# Patient Record
Sex: MALE | Race: WHITE | NOT HISPANIC OR LATINO | Employment: OTHER | URBAN - METROPOLITAN AREA
[De-identification: names, ages, dates, MRNs, and addresses within clinical notes are randomized per-mention and may not be internally consistent; named-entity substitution may affect disease eponyms.]

---

## 2017-05-26 DIAGNOSIS — Z00.00 ROUTINE GENERAL MEDICAL EXAMINATION AT A HEALTH CARE FACILITY: Primary | ICD-10-CM

## 2017-06-28 ENCOUNTER — CLINICAL SUPPORT (OUTPATIENT)
Dept: INFECTIOUS DISEASES | Facility: CLINIC | Age: 67
End: 2017-06-28
Payer: MEDICARE

## 2017-06-28 ENCOUNTER — HOSPITAL ENCOUNTER (OUTPATIENT)
Dept: CARDIOLOGY | Facility: CLINIC | Age: 67
Discharge: HOME | End: 2017-06-28
Payer: MEDICARE

## 2017-06-28 ENCOUNTER — OFFICE VISIT (OUTPATIENT)
Dept: PULMONOLOGY | Facility: CLINIC | Age: 67
End: 2017-06-28
Payer: MEDICARE

## 2017-06-28 ENCOUNTER — CLINICAL SUPPORT (OUTPATIENT)
Dept: INTERNAL MEDICINE | Facility: CLINIC | Age: 67
End: 2017-06-28
Payer: MEDICARE

## 2017-06-28 ENCOUNTER — HOSPITAL ENCOUNTER (OUTPATIENT)
Dept: RADIOLOGY | Facility: HOSPITAL | Age: 67
Discharge: HOME | End: 2017-06-28
Attending: INTERNAL MEDICINE
Payer: MEDICARE

## 2017-06-28 VITALS
WEIGHT: 198 LBS | SYSTOLIC BLOOD PRESSURE: 125 MMHG | BODY MASS INDEX: 26.82 KG/M2 | DIASTOLIC BLOOD PRESSURE: 71 MMHG | HEART RATE: 64 BPM | HEIGHT: 72 IN

## 2017-06-28 DIAGNOSIS — Z12.5 ENCOUNTER FOR SCREENING FOR MALIGNANT NEOPLASM OF PROSTATE: ICD-10-CM

## 2017-06-28 DIAGNOSIS — R73.01 IMPAIRED FASTING GLUCOSE: ICD-10-CM

## 2017-06-28 DIAGNOSIS — Z00.00 ROUTINE GENERAL MEDICAL EXAMINATION AT A HEALTH CARE FACILITY: ICD-10-CM

## 2017-06-28 DIAGNOSIS — R94.6 ABNORMAL RESULTS OF THYROID FUNCTION STUDIES: ICD-10-CM

## 2017-06-28 DIAGNOSIS — Z00.00 ROUTINE GENERAL MEDICAL EXAMINATION AT A HEALTH CARE FACILITY: Primary | ICD-10-CM

## 2017-06-28 DIAGNOSIS — Z00.00 ANNUAL PHYSICAL EXAM: Primary | ICD-10-CM

## 2017-06-28 DIAGNOSIS — R79.9 ABNORMAL FINDING OF BLOOD CHEMISTRY: ICD-10-CM

## 2017-06-28 LAB
ALBUMIN SERPL BCP-MCNC: 3.8 G/DL
ALP SERPL-CCNC: 59 U/L
ALT SERPL W/O P-5'-P-CCNC: 7 U/L
ANION GAP SERPL CALC-SCNC: 8 MMOL/L
AST SERPL-CCNC: 22 U/L
BILIRUB SERPL-MCNC: 1.2 MG/DL
BUN SERPL-MCNC: 13 MG/DL
CALCIUM SERPL-MCNC: 9.5 MG/DL
CHLORIDE SERPL-SCNC: 103 MMOL/L
CHOLEST/HDLC SERPL: 2.9 {RATIO}
CO2 SERPL-SCNC: 27 MMOL/L
COMPLEXED PSA SERPL-MCNC: 0.85 NG/ML
CREAT SERPL-MCNC: 0.9 MG/DL
ERYTHROCYTE [DISTWIDTH] IN BLOOD BY AUTOMATED COUNT: 13.5 %
EST. GFR  (AFRICAN AMERICAN): >60 ML/MIN/1.73 M^2
EST. GFR  (NON AFRICAN AMERICAN): >60 ML/MIN/1.73 M^2
ESTIMATED AVG GLUCOSE: 114 MG/DL
GLUCOSE SERPL-MCNC: 103 MG/DL
HBA1C MFR BLD HPLC: 5.6 %
HCT VFR BLD AUTO: 45.7 %
HDL/CHOLESTEROL RATIO: 35 %
HDLC SERPL-MCNC: 203 MG/DL
HDLC SERPL-MCNC: 71 MG/DL
HGB BLD-MCNC: 15.3 G/DL
LDLC SERPL CALC-MCNC: 118 MG/DL
MCH RBC QN AUTO: 30.4 PG
MCHC RBC AUTO-ENTMCNC: 33.5 %
MCV RBC AUTO: 91 FL
NONHDLC SERPL-MCNC: 132 MG/DL
PLATELET # BLD AUTO: 279 K/UL
PMV BLD AUTO: 10.2 FL
POTASSIUM SERPL-SCNC: 4.5 MMOL/L
PROT SERPL-MCNC: 7.2 G/DL
RBC # BLD AUTO: 5.04 M/UL
SODIUM SERPL-SCNC: 138 MMOL/L
T4 FREE SERPL-MCNC: 0.75 NG/DL
TRIGL SERPL-MCNC: 70 MG/DL
TSH SERPL DL<=0.005 MIU/L-ACNC: 8.47 UIU/ML
WBC # BLD AUTO: 6.77 K/UL

## 2017-06-28 PROCEDURE — 90736 HZV VACCINE LIVE SUBQ: CPT | Mod: S$GLB,,, | Performed by: INTERNAL MEDICINE

## 2017-06-28 PROCEDURE — 71020 XR CHEST PA AND LATERAL: CPT | Mod: 26,,, | Performed by: RADIOLOGY

## 2017-06-28 PROCEDURE — 99397 PER PM REEVAL EST PAT 65+ YR: CPT | Mod: S$GLB,,, | Performed by: INTERNAL MEDICINE

## 2017-06-28 PROCEDURE — 99999 PR PBB SHADOW E&M-EST. PATIENT-LVL III: CPT | Mod: PBBFAC,,, | Performed by: INTERNAL MEDICINE

## 2017-06-28 PROCEDURE — 93000 ELECTROCARDIOGRAM COMPLETE: CPT | Mod: S$GLB,,, | Performed by: INTERNAL MEDICINE

## 2017-06-28 PROCEDURE — 99999 PR PBB SHADOW E&M-EST. PATIENT-LVL I: CPT | Mod: PBBFAC,,,

## 2017-06-28 PROCEDURE — 90471 IMMUNIZATION ADMIN: CPT | Mod: S$GLB,,, | Performed by: INTERNAL MEDICINE

## 2017-06-28 PROCEDURE — 99499 UNLISTED E&M SERVICE: CPT | Mod: S$GLB,,, | Performed by: INTERNAL MEDICINE

## 2017-06-28 NOTE — LETTER
June 28, 2017    Octavio Constantino  [address] [place] [address]             [facility] - Pulmonary Services  [address]  [facility] [address]  Phone: [phone] Dear Mr. Constantino:    Thank you for allowing me to serve you and perform your Executive Health exam on 6/28/2017. This letter will serve as a brief summary of the physical findings and laboratory/studies performed and recommendations at this time. Today's assessment is essentially normal. Keep up the exercise and your weight under 200 and you are on sound ground.           If you have any questions or concerns, please don't hesitate to call.    Sincerely,        Raphael Carrington MD

## 2017-06-28 NOTE — PROGRESS NOTES
Subjective:       Patient ID: Octavio Constantino is a 67 y.o. male.    Chief Complaint: Annual Exam    HPI 66 yo semi retired  comes for his periodic health exam . He feels well, is exercising regularly and keeping his weight under 200 pounds. No bouts of asthma and uses his Advair discus once daily. No significant medical encounters since last visit in May, 2016.  Review of Systems   Constitutional: Negative.    HENT: Negative.    Eyes: Negative.    Respiratory: Negative.         Mild persistent asthma under good control with Advair.   Cardiovascular: Negative.    Gastrointestinal: Negative.    Genitourinary: Negative.    Musculoskeletal: Negative.    Skin: Negative.    Neurological: Negative.    Psychiatric/Behavioral: Negative.    All other systems reviewed and are negative.      Objective:      Physical Exam   Constitutional: He is oriented to person, place, and time. He appears well-developed and well-nourished.   HENT:   Head: Normocephalic and atraumatic.   Right Ear: External ear normal.   Left Ear: External ear normal.   Eyes: Conjunctivae and EOM are normal. Pupils are equal, round, and reactive to light.   Neck: Normal range of motion. Neck supple.   Cardiovascular: Normal rate, regular rhythm and normal heart sounds.    Pulmonary/Chest: Effort normal and breath sounds normal.   [place] flow 480 l/min   Abdominal: Soft. Bowel sounds are normal.   Musculoskeletal: Normal range of motion.   Neurological: He is alert and oriented to person, place, and time. He has normal reflexes.   Skin: Skin is warm and dry.   Psychiatric: He has a normal mood and affect. His behavior is normal. Judgment and thought content normal.       Assessment:       No diagnosis found.    Plan:         Labs; Essentially normal Cholesteril slightly elevated but offset by a high HDL, TSH slightly high but the thyroid hormone value is normal, no treatment needed, Chest -ray is clear and EKG is normal.  IMP: Healthy Male with  good health habits, asthma well controlled.  Will get shingles injection and arrange for screening colonoscope.

## 2017-12-26 RX ORDER — LISINOPRIL 10 MG/1
TABLET ORAL
Qty: 90 TABLET | Refills: 4 | Status: SHIPPED | OUTPATIENT
Start: 2017-12-26 | End: 2019-02-10 | Stop reason: SDUPTHER

## 2018-01-17 ENCOUNTER — PES CALL (OUTPATIENT)
Dept: ADMINISTRATIVE | Facility: CLINIC | Age: 68
End: 2018-01-17

## 2018-03-13 ENCOUNTER — PES CALL (OUTPATIENT)
Dept: ADMINISTRATIVE | Facility: CLINIC | Age: 68
End: 2018-03-13

## 2018-03-23 ENCOUNTER — OFFICE VISIT (OUTPATIENT)
Dept: FAMILY MEDICINE | Facility: CLINIC | Age: 68
End: 2018-03-23
Payer: MEDICARE

## 2018-03-23 VITALS
BODY MASS INDEX: 27.68 KG/M2 | WEIGHT: 204.38 LBS | DIASTOLIC BLOOD PRESSURE: 80 MMHG | OXYGEN SATURATION: 96 % | HEIGHT: 72 IN | HEART RATE: 96 BPM | SYSTOLIC BLOOD PRESSURE: 140 MMHG

## 2018-03-23 DIAGNOSIS — Z23 NEED FOR VACCINATION AGAINST STREPTOCOCCUS PNEUMONIAE: ICD-10-CM

## 2018-03-23 DIAGNOSIS — Z12.11 SPECIAL SCREENING FOR MALIGNANT NEOPLASMS, COLON: ICD-10-CM

## 2018-03-23 DIAGNOSIS — I10 ESSENTIAL HYPERTENSION: ICD-10-CM

## 2018-03-23 DIAGNOSIS — E78.00 PURE HYPERCHOLESTEROLEMIA: ICD-10-CM

## 2018-03-23 DIAGNOSIS — E66.3 OVERWEIGHT (BMI 25.0-29.9): ICD-10-CM

## 2018-03-23 DIAGNOSIS — Z00.00 ENCOUNTER FOR PREVENTIVE HEALTH EXAMINATION: Primary | ICD-10-CM

## 2018-03-23 DIAGNOSIS — M47.819 ARTHRITIS OF FACET JOINTS AT MULTIPLE VERTEBRAL LEVELS: ICD-10-CM

## 2018-03-23 DIAGNOSIS — Z12.5 ENCOUNTER FOR PROSTATE CANCER SCREENING: ICD-10-CM

## 2018-03-23 DIAGNOSIS — Z11.59 NEED FOR HEPATITIS C SCREENING TEST: ICD-10-CM

## 2018-03-23 PROBLEM — R79.89 ELEVATED TSH: Status: ACTIVE | Noted: 2018-03-23

## 2018-03-23 PROCEDURE — G0439 PPPS, SUBSEQ VISIT: HCPCS | Mod: S$GLB,,, | Performed by: NURSE PRACTITIONER

## 2018-03-23 PROCEDURE — 99499 UNLISTED E&M SERVICE: CPT | Mod: S$GLB,,, | Performed by: NURSE PRACTITIONER

## 2018-03-23 PROCEDURE — 99999 PR PBB SHADOW E&M-EST. PATIENT-LVL IV: CPT | Mod: PBBFAC,,, | Performed by: NURSE PRACTITIONER

## 2018-03-23 PROCEDURE — G0009 ADMIN PNEUMOCOCCAL VACCINE: HCPCS | Mod: S$GLB,,, | Performed by: FAMILY MEDICINE

## 2018-03-23 PROCEDURE — 90670 PCV13 VACCINE IM: CPT | Mod: S$GLB,,, | Performed by: FAMILY MEDICINE

## 2018-03-23 NOTE — Clinical Note
Primary Care Providers: Raphael Carrington MD, MD (General)  Your patient was seen today for a HRA visit. Gap(s) in care (HEDIS gaps) have been identified during this visit that require additional testing and possible follow up.  Orders Placed This Encounter     PNEUMOCOCCAL CONJUGATE VACCINE 13-VALENT LESS THAN 4YO & GREATER THAN 51YO IM     CBC auto differential         Standing Status: Future         Standing Expiration Date: 5/22/2019     Comprehensive metabolic panel         Standing Status: Future         Standing Expiration Date: 5/22/2019     Hepatitis C antibody         Standing Status: Future         Standing Expiration Date: 5/22/2019     Lipid panel         Standing Status: Future         Standing Expiration Date: 5/22/2019     TSH         Standing Status: Future         Standing Expiration Date: 5/22/2019     PSA, Screening         Standing Status: Future         Standing Expiration Date: 5/22/2019     Case request GI: Colon CA Screening         Order Specific Keke

## 2018-03-23 NOTE — PROGRESS NOTES
Octavio Constantino presented for a  Medicare AWV and comprehensive Health Risk Assessment today. The following components were reviewed and updated:    · Medical history  · Family History  · Social history  · Allergies and Current Medications  · Health Risk Assessment  · Health Maintenance  · Care Team     ** See Completed Assessments for Annual Wellness Visit within the encounter summary.**       The following assessments were completed:  · Living Situation  · CAGE  · Depression Screening  · Timed Get Up and Go  · Whisper Test  · Cognitive Function Screening  · Nutrition Screening  · ADL Screening  · PAQ Screening    Vitals:    03/23/18 1401   BP: (!) 140/80   BP Location: Right arm   Patient Position: Sitting   BP Method: Large (Manual)   Pulse: 96   SpO2: 96%   Weight: 92.7 kg (204 lb 5.9 oz)   Height: 6' (1.829 m)     Body mass index is 27.72 kg/m².     Physical Exam   Constitutional: He is oriented to person, place, and time. He appears well-developed and well-nourished. No distress.   HENT:   Head: Normocephalic and atraumatic.   Eyes: EOM are normal. Pupils are equal, round, and reactive to light.   Neck: Neck supple. No JVD present. No tracheal deviation present.   Cardiovascular: Normal rate, regular rhythm, normal heart sounds and intact distal pulses.    No murmur heard.  Pulmonary/Chest: Effort normal and breath sounds normal. No respiratory distress. He has no wheezes. He has no rales.   Abdominal: Soft. Bowel sounds are normal. He exhibits no distension and no mass. There is no tenderness.   Musculoskeletal: Normal range of motion. He exhibits no edema or tenderness.   Neurological: He is alert and oriented to person, place, and time. Coordination normal.   Skin: Skin is warm and dry. No erythema. No pallor.   Psychiatric: He has a normal mood and affect. His behavior is normal. Judgment and thought content normal. Cognition and memory are normal. He expresses no homicidal and no suicidal ideation.    Nursing note and vitals reviewed.        Diagnoses and health risks identified today and associated recommendations/orders:    1. Encounter for preventive health examination    2. Essential hypertension  Chronic; stable on medication.  Followed by PCP.  - CBC auto differential; Future  - Comprehensive metabolic panel; Future  - TSH; Future    3. Pure hypercholesterolemia  Chronic; stable with lifestyle modifications.  Followed by PCP.    4. 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  As seen on imaging dated 07/20/11.  Followed by PCP.    5. Overweight (BMI 25.0-29.9)  Chronic, stable. Therapeutic lifestyle changes discussed. Followed by PCP.  - Lipid panel; Future    6. Special screening for malignant neoplasms, colon  - Case request GI: Colon CA Screening    7. Encounter for prostate cancer screening  - PSA, Screening; Future    8. Need for hepatitis C screening test  - Hepatitis C antibody; Future    9. Need for vaccination against Streptococcus pneumoniae  Prevnar 13 vaccination administered today in clinic.  - PNEUMOCOCCAL CONJUGATE VACCINE 13-VALENT LESS THAN 4YO & GREATER THAN 49YO IM      Provided Octavio with a 5-10 year written screening schedule and personal prevention plan. Recommendations were developed using the USPSTF age appropriate recommendations. Education, counseling, and referrals were provided as needed. After Visit Summary printed and given to patient which includes a list of additional screenings\tests needed.    Follow-up in about 3 months (around 6/28/2018) for annual visit with PCP, HRA visit in 1 year.    Syeda Hodge NP

## 2018-03-23 NOTE — PATIENT INSTRUCTIONS
Counseling and Referral of Other Preventative  (Italic type indicates deductible and co-insurance are waived)    Patient Name: Octavio Constantino  Today's Date: 3/23/2018    Health Maintenance       Date Due Completion Date    Hepatitis C Screening 1950 ---    TETANUS VACCINE 04/25/1966 ---    Colonoscopy 04/25/1998 ---    Pneumococcal (65+) (1 of 2 - PCV13) 04/25/2013 ---    Abdominal Aortic Aneurysm Screening 04/25/2013 ---    Lipid Panel 06/28/2022 6/28/2017        Orders Placed This Encounter   Procedures    PNEUMOCOCCAL CONJUGATE VACCINE 13-VALENT LESS THAN 4YO & GREATER THAN 49YO IM    CBC auto differential    Comprehensive metabolic panel    Hepatitis C antibody    Lipid panel    TSH    PSA, Screening     The following information is provided to all patients.  This information is to help you find resources for any of the problems found today that may be affecting your health:                Living healthy guide: www.[facility].[place].gov      Understanding Diabetes: www.diabetes.org      Eating healthy: www.cdc.gov/healthyweight      CDC home safety checklist: www.cdc.gov/steadi/patient.html      Agency on Aging: www.goea.[place].[facility]      Alcoholics anonymous (AA): www.aa.org      Physical Activity: www.[place].nih.gov/yx7lmbv      Tobacco use: www.quitwithusla.org

## 2018-04-05 ENCOUNTER — TELEPHONE (OUTPATIENT)
Dept: GASTROENTEROLOGY | Facility: CLINIC | Age: 68
End: 2018-04-05

## 2018-04-05 ENCOUNTER — PATIENT MESSAGE (OUTPATIENT)
Dept: GASTROENTEROLOGY | Facility: CLINIC | Age: 68
End: 2018-04-05

## 2018-04-05 NOTE — TELEPHONE ENCOUNTER
Referral was sent from Dr. Constantino to schedule an Colonoscopy, left an voicemail for patient to call the office back in regards to scheduling procedure.

## 2018-04-24 ENCOUNTER — TELEPHONE (OUTPATIENT)
Dept: GASTROENTEROLOGY | Facility: CLINIC | Age: 68
End: 2018-04-24

## 2018-04-24 NOTE — TELEPHONE ENCOUNTER
----- Message from Ana Ziegler sent at 4/24/2018  4:03 PM CDT -----  Contact: Self [phone]  Patient Returning Your Phone Call

## 2018-04-25 ENCOUNTER — TELEPHONE (OUTPATIENT)
Dept: GASTROENTEROLOGY | Facility: CLINIC | Age: 68
End: 2018-04-25

## 2018-04-25 DIAGNOSIS — Z80.0 FAMILY HISTORY OF COLON CANCER: ICD-10-CM

## 2018-04-25 DIAGNOSIS — Z12.11 COLON CANCER SCREENING: Primary | ICD-10-CM

## 2018-04-25 RX ORDER — POLYETHYLENE GLYCOL 3350, SODIUM SULFATE ANHYDROUS, SODIUM BICARBONATE, SODIUM CHLORIDE, POTASSIUM CHLORIDE 236; 22.74; 6.74; 5.86; 2.97 G/4L; G/4L; G/4L; G/4L; G/4L
4 POWDER, FOR SOLUTION ORAL ONCE
Qty: 4000 ML | Refills: 0 | Status: SHIPPED | OUTPATIENT
Start: 2018-04-25 | End: 2018-04-25

## 2018-04-25 NOTE — TELEPHONE ENCOUNTER
Colonoscopy Referral    Referring Physician: Dr. Constantino       Date: 05/30/2018    Reason for Referral: Screening colon, Fm Hx of Colon Cancer     Family History of:   Colon polyp:  Relationship/Age of Onset:     Colon cancer: yes  Relationship/Age of Onset: mother     Patient with:   Hemoccults Done: no    Iron deficient: no    On Blood Thinner: no    Valvular heart disease/valve replacement: no    Anemia Present: no    On NSAID: no    Lung disease: no    Kidney disease: no    Hx of polyps: no     Hx of colon cancer: no     Previous colon evalations:  yes  When: 1998  Where: [facility] [place] [facility]   Pertinent symptoms:       Review of patient's allergies indicates:  No Known Allergies    Patient was scheduled for colonoscopy on 05/30/2018 with Dr. Carver at [facility]. Golytely  instructions were reviewed with patient.

## 2018-05-30 ENCOUNTER — ANESTHESIA (OUTPATIENT)
Dept: ENDOSCOPY | Facility: HOSPITAL | Age: 68
End: 2018-05-30
Payer: MEDICARE

## 2018-05-30 ENCOUNTER — HOSPITAL ENCOUNTER (OUTPATIENT)
Facility: HOSPITAL | Age: 68
Discharge: HOME | End: 2018-05-30
Attending: INTERNAL MEDICINE | Admitting: INTERNAL MEDICINE
Payer: MEDICARE

## 2018-05-30 ENCOUNTER — ANESTHESIA EVENT (OUTPATIENT)
Dept: ENDOSCOPY | Facility: HOSPITAL | Age: 68
End: 2018-05-30
Payer: MEDICARE

## 2018-05-30 DIAGNOSIS — Z12.12 SCREENING FOR COLORECTAL CANCER: ICD-10-CM

## 2018-05-30 DIAGNOSIS — Z80.0 FAMILY HISTORY OF COLON CANCER: Primary | ICD-10-CM

## 2018-05-30 DIAGNOSIS — Z12.11 SCREENING FOR COLORECTAL CANCER: ICD-10-CM

## 2018-05-30 PROCEDURE — 63600175 PHARM REV CODE 636 W HCPCS: Performed by: NURSE ANESTHETIST, CERTIFIED REGISTERED

## 2018-05-30 PROCEDURE — 25000003 PHARM REV CODE 250: Performed by: INTERNAL MEDICINE

## 2018-05-30 PROCEDURE — 88305 TISSUE EXAM BY PATHOLOGIST: CPT | Mod: 26,,, | Performed by: PATHOLOGY

## 2018-05-30 PROCEDURE — 27201089 HC SNARE, DISP (ANY): Performed by: INTERNAL MEDICINE

## 2018-05-30 PROCEDURE — 37000009 HC ANESTHESIA EA ADD 15 MINS: Performed by: INTERNAL MEDICINE

## 2018-05-30 PROCEDURE — 45385 COLONOSCOPY W/LESION REMOVAL: CPT | Mod: PT,,, | Performed by: INTERNAL MEDICINE

## 2018-05-30 PROCEDURE — 45385 COLONOSCOPY W/LESION REMOVAL: CPT | Performed by: INTERNAL MEDICINE

## 2018-05-30 PROCEDURE — 88305 TISSUE EXAM BY PATHOLOGIST: CPT | Performed by: PATHOLOGY

## 2018-05-30 PROCEDURE — 37000008 HC ANESTHESIA 1ST 15 MINUTES: Performed by: INTERNAL MEDICINE

## 2018-05-30 RX ORDER — PROPOFOL 10 MG/ML
VIAL (ML) INTRAVENOUS
Status: DISCONTINUED | OUTPATIENT
Start: 2018-05-30 | End: 2018-05-30

## 2018-05-30 RX ORDER — FLUTICASONE PROPIONATE AND SALMETEROL 100; 50 UG/1; UG/1
1 POWDER RESPIRATORY (INHALATION) 2 TIMES DAILY
COMMUNITY
End: 2019-03-26

## 2018-05-30 RX ORDER — PROPOFOL 10 MG/ML
VIAL (ML) INTRAVENOUS CONTINUOUS PRN
Status: DISCONTINUED | OUTPATIENT
Start: 2018-05-30 | End: 2018-05-30

## 2018-05-30 RX ORDER — LIDOCAINE HCL/PF 100 MG/5ML
SYRINGE (ML) INTRAVENOUS
Status: DISCONTINUED | OUTPATIENT
Start: 2018-05-30 | End: 2018-05-30

## 2018-05-30 RX ORDER — SODIUM CHLORIDE 9 MG/ML
INJECTION, SOLUTION INTRAVENOUS CONTINUOUS
Status: DISCONTINUED | OUTPATIENT
Start: 2018-05-31 | End: 2018-05-30 | Stop reason: HOSPADM

## 2018-05-30 RX ORDER — LIDOCAINE HYDROCHLORIDE 10 MG/ML
1 INJECTION, SOLUTION EPIDURAL; INFILTRATION; INTRACAUDAL; PERINEURAL ONCE
Status: DISCONTINUED | OUTPATIENT
Start: 2018-05-30 | End: 2018-05-30 | Stop reason: HOSPADM

## 2018-05-30 RX ADMIN — PROPOFOL 80 MG: 10 INJECTION, EMULSION INTRAVENOUS at 08:05

## 2018-05-30 RX ADMIN — PROPOFOL 150 MCG/KG/MIN: 10 INJECTION, EMULSION INTRAVENOUS at 08:05

## 2018-05-30 RX ADMIN — SODIUM CHLORIDE: 0.9 INJECTION, SOLUTION INTRAVENOUS at 07:05

## 2018-05-30 RX ADMIN — PROPOFOL 20 MG: 10 INJECTION, EMULSION INTRAVENOUS at 08:05

## 2018-05-30 RX ADMIN — LIDOCAINE HYDROCHLORIDE 100 MG: 20 INJECTION, SOLUTION INTRAVENOUS at 08:05

## 2018-05-30 NOTE — ANESTHESIA POSTPROCEDURE EVALUATION
Anesthesia Post Evaluation    Patient: Octavio Constantino    Procedure(s) Performed: Procedure(s) (LRB):  COLONOSCOPY (N/A)    Final Anesthesia Type: MAC  Patient location during evaluation: GI PACU  Patient participation: Yes- Able to Participate  Level of consciousness: awake and alert and oriented  Post-procedure vital signs: reviewed and stable  Pain management: adequate  Airway patency: patent  PONV status at discharge: No PONV  Anesthetic complications: no      Cardiovascular status: blood pressure returned to baseline, hemodynamically stable and stable  Respiratory status: unassisted, spontaneous ventilation and room air  Hydration status: euvolemic  Follow-up not needed.        Visit Vitals  BP (!) 148/74   Pulse 74   Temp 36.8 °C (98.2 °F)   Resp 20   Ht 6' (1.829 m)   Wt 88.5 kg (195 lb)   SpO2 96%   BMI 26.45 kg/m²       Pain/Blaire Score: Pain Assessment Performed: Yes (5/30/2018  7:39 AM)  Presence of Pain: denies (5/30/2018  7:39 AM)

## 2018-05-30 NOTE — ANESTHESIA PREPROCEDURE EVALUATION
05/30/2018  Octavio Constantino is a 68 y.o., male.    Anesthesia Evaluation         Review of Systems  Social:  Non-Smoker, Alcohol Use   Cardiovascular:   Hypertension    Pulmonary:   Asthma mild    Neurological:  Neurology Normal    Endocrine:  Endocrine Normal        Physical Exam  General:  Well nourished    Airway/Jaw/Neck:  Airway Findings: Mouth Opening: Normal Tongue: Normal  General Airway Assessment: Adult  Mallampati: II      Dental:  Dental Findings: In tact   Chest/Lungs:  Chest/Lungs Findings: Clear to auscultation, Normal Respiratory Rate     Heart/Vascular:  Heart Findings: Rate: Normal  Rhythm: Regular Rhythm        Mental Status:  Mental Status Findings:  Cooperative, Alert and Oriented         Anesthesia Plan  Type of Anesthesia, risks & benefits discussed:  Anesthesia Type:  MAC  Patient's Preference: MAC  Intra-op Monitoring Plan:   Intra-op Monitoring Plan Comments:   Post Op Pain Control Plan:   Post Op Pain Control Plan Comments:   Induction:   IV  Beta Blocker:         Informed Consent: Patient understands risks and agrees with Anesthesia plan.  Questions answered. Anesthesia consent signed with patient.  ASA Score: 2     Day of Surgery Review of History & Physical:            Ready For Surgery From Anesthesia Perspective.

## 2018-05-30 NOTE — TRANSFER OF CARE
Anesthesia Transfer of Care Note    Patient: Octavio Constantino    Procedure(s) Performed: Procedure(s) (LRB):  COLONOSCOPY (N/A)    Patient location: GI    Anesthesia Type: MAC    Transport from OR: Transported from OR on room air with adequate spontaneous ventilation    Post pain: adequate analgesia    Post assessment: no apparent anesthetic complications and tolerated procedure well    Post vital signs: stable    Level of consciousness: awake, alert and oriented    Nausea/Vomiting: no nausea/vomiting    Complications: none    Transfer of care protocol was followed      Last vitals:   Visit Vitals  BP (!) 148/74   Pulse 74   Temp 36.8 °C (98.2 °F)   Resp 20   Ht 6' (1.829 m)   Wt 88.5 kg (195 lb)   SpO2 96%   BMI 26.45 kg/m²

## 2018-05-30 NOTE — PROVATION PATIENT INSTRUCTIONS
Discharge Summary/Instructions after an Endoscopic Procedure  Patient Name: Octavio Constantino  Patient MRN: [id number]  Patient YOB: 1950  Wednesday, May 30, 2018  Srikanth Carver MD  RESTRICTIONS:  During your procedure today, you received medications for sedation.  These   medications may affect your judgment, balance and coordination.  Therefore,   for 24 hours, you have the following restrictions:   - DO NOT drive a car, operate machinery, make legal/financial decisions,   sign important papers or drink alcohol.    ACTIVITY:  The following day: return to full activity including work, except no heavy   lifting, straining or running for 3 days if polyps were removed.  DIET:  Eat and drink normally unless instructed otherwise.     TREATMENT FOR COMMON SIDE EFFECTS:  - Mild abdominal pain, nausea, belching, bloating or excessive gas:  rest,   eat lightly and use a heating pad.  - Sore Throat: treat with throat lozenges and/or gargle with warm salt   water.  - Because air was used during the procedure, expelling large amounts of air   from your rectum or belching is normal.  - If a bowel prep was taken, you may not have a bowel movement for 1-3 days.    This is normal.  SYMPTOMS TO WATCH FOR AND REPORT TO YOUR PHYSICIAN:  1. Abdominal pain or bloating, other than gas cramps.  2. Chest pain.  3. Back pain.  4. Signs of infection such as: chills or fever occurring within 24 hours   after the procedure.  5. Rectal bleeding, which would show as bright red, maroon, or black stools.   (A tablespoon of blood from the rectum is not serious, especially if   hemorrhoids are present.)  6. Vomiting.  7. Weakness or dizziness.  GO DIRECTLY TO THE NEAREST EMERGENCY ROOM IF YOU HAVE ANY OF THE FOLLOWING:      Difficulty breathing              Chills and/or fever over 101 F   Persistent vomiting and/or vomiting blood   Severe abdominal pain   Severe chest pain   Black, tarry stools   Bleeding- more than one  tablespoon   Any other symptom or condition that you feel may need urgent attention  Your doctor recommends these additional instructions:  If any biopsies were taken, your doctors clinic will contact you in 1 to 2   weeks with any results.  - Discharge patient to home.   - Patient has a contact number available for emergencies.  The signs and   symptoms of potential delayed complications were discussed with the   patient.  Return to normal activities tomorrow.  Written discharge   instructions were provided to the patient.   - Resume previous diet.   - Continue present medications.   - No aspirin, ibuprofen, naproxen, or other non-steroidal anti-inflammatory   drugs for 10 days after polyp removal.   - Await pathology results.   - Repeat colonoscopy in 3 years for surveillance.   - Return to primary care physician.  For questions, problems or results please call your physician - Srikanth Carver MD at Work:  [phone].  EMERGENCY PHONE NUMBER: [phone],  LAB RESULTS: [phone]  IF A COMPLICATION OR EMERGENCY SITUATION ARISES AND YOU ARE UNABLE TO REACH   YOUR PHYSICIAN - GO DIRECTLY TO THE EMERGENCY ROOM.  Srikanth Carver MD  5/30/2018 8:48:57 AM  This report has been verified and signed electronically.  PR[employer]ATION

## 2018-05-30 NOTE — PLAN OF CARE
Past Medical History:   Diagnosis Date    Asthma     Hypertension      Colon polyps found on exam today. Dr. Carver visited at bedside prior to discharge, discussed findings and recommendations with patient; all questions asked and answered. Verbalized understanding of information give. Handout provided at time of discharge.

## 2018-05-30 NOTE — DISCHARGE INSTRUCTIONS

## 2018-05-30 NOTE — H&P
History and Physical      Chief complaints: Requesting screening colonscopy    History of Presenting Illness    Patient requesting colonoscopy.  Patient denies any abdominal pain, weight loss or blood in the stool.  There is a family history of colon cancer.       Past Medical History:   Diagnosis Date    Asthma     Hypertension        Past Surgical History:   Procedure Laterality Date    ADENOIDECTOMY      EYE SURGERY      MOUTH SURGERY  02/2018    REFRACTIVE SURGERY Right     TONSILLECTOMY         Family History   Problem Relation Age of Onset    Alzheimer's disease Mother     Cancer Mother         Colon    Colon cancer Mother     Stroke Father     No Known Problems Son        Social History     Social History    Marital status:      Spouse name: N/A    Number of children: N/A    Years of education: N/A     Social History Main Topics    Smoking status: Former Smoker     Packs/day: 1.00     Years: 25.00     Types: Cigarettes     Quit date: 2/1/1990    Smokeless tobacco: Former User    Alcohol use 10.2 - 14.4 oz/week     3 Standard drinks or equivalent, 14 - 21 Cans of beer per week    Drug use: No    Sexual activity: Not Currently     Partners: Female     Other Topics Concern    Not on file     Social History Narrative    No narrative on file       No current facility-administered medications for this encounter.      Current Outpatient Prescriptions   Medication Sig Dispense Refill    indomethacin (INDOCIN) 50 MG capsule 1capsule with food tid prn for gout 60 capsule 1    lisinopril 10 MG tablet TAKE 1 TABLET EVERY MORNING 90 tablet 4    LUTEIN/ZEAXANTHIN (OCUVITE LUTEIN 25 ORAL) Take 1 tablet by mouth once daily.         Review of patient's allergies indicates:  No Known Allergies    Objective:    There were no vitals filed for this visit.  Physical Exam   Constitutional: Patient is oriented to person, place, and time. Appears well-nourished.   HENT: sclera non  icteric  Mouth/Throat: Oropharynx is clear and moist.   Eyes: Pupils are equal, round, and reactive to light.   Neck: Neck supple.   Cardiovascular: Normal heart sounds.   Pulmonary/Chest: Effort normal and breath sounds normal.   Abdominal: Soft. Exhibits no mass. There is no tenderness. There is no guarding.    Neurological:Alert and oriented to person, place, and time.   Skin: Skin is warm. No rash noted.   Psychiatric: Has a normal mood and affect.     Assessment:  Colon cancer screening  Pts mother had colon cancer    Plan:  Colonoscopy       I have reviewed the patient's medical history in detail and updated the computerized patient record

## 2018-05-31 VITALS
WEIGHT: 195 LBS | BODY MASS INDEX: 26.41 KG/M2 | HEART RATE: 60 BPM | RESPIRATION RATE: 20 BRPM | HEIGHT: 72 IN | SYSTOLIC BLOOD PRESSURE: 120 MMHG | TEMPERATURE: 98 F | DIASTOLIC BLOOD PRESSURE: 81 MMHG | OXYGEN SATURATION: 100 %

## 2018-05-31 DIAGNOSIS — Z00.00 ROUTINE GENERAL MEDICAL EXAMINATION AT A HEALTH CARE FACILITY: Primary | ICD-10-CM

## 2018-06-14 ENCOUNTER — TELEPHONE (OUTPATIENT)
Dept: GASTROENTEROLOGY | Facility: CLINIC | Age: 68
End: 2018-06-14

## 2018-06-14 NOTE — TELEPHONE ENCOUNTER
----- Message from Lauren Awan sent at 6/14/2018 12:52 PM CDT -----  Contact: self / [phone]  Patient is requesting a call back regarding, needs results of his colonoscopy. Please advise

## 2018-06-19 ENCOUNTER — PATIENT MESSAGE (OUTPATIENT)
Dept: CARDIOLOGY | Facility: CLINIC | Age: 68
End: 2018-06-19

## 2018-07-10 ENCOUNTER — HOSPITAL ENCOUNTER (OUTPATIENT)
Dept: CARDIOLOGY | Facility: CLINIC | Age: 68
Discharge: HOME | End: 2018-07-10
Payer: MEDICARE

## 2018-07-10 ENCOUNTER — OFFICE VISIT (OUTPATIENT)
Dept: PULMONOLOGY | Facility: CLINIC | Age: 68
End: 2018-07-10
Payer: MEDICARE

## 2018-07-10 ENCOUNTER — CLINICAL SUPPORT (OUTPATIENT)
Dept: INTERNAL MEDICINE | Facility: CLINIC | Age: 68
End: 2018-07-10
Payer: MEDICARE

## 2018-07-10 ENCOUNTER — HOSPITAL ENCOUNTER (OUTPATIENT)
Dept: RADIOLOGY | Facility: HOSPITAL | Age: 68
Discharge: HOME | End: 2018-07-10
Attending: INTERNAL MEDICINE
Payer: MEDICARE

## 2018-07-10 VITALS
HEART RATE: 64 BPM | BODY MASS INDEX: 27.5 KG/M2 | WEIGHT: 203 LBS | SYSTOLIC BLOOD PRESSURE: 135 MMHG | DIASTOLIC BLOOD PRESSURE: 82 MMHG | HEIGHT: 72 IN

## 2018-07-10 DIAGNOSIS — Z12.5 SPECIAL SCREENING FOR MALIGNANT NEOPLASM OF PROSTATE: ICD-10-CM

## 2018-07-10 DIAGNOSIS — E11.9 DIABETES MELLITUS WITHOUT COMPLICATION: ICD-10-CM

## 2018-07-10 DIAGNOSIS — Z00.00 ROUTINE GENERAL MEDICAL EXAMINATION AT A HEALTH CARE FACILITY: ICD-10-CM

## 2018-07-10 DIAGNOSIS — D64.9 ANEMIA, UNSPECIFIED TYPE: ICD-10-CM

## 2018-07-10 DIAGNOSIS — E03.9 PRIMARY HYPOTHYROIDISM: ICD-10-CM

## 2018-07-10 DIAGNOSIS — J45.20 ASTHMA, MILD INTERMITTENT, WELL-CONTROLLED: ICD-10-CM

## 2018-07-10 DIAGNOSIS — Z72.89 OTHER PROBLEMS RELATED TO LIFESTYLE: ICD-10-CM

## 2018-07-10 DIAGNOSIS — E78.5 HYPERLIPIDEMIA, UNSPECIFIED HYPERLIPIDEMIA TYPE: Primary | ICD-10-CM

## 2018-07-10 DIAGNOSIS — Z00.00 ANNUAL PHYSICAL EXAM: Primary | ICD-10-CM

## 2018-07-10 LAB
ALBUMIN SERPL BCP-MCNC: 3.8 G/DL
ALP SERPL-CCNC: 53 U/L
ALT SERPL W/O P-5'-P-CCNC: 6 U/L
ANION GAP SERPL CALC-SCNC: 7 MMOL/L
AST SERPL-CCNC: 22 U/L
BILIRUB SERPL-MCNC: 1 MG/DL
BUN SERPL-MCNC: 14 MG/DL
CALCIUM SERPL-MCNC: 9.1 MG/DL
CHLORIDE SERPL-SCNC: 104 MMOL/L
CHOLEST SERPL-MCNC: 202 MG/DL
CHOLEST/HDLC SERPL: 2.8 {RATIO}
CO2 SERPL-SCNC: 26 MMOL/L
COMPLEXED PSA SERPL-MCNC: 1.2 NG/ML
CREAT SERPL-MCNC: 0.8 MG/DL
ERYTHROCYTE [DISTWIDTH] IN BLOOD BY AUTOMATED COUNT: 13.2 %
EST. GFR  (AFRICAN AMERICAN): >60 ML/MIN/1.73 M^2
EST. GFR  (NON AFRICAN AMERICAN): >60 ML/MIN/1.73 M^2
ESTIMATED AVG GLUCOSE: 111 MG/DL
GLUCOSE SERPL-MCNC: 98 MG/DL
HBA1C MFR BLD HPLC: 5.5 %
HCT VFR BLD AUTO: 43.8 %
HCV AB SERPL QL IA: NEGATIVE
HDLC SERPL-MCNC: 71 MG/DL
HDLC SERPL: 35.1 %
HGB BLD-MCNC: 14.7 G/DL
LDLC SERPL CALC-MCNC: 113.4 MG/DL
MCH RBC QN AUTO: 30.7 PG
MCHC RBC AUTO-ENTMCNC: 33.6 G/DL
MCV RBC AUTO: 91 FL
NONHDLC SERPL-MCNC: 131 MG/DL
PLATELET # BLD AUTO: 259 K/UL
PMV BLD AUTO: 10.4 FL
POTASSIUM SERPL-SCNC: 4.1 MMOL/L
PROT SERPL-MCNC: 6.7 G/DL
RBC # BLD AUTO: 4.79 M/UL
SODIUM SERPL-SCNC: 137 MMOL/L
T4 FREE SERPL-MCNC: 0.7 NG/DL
TRIGL SERPL-MCNC: 88 MG/DL
TSH SERPL DL<=0.005 MIU/L-ACNC: 6.98 UIU/ML
WBC # BLD AUTO: 6.22 K/UL

## 2018-07-10 PROCEDURE — 99397 PER PM REEVAL EST PAT 65+ YR: CPT | Mod: S$GLB,,, | Performed by: INTERNAL MEDICINE

## 2018-07-10 PROCEDURE — 80053 COMPREHEN METABOLIC PANEL: CPT

## 2018-07-10 PROCEDURE — 93000 ELECTROCARDIOGRAM COMPLETE: CPT | Mod: S$GLB,,, | Performed by: INTERNAL MEDICINE

## 2018-07-10 PROCEDURE — 83036 HEMOGLOBIN GLYCOSYLATED A1C: CPT

## 2018-07-10 PROCEDURE — 84153 ASSAY OF PSA TOTAL: CPT

## 2018-07-10 PROCEDURE — 99999 PR PBB SHADOW E&M-EST. PATIENT-LVL III: CPT | Mod: PBBFAC,,, | Performed by: INTERNAL MEDICINE

## 2018-07-10 PROCEDURE — 71046 X-RAY EXAM CHEST 2 VIEWS: CPT | Mod: TC,FY

## 2018-07-10 PROCEDURE — 84443 ASSAY THYROID STIM HORMONE: CPT

## 2018-07-10 PROCEDURE — 3075F SYST BP GE 130 - 139MM HG: CPT | Mod: CPTII,S$GLB,, | Performed by: INTERNAL MEDICINE

## 2018-07-10 PROCEDURE — 85027 COMPLETE CBC AUTOMATED: CPT

## 2018-07-10 PROCEDURE — 71046 X-RAY EXAM CHEST 2 VIEWS: CPT | Mod: 26,,, | Performed by: RADIOLOGY

## 2018-07-10 PROCEDURE — 86803 HEPATITIS C AB TEST: CPT

## 2018-07-10 PROCEDURE — 3079F DIAST BP 80-89 MM HG: CPT | Mod: CPTII,S$GLB,, | Performed by: INTERNAL MEDICINE

## 2018-07-10 PROCEDURE — 84439 ASSAY OF FREE THYROXINE: CPT

## 2018-07-10 PROCEDURE — 80061 LIPID PANEL: CPT

## 2018-07-10 RX ORDER — FLUTICASONE PROPIONATE AND SALMETEROL 250; 50 UG/1; UG/1
1 POWDER RESPIRATORY (INHALATION) 2 TIMES DAILY
Qty: 1 EACH | Refills: 12 | Status: SHIPPED | OUTPATIENT
Start: 2018-07-10 | End: 2018-09-13 | Stop reason: SDUPTHER

## 2018-07-10 NOTE — PROGRESS NOTES
Subjective:       Patient ID: Octavio Constantino is a 68 y.o. male.    Chief Complaint: Annual Exam    HPI  67 yo semi retired  who is now working three days a week comes for his periodic health exam. He feels well, seldom takes his Advair and has had no asthma exacerbations.Exercises regularly but not strenuously. Had a colonoscope recently and had two small benign polyps. (May 30th)  Review of Systems   Constitutional: Negative.    HENT: Negative.    Eyes: Negative.    Respiratory: Negative.         Mild persistent asthma which has been quiet this past year.   Cardiovascular: Negative.    Gastrointestinal: Negative.         Recent screening colonoscope   Genitourinary: Negative.    Musculoskeletal: Negative.    Skin: Negative.    Neurological: Negative.    Psychiatric/Behavioral: Negative.    All other systems reviewed and are negative.      Objective:      Physical Exam   Constitutional: He is oriented to person, place, and time. He appears well-developed and well-nourished.   HENT:   Head: Normocephalic and atraumatic.   Right Ear: External ear normal.   Left Ear: External ear normal.   Eyes: Conjunctivae and EOM are normal. Pupils are equal, round, and reactive to light.   Neck: Normal range of motion. Neck supple.   Cardiovascular: Normal rate, regular rhythm and normal heart sounds.    Pulmonary/Chest: Effort normal and breath sounds normal.   Peak flow 400 l/min  Clear without wheezes or rales.   Abdominal: Soft. Bowel sounds are normal.   Musculoskeletal: Normal range of motion.   Neurological: He is alert and oriented to person, place, and time. He has normal reflexes.   Skin: Skin is warm and dry.   Psychiatric: He has a normal mood and affect. His behavior is normal. Judgment and thought content normal.       Assessment:       No diagnosis found.    Plan:           Labs: TSH slightly elevated but T-4 normal, all other parameters are normal  Chest x-ray is clear . Since the peak flow is below  normal have suggested using his Advair twice daily even though he is asymptomatic. IMP: Mild persistent asthma.

## 2018-07-10 NOTE — LETTER
July 10, 2018    Octavio Constantino  [address] [place] [address]             [facility] - Pulmonary Services  [address]  [facility] [address]  Phone: [phone] Dear Mr. Constantino:    Thank you for allowing me to serve you and perform your Executive Health exam on 7/10/2018. This letter will serve as a brief summary of the physical findings and laboratory/studies performed and recommendations at this time. Except for a slightly low peak flow today, this is a normal exam.  The thyroid test is slightly low but does not require medication.  I called in f a new Advair discus with a stronger dose.         If you have any questions or concerns, please don't hesitate to call.    Sincerely,        Raphael Carrington MD

## 2018-09-13 DIAGNOSIS — J45.20 ASTHMA, MILD INTERMITTENT, WELL-CONTROLLED: ICD-10-CM

## 2018-09-13 NOTE — TELEPHONE ENCOUNTER
----- Message from Jennifer Martinez sent at 9/13/2018  9:55 AM CDT -----  Contact: Self  .Rx Refill/Request     Is this a Refill or New Rx:  Refill-  Rx Name and Strength:   fluticasone-salmeterol 250-50 mcg/dose (ADVAIR DISKUS) 250-50 mcg/dose diskus inhaler  Preferred Pharmacy with phone number:  [facility], [phone] Fax: [phone]  Communication Preference: [phone]  Additional Information:  Pt is trying to transfer his refill to [facility]             - [place], [place] - [address]  [address] [address]  Phone:

## 2018-09-14 RX ORDER — FLUTICASONE PROPIONATE AND SALMETEROL 250; 50 UG/1; UG/1
1 POWDER RESPIRATORY (INHALATION) 2 TIMES DAILY
Qty: 3 EACH | Refills: 3 | Status: SHIPPED | OUTPATIENT
Start: 2018-09-14 | End: 2019-09-14

## 2019-01-28 ENCOUNTER — PES CALL (OUTPATIENT)
Dept: ADMINISTRATIVE | Facility: CLINIC | Age: 69
End: 2019-01-28

## 2019-02-10 RX ORDER — LISINOPRIL 10 MG/1
TABLET ORAL
Qty: 90 TABLET | Refills: 4 | Status: SHIPPED | OUTPATIENT
Start: 2019-02-10 | End: 2020-05-25

## 2019-03-26 ENCOUNTER — OFFICE VISIT (OUTPATIENT)
Dept: FAMILY MEDICINE | Facility: CLINIC | Age: 69
End: 2019-03-26
Payer: MEDICARE

## 2019-03-26 VITALS
OXYGEN SATURATION: 97 % | SYSTOLIC BLOOD PRESSURE: 124 MMHG | BODY MASS INDEX: 28.48 KG/M2 | WEIGHT: 210.31 LBS | DIASTOLIC BLOOD PRESSURE: 80 MMHG | HEART RATE: 70 BPM | HEIGHT: 72 IN

## 2019-03-26 DIAGNOSIS — E78.00 PURE HYPERCHOLESTEROLEMIA: ICD-10-CM

## 2019-03-26 DIAGNOSIS — Z13.6 SCREENING FOR AAA (ABDOMINAL AORTIC ANEURYSM): ICD-10-CM

## 2019-03-26 DIAGNOSIS — E66.3 OVERWEIGHT (BMI 25.0-29.9): ICD-10-CM

## 2019-03-26 DIAGNOSIS — Z00.00 ENCOUNTER FOR PREVENTIVE HEALTH EXAMINATION: Primary | ICD-10-CM

## 2019-03-26 DIAGNOSIS — Z23 IMMUNIZATION DUE: ICD-10-CM

## 2019-03-26 DIAGNOSIS — I10 ESSENTIAL HYPERTENSION: ICD-10-CM

## 2019-03-26 DIAGNOSIS — M47.819 ARTHRITIS OF FACET JOINTS AT MULTIPLE VERTEBRAL LEVELS: ICD-10-CM

## 2019-03-26 PROCEDURE — 3074F PR MOST RECENT SYSTOLIC BLOOD PRESSURE < 130 MM HG: ICD-10-PCS | Mod: HCNC,CPTII,S$GLB, | Performed by: NURSE PRACTITIONER

## 2019-03-26 PROCEDURE — G0439 PPPS, SUBSEQ VISIT: HCPCS | Mod: HCNC,S$GLB,, | Performed by: NURSE PRACTITIONER

## 2019-03-26 PROCEDURE — 99999 PR PBB SHADOW E&M-EST. PATIENT-LVL V: ICD-10-PCS | Mod: PBBFAC,HCNC,, | Performed by: NURSE PRACTITIONER

## 2019-03-26 PROCEDURE — 90732 PPSV23 VACC 2 YRS+ SUBQ/IM: CPT | Mod: HCNC,S$GLB,, | Performed by: NURSE PRACTITIONER

## 2019-03-26 PROCEDURE — 3079F DIAST BP 80-89 MM HG: CPT | Mod: HCNC,CPTII,S$GLB, | Performed by: NURSE PRACTITIONER

## 2019-03-26 PROCEDURE — G0009 PNEUMOCOCCAL POLYSACCHARIDE VACCINE 23-VALENT =>2YO SQ IM: ICD-10-PCS | Mod: HCNC,S$GLB,, | Performed by: NURSE PRACTITIONER

## 2019-03-26 PROCEDURE — 90732 PNEUMOCOCCAL POLYSACCHARIDE VACCINE 23-VALENT =>2YO SQ IM: ICD-10-PCS | Mod: HCNC,S$GLB,, | Performed by: NURSE PRACTITIONER

## 2019-03-26 PROCEDURE — 99999 PR PBB SHADOW E&M-EST. PATIENT-LVL V: CPT | Mod: PBBFAC,HCNC,, | Performed by: NURSE PRACTITIONER

## 2019-03-26 PROCEDURE — G0439 PR MEDICARE ANNUAL WELLNESS SUBSEQUENT VISIT: ICD-10-PCS | Mod: HCNC,S$GLB,, | Performed by: NURSE PRACTITIONER

## 2019-03-26 PROCEDURE — G0009 ADMIN PNEUMOCOCCAL VACCINE: HCPCS | Mod: HCNC,S$GLB,, | Performed by: NURSE PRACTITIONER

## 2019-03-26 PROCEDURE — 3074F SYST BP LT 130 MM HG: CPT | Mod: HCNC,CPTII,S$GLB, | Performed by: NURSE PRACTITIONER

## 2019-03-26 PROCEDURE — 3079F PR MOST RECENT DIASTOLIC BLOOD PRESSURE 80-89 MM HG: ICD-10-PCS | Mod: HCNC,CPTII,S$GLB, | Performed by: NURSE PRACTITIONER

## 2019-03-26 NOTE — PROGRESS NOTES
Octavio Constantino presented for a  Medicare AWV and comprehensive Health Risk Assessment today. The following components were reviewed and updated:    · Medical history  · Family History  · Social history  · Allergies and Current Medications  · Health Risk Assessment  · Health Maintenance  · Care Team     ** See Completed Assessments for Annual Wellness Visit within the encounter summary.**       The following assessments were completed:  · Living Situation  · CAGE  · Depression Screening  · Timed Get Up and Go  · Whisper Test  · Cognitive Function Screening  · Nutrition Screening  · ADL Screening  · PAQ Screening    Vitals:    03/26/19 0754   BP: 124/80   BP Location: Right arm   Patient Position: Sitting   BP Method: Medium (Manual)   Pulse: 70   SpO2: 97%   Weight: 95.4 kg (210 lb 5.1 oz)   Height: 6' (1.829 m)     Body mass index is 28.52 kg/m².     Physical Exam   Constitutional: He is oriented to person, place, and time. He appears well-developed and well-nourished. No distress.   HENT:   Head: Normocephalic and atraumatic.   Eyes: Pupils are equal, round, and reactive to light. EOM are normal.   Neck: Neck supple. No JVD present. No tracheal deviation present.   Cardiovascular: Normal rate, regular rhythm, normal heart sounds and intact distal pulses.   No murmur heard.  Pulmonary/Chest: Effort normal and breath sounds normal. No respiratory distress. He has no wheezes. He has no rales.   Abdominal: Soft. Bowel sounds are normal. He exhibits no distension and no mass. There is no tenderness.   Musculoskeletal: Normal range of motion. He exhibits no edema or tenderness.   Neurological: He is alert and oriented to person, place, and time. Coordination normal.   Skin: Skin is warm and dry. No erythema. No pallor.   Psychiatric: He has a normal mood and affect. His behavior is normal. Judgment and thought content normal. Cognition and memory are normal. He expresses no homicidal and no suicidal ideation.   Nursing  note and vitals reviewed.        Diagnoses and health risks identified today and associated recommendations/orders:    1. Encounter for preventive health examination    2. Essential hypertension  Chronic; stable on medication.  Followed by PCP.    3. Pure hypercholesterolemia  Chronic; stable.  Followed by PCP.    4. 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  Followed by PCP.    5. Overweight (BMI 25.0-29.9)  Chronic, stable. Therapeutic lifestyle changes discussed. Followed by PCP.    6. Immunization due  Pneumovax vaccination administered today in clinic.  - Pneumococcal Polysaccharide Vaccine (23 Valent) (SQ/IM)    7. Screening for AAA (abdominal aortic aneurysm)  - US Abdominal Aorta; Future      Provided Octavio with a 5-10 year written screening schedule and personal prevention plan. Recommendations were developed using the USPSTF age appropriate recommendations. Education, counseling, and referrals were provided as needed. After Visit Summary printed and given to patient which includes a list of additional screenings\tests needed.    Follow up in about 3 months (around 7/10/2019) for annual visit with PCP, Annual Wellness Visit in 1 year.    Syeda Hodge NP         I offered to discuss end of life issues, including information on how to make advance directives that the patient could use to name someone who would make medical decisions on their behalf if they became too ill to make themselves.    ___Patient declined  _X_Patient is interested, I provided paper work and offered to discuss.

## 2019-03-26 NOTE — PATIENT INSTRUCTIONS
Counseling and Referral of Other Preventative  (Italic type indicates deductible and co-insurance are waived)    Patient Name: Octavio Constantino  Today's Date: 3/26/2019    Health Maintenance       Date Due Completion Date    TETANUS VACCINE 04/25/1966 ---    Abdominal Aortic Aneurysm Screening 04/25/2013 ---    Pneumococcal Vaccine (65+ Low/Medium Risk) (2 of 2 - PPSV23) 03/23/2019 3/23/2018    Lipid Panel 07/10/2023 7/10/2018    Colonoscopy 05/30/2028 5/30/2018        Orders Placed This Encounter   Procedures    US Abdominal Aorta    Pneumococcal Polysaccharide Vaccine (23 Valent) (SQ/IM)     The following information is provided to all patients.  This information is to help you find resources for any of the problems found today that may be affecting your health:                Living healthy guide: www.[facility].[place].gov      Understanding Diabetes: www.diabetes.org      Eating healthy: www.cdc.gov/healthyweight      CDC home safety checklist: www.cdc.gov/steadi/patient.html      Agency on Aging: www.goea.[place].[facility]      Alcoholics anonymous (AA): www.aa.org      Physical Activity: www.[place].nih.gov/lt4isvu      Tobacco use: www.quitwithusla.org

## 2019-04-02 ENCOUNTER — HOSPITAL ENCOUNTER (OUTPATIENT)
Dept: RADIOLOGY | Facility: HOSPITAL | Age: 69
Discharge: HOME | End: 2019-04-02
Attending: NURSE PRACTITIONER
Payer: MEDICARE

## 2019-04-02 DIAGNOSIS — Z13.6 SCREENING FOR AAA (ABDOMINAL AORTIC ANEURYSM): ICD-10-CM

## 2019-04-02 PROCEDURE — 76775 US EXAM ABDO BACK WALL LIM: CPT | Mod: 26,HCNC,, | Performed by: RADIOLOGY

## 2019-04-02 PROCEDURE — 76775 US ABDOMINAL AORTA: ICD-10-PCS | Mod: 26,HCNC,, | Performed by: RADIOLOGY

## 2019-04-02 PROCEDURE — 76775 US EXAM ABDO BACK WALL LIM: CPT | Mod: TC,HCNC

## 2019-07-27 DIAGNOSIS — J45.20 ASTHMA, MILD INTERMITTENT, WELL-CONTROLLED: ICD-10-CM

## 2019-07-27 RX ORDER — FLUTICASONE PROPIONATE AND SALMETEROL 250; 50 UG/1; UG/1
POWDER RESPIRATORY (INHALATION)
Qty: 1 EACH | Refills: 12 | Status: SHIPPED | OUTPATIENT
Start: 2019-07-27 | End: 2020-11-10

## 2019-08-06 DIAGNOSIS — R06.00 DYSPNEA, UNSPECIFIED TYPE: Primary | ICD-10-CM

## 2019-08-12 ENCOUNTER — CLINICAL SUPPORT (OUTPATIENT)
Dept: INTERNAL MEDICINE | Facility: CLINIC | Age: 69
End: 2019-08-12
Payer: MEDICARE

## 2019-08-12 ENCOUNTER — OFFICE VISIT (OUTPATIENT)
Dept: PULMONOLOGY | Facility: CLINIC | Age: 69
End: 2019-08-12
Payer: MEDICARE

## 2019-08-12 ENCOUNTER — HOSPITAL ENCOUNTER (OUTPATIENT)
Dept: RADIOLOGY | Facility: HOSPITAL | Age: 69
Discharge: HOME | End: 2019-08-12
Attending: INTERNAL MEDICINE
Payer: MEDICARE

## 2019-08-12 ENCOUNTER — HOSPITAL ENCOUNTER (OUTPATIENT)
Dept: CARDIOLOGY | Facility: CLINIC | Age: 69
Discharge: HOME | End: 2019-08-12
Payer: MEDICARE

## 2019-08-12 VITALS
SYSTOLIC BLOOD PRESSURE: 132 MMHG | WEIGHT: 202 LBS | HEIGHT: 72 IN | HEART RATE: 65 BPM | DIASTOLIC BLOOD PRESSURE: 71 MMHG | BODY MASS INDEX: 27.36 KG/M2

## 2019-08-12 DIAGNOSIS — R79.9 ABNORMAL FINDING OF BLOOD CHEMISTRY: ICD-10-CM

## 2019-08-12 DIAGNOSIS — Z11.4 ENCOUNTER FOR SCREENING FOR HIV: ICD-10-CM

## 2019-08-12 DIAGNOSIS — Z00.00 ANNUAL PHYSICAL EXAM: Primary | ICD-10-CM

## 2019-08-12 DIAGNOSIS — Z12.5 ENCOUNTER FOR SCREENING FOR MALIGNANT NEOPLASM OF PROSTATE: ICD-10-CM

## 2019-08-12 DIAGNOSIS — R78.71 ABNORMAL LEAD LEVEL IN BLOOD: ICD-10-CM

## 2019-08-12 DIAGNOSIS — R06.00 DYSPNEA, UNSPECIFIED TYPE: ICD-10-CM

## 2019-08-12 DIAGNOSIS — R73.03 PREDIABETES: ICD-10-CM

## 2019-08-12 LAB
ALBUMIN SERPL BCP-MCNC: 3.9 G/DL (ref 3.5–5.2)
ALP SERPL-CCNC: 55 U/L (ref 55–135)
ALT SERPL W/O P-5'-P-CCNC: 6 U/L (ref 10–44)
ANION GAP SERPL CALC-SCNC: 8 MMOL/L (ref 8–16)
AST SERPL-CCNC: 22 U/L (ref 10–40)
BILIRUB SERPL-MCNC: 1.1 MG/DL (ref 0.1–1)
BUN SERPL-MCNC: 14 MG/DL (ref 8–23)
CALCIUM SERPL-MCNC: 10 MG/DL (ref 8.7–10.5)
CHLORIDE SERPL-SCNC: 101 MMOL/L (ref 95–110)
CHOLEST SERPL-MCNC: 200 MG/DL (ref 120–199)
CHOLEST/HDLC SERPL: 2.7 {RATIO} (ref 2–5)
CO2 SERPL-SCNC: 30 MMOL/L (ref 23–29)
COMPLEXED PSA SERPL-MCNC: 1.7 NG/ML (ref 0–4)
CREAT SERPL-MCNC: 0.9 MG/DL (ref 0.5–1.4)
ERYTHROCYTE [DISTWIDTH] IN BLOOD BY AUTOMATED COUNT: 13.7 % (ref 11.5–14.5)
EST. GFR  (AFRICAN AMERICAN): >60 ML/MIN/1.73 M^2
EST. GFR  (NON AFRICAN AMERICAN): >60 ML/MIN/1.73 M^2
ESTIMATED AVG GLUCOSE: 114 MG/DL (ref 68–131)
GLUCOSE SERPL-MCNC: 95 MG/DL (ref 70–110)
HBA1C MFR BLD HPLC: 5.6 % (ref 4–5.6)
HCT VFR BLD AUTO: 47.2 % (ref 40–54)
HDLC SERPL-MCNC: 75 MG/DL (ref 40–75)
HDLC SERPL: 37.5 % (ref 20–50)
HGB BLD-MCNC: 15.2 G/DL (ref 14–18)
HIV 1+2 AB+HIV1 P24 AG SERPL QL IA: NEGATIVE
LDLC SERPL CALC-MCNC: 105.2 MG/DL (ref 63–159)
MCH RBC QN AUTO: 29.8 PG (ref 27–31)
MCHC RBC AUTO-ENTMCNC: 32.2 G/DL (ref 32–36)
MCV RBC AUTO: 93 FL (ref 82–98)
NONHDLC SERPL-MCNC: 125 MG/DL
PLATELET # BLD AUTO: 283 K/UL (ref 150–350)
PMV BLD AUTO: 10 FL (ref 9.2–12.9)
POTASSIUM SERPL-SCNC: 5 MMOL/L (ref 3.5–5.1)
PROT SERPL-MCNC: 7 G/DL (ref 6–8.4)
RBC # BLD AUTO: 5.1 M/UL (ref 4.6–6.2)
SODIUM SERPL-SCNC: 139 MMOL/L (ref 136–145)
T4 FREE SERPL-MCNC: 0.76 NG/DL (ref 0.71–1.51)
TRIGL SERPL-MCNC: 99 MG/DL (ref 30–150)
TSH SERPL DL<=0.005 MIU/L-ACNC: 6.57 UIU/ML (ref 0.4–4)
WBC # BLD AUTO: 6.31 K/UL (ref 3.9–12.7)

## 2019-08-12 PROCEDURE — 86703 HIV-1/HIV-2 1 RESULT ANTBDY: CPT | Mod: HCNC

## 2019-08-12 PROCEDURE — 93005 EKG 12-LEAD: ICD-10-PCS | Mod: HCNC,S$GLB,, | Performed by: INTERNAL MEDICINE

## 2019-08-12 PROCEDURE — 71046 X-RAY EXAM CHEST 2 VIEWS: CPT | Mod: 26,HCNC,, | Performed by: RADIOLOGY

## 2019-08-12 PROCEDURE — 83036 HEMOGLOBIN GLYCOSYLATED A1C: CPT | Mod: HCNC

## 2019-08-12 PROCEDURE — 84443 ASSAY THYROID STIM HORMONE: CPT | Mod: HCNC

## 2019-08-12 PROCEDURE — 80053 COMPREHEN METABOLIC PANEL: CPT | Mod: HCNC

## 2019-08-12 PROCEDURE — 3078F PR MOST RECENT DIASTOLIC BLOOD PRESSURE < 80 MM HG: ICD-10-PCS | Mod: HCNC,CPTII,S$GLB, | Performed by: INTERNAL MEDICINE

## 2019-08-12 PROCEDURE — 3075F SYST BP GE 130 - 139MM HG: CPT | Mod: HCNC,CPTII,S$GLB, | Performed by: INTERNAL MEDICINE

## 2019-08-12 PROCEDURE — 71046 XR CHEST PA AND LATERAL: ICD-10-PCS | Mod: 26,HCNC,, | Performed by: RADIOLOGY

## 2019-08-12 PROCEDURE — 84153 ASSAY OF PSA TOTAL: CPT | Mod: HCNC

## 2019-08-12 PROCEDURE — 99999 PR PBB SHADOW E&M-EST. PATIENT-LVL III: ICD-10-PCS | Mod: PBBFAC,HCNC,, | Performed by: INTERNAL MEDICINE

## 2019-08-12 PROCEDURE — 80061 LIPID PANEL: CPT | Mod: HCNC

## 2019-08-12 PROCEDURE — 85027 COMPLETE CBC AUTOMATED: CPT | Mod: HCNC

## 2019-08-12 PROCEDURE — 93005 ELECTROCARDIOGRAM TRACING: CPT | Mod: HCNC,S$GLB,, | Performed by: INTERNAL MEDICINE

## 2019-08-12 PROCEDURE — 84439 ASSAY OF FREE THYROXINE: CPT | Mod: HCNC

## 2019-08-12 PROCEDURE — 99387 INIT PM E/M NEW PAT 65+ YRS: CPT | Mod: HCNC,S$GLB,, | Performed by: INTERNAL MEDICINE

## 2019-08-12 PROCEDURE — 71046 X-RAY EXAM CHEST 2 VIEWS: CPT | Mod: TC,HCNC,FY

## 2019-08-12 PROCEDURE — 93010 ELECTROCARDIOGRAM REPORT: CPT | Mod: HCNC,S$GLB,, | Performed by: INTERNAL MEDICINE

## 2019-08-12 PROCEDURE — 99387 PR PREVENTIVE VISIT,NEW,65 & OVER: ICD-10-PCS | Mod: HCNC,S$GLB,, | Performed by: INTERNAL MEDICINE

## 2019-08-12 PROCEDURE — 99999 PR PBB SHADOW E&M-EST. PATIENT-LVL III: CPT | Mod: PBBFAC,HCNC,, | Performed by: INTERNAL MEDICINE

## 2019-08-12 PROCEDURE — 3075F PR MOST RECENT SYSTOLIC BLOOD PRESS GE 130-139MM HG: ICD-10-PCS | Mod: HCNC,CPTII,S$GLB, | Performed by: INTERNAL MEDICINE

## 2019-08-12 PROCEDURE — 93010 EKG 12-LEAD: ICD-10-PCS | Mod: HCNC,S$GLB,, | Performed by: INTERNAL MEDICINE

## 2019-08-12 PROCEDURE — 3078F DIAST BP <80 MM HG: CPT | Mod: HCNC,CPTII,S$GLB, | Performed by: INTERNAL MEDICINE

## 2019-08-12 RX ORDER — INDOMETHACIN 50 MG/1
50 CAPSULE ORAL 2 TIMES DAILY WITH MEALS
Qty: 21 CAPSULE | Refills: 2 | Status: SHIPPED | OUTPATIENT
Start: 2019-08-12 | End: 2019-08-12 | Stop reason: SDUPTHER

## 2019-08-12 RX ORDER — INDOMETHACIN 50 MG/1
CAPSULE ORAL
Qty: 189 CAPSULE | Refills: 2 | Status: SHIPPED | OUTPATIENT
Start: 2019-08-12 | End: 2020-10-25 | Stop reason: SDUPTHER

## 2019-08-12 NOTE — LETTER
"August 12, 2019    Octavio Constantino  [address] [place] [address]             [facility]  [address]  [facility] [address]  Phone: [phone] Dear Mr. Constantino:    Thank you for allowing me to serve you and perform your Executive Health exam on 8/12/2019. This letter will serve as a brief summary of the physical findings and laboratory/studies performed and recommendations at this time. Today's assessment is  Essentially normal. Your asthma is being well controlled with the advair and everything else is good. Lose 10 pounds and you will get a "A" across the board.         If you have any questions or concerns, please don't hesitate to call.    Sincerely,        Raphael Carrington MD     "

## 2019-08-12 NOTE — PROGRESS NOTES
Subjective:       Patient ID: Octavio Constantino is a 69 y.o. male.    Chief Complaint: Annual Exam    HPI  68 yo  still working part time, [place] Working with [employer]. He has had no significant medical encounters, and feels well in general, no asthma since using the Advair regularly. He has some torticollis in his left neck with radiculopathy but that has resolved. He request a refill for indocin in case of a rare attack of gout.  Review of Systems   Constitutional: Negative.    HENT: Negative.    Eyes: Negative.    Respiratory: Negative.         Mild intermittent  Asthma. No problems while on daily Advair.   Cardiovascular: Negative.    Gastrointestinal: Negative.    Genitourinary: Negative.    Musculoskeletal: Positive for neck stiffness.        Hx of rare intermittent gout that responds to indocin.   Skin: Negative.    Neurological: Negative.    Psychiatric/Behavioral: Negative.    All other systems reviewed and are negative.      Objective:      Physical Exam   Constitutional: He is oriented to person, place, and time. He appears well-developed and well-nourished.   HENT:   Head: Normocephalic and atraumatic.   Right Ear: External ear normal.   Left Ear: External ear normal.   Eyes: Pupils are equal, round, and reactive to light. Conjunctivae and EOM are normal.   Neck: Normal range of motion. Neck supple.   Cardiovascular: Normal rate, regular rhythm and normal heart sounds.   Pulmonary/Chest: Effort normal and breath sounds normal.   Peak flow 480l/min   Abdominal: Soft. Bowel sounds are normal.   Musculoskeletal: Normal range of motion.   Neurological: He is alert and oriented to person, place, and time. He has normal reflexes.   Skin: Skin is warm and dry.   Psychiatric: He has a normal mood and affect. His behavior is normal. Judgment and thought content normal.       Assessment:       1. Annual physical exam        Plan:           Labs: Slightly  elevated TSH with a normal T_4, all other parameters are normal. Chest x-ray is clear and EKG is normal. With incomplete RBBB. IMp Healthy Male with good control of asthma.

## 2020-02-21 ENCOUNTER — PES CALL (OUTPATIENT)
Dept: ADMINISTRATIVE | Facility: CLINIC | Age: 70
End: 2020-02-21

## 2020-03-09 ENCOUNTER — OFFICE VISIT (OUTPATIENT)
Dept: FAMILY MEDICINE | Facility: CLINIC | Age: 70
End: 2020-03-09
Payer: MEDICARE

## 2020-03-09 VITALS
SYSTOLIC BLOOD PRESSURE: 122 MMHG | WEIGHT: 212.5 LBS | BODY MASS INDEX: 28.78 KG/M2 | HEART RATE: 71 BPM | TEMPERATURE: 98 F | DIASTOLIC BLOOD PRESSURE: 70 MMHG | HEIGHT: 72 IN | OXYGEN SATURATION: 97 %

## 2020-03-09 DIAGNOSIS — M46.99 UNSPECIFIED INFLAMMATORY SPONDYLOPATHY, MULTIPLE SITES IN SPINE: ICD-10-CM

## 2020-03-09 DIAGNOSIS — J45.909 ASTHMA, UNSPECIFIED ASTHMA SEVERITY, UNSPECIFIED WHETHER COMPLICATED, UNSPECIFIED WHETHER PERSISTENT: ICD-10-CM

## 2020-03-09 DIAGNOSIS — E66.3 OVERWEIGHT (BMI 25.0-29.9): ICD-10-CM

## 2020-03-09 DIAGNOSIS — I10 ESSENTIAL HYPERTENSION: ICD-10-CM

## 2020-03-09 DIAGNOSIS — Z00.00 ENCOUNTER FOR PREVENTIVE HEALTH EXAMINATION: Primary | ICD-10-CM

## 2020-03-09 DIAGNOSIS — M10.9 GOUT, UNSPECIFIED CAUSE, UNSPECIFIED CHRONICITY, UNSPECIFIED SITE: ICD-10-CM

## 2020-03-09 PROCEDURE — G0439 PR MEDICARE ANNUAL WELLNESS SUBSEQUENT VISIT: ICD-10-PCS | Mod: HCNC,S$GLB,, | Performed by: NURSE PRACTITIONER

## 2020-03-09 PROCEDURE — 3078F PR MOST RECENT DIASTOLIC BLOOD PRESSURE < 80 MM HG: ICD-10-PCS | Mod: HCNC,CPTII,S$GLB, | Performed by: NURSE PRACTITIONER

## 2020-03-09 PROCEDURE — 99499 UNLISTED E&M SERVICE: CPT | Mod: S$GLB,,, | Performed by: NURSE PRACTITIONER

## 2020-03-09 PROCEDURE — 3074F SYST BP LT 130 MM HG: CPT | Mod: HCNC,CPTII,S$GLB, | Performed by: NURSE PRACTITIONER

## 2020-03-09 PROCEDURE — 3074F PR MOST RECENT SYSTOLIC BLOOD PRESSURE < 130 MM HG: ICD-10-PCS | Mod: HCNC,CPTII,S$GLB, | Performed by: NURSE PRACTITIONER

## 2020-03-09 PROCEDURE — 3078F DIAST BP <80 MM HG: CPT | Mod: HCNC,CPTII,S$GLB, | Performed by: NURSE PRACTITIONER

## 2020-03-09 PROCEDURE — G0439 PPPS, SUBSEQ VISIT: HCPCS | Mod: HCNC,S$GLB,, | Performed by: NURSE PRACTITIONER

## 2020-03-09 PROCEDURE — 99999 PR PBB SHADOW E&M-EST. PATIENT-LVL V: CPT | Mod: PBBFAC,HCNC,, | Performed by: NURSE PRACTITIONER

## 2020-03-09 PROCEDURE — 99499 RISK ADDL DX/OHS AUDIT: ICD-10-PCS | Mod: S$GLB,,, | Performed by: NURSE PRACTITIONER

## 2020-03-09 PROCEDURE — 99999 PR PBB SHADOW E&M-EST. PATIENT-LVL V: ICD-10-PCS | Mod: PBBFAC,HCNC,, | Performed by: NURSE PRACTITIONER

## 2020-03-09 NOTE — PATIENT INSTRUCTIONS
Counseling and Referral of Other Preventative  (Italic type indicates deductible and co-insurance are waived)    Patient Name: Octavio Constantino  Today's Date: 3/12/2020    Health Maintenance       Date Due Completion Date    TETANUS VACCINE 04/25/1966 ---    Shingles Vaccine (2 of 3) 08/23/2017 6/28/2017    Influenza Vaccine (1) 09/01/2019 10/3/2018    Lipid Panel 08/12/2024 8/12/2019    Colonoscopy 05/30/2028 5/30/2018        No orders of the defined types were placed in this encounter.    The following information is provided to all patients.  This information is to help you find resources for any of the problems found today that may be affecting your health:                Living healthy guide: www.[facility].[place].gov      Understanding Diabetes: www.diabetes.org      Eating healthy: www.cdc.gov/healthyweight      [facility] home safety checklist: www.cdc.gov/steadi/patient.html      Agency on Aging: www.goea.[place].[facility]      Alcoholics anonymous (AA): www.aa.org      Physical Activity: www.[place].nih.gov/on7jmqv      Tobacco use: www.quitwithusla.org

## 2020-03-09 NOTE — PROGRESS NOTES
Octavio Constantino presented for a  Medicare AWV and comprehensive Health Risk Assessment today. The following components were reviewed and updated:    · Medical history  · Family History  · Social history  · Allergies and Current Medications  · Health Risk Assessment  · Health Maintenance  · Care Team     ** See Completed Assessments for Annual Wellness Visit within the encounter summary.**       The following assessments were completed:  · Living Situation  · CAGE  · Depression Screening  · Timed Get Up and Go  · Whisper Test  · Cognitive Function Screening  · Nutrition Screening  · ADL Screening  · PAQ Screening    Vitals:    03/09/20 1056   BP: 122/70   BP Location: Right arm   Patient Position: Sitting   BP Method: Large (Manual)   Pulse: 71   Temp: 98.4 °F (36.9 °C)   TempSrc: Oral   SpO2: 97%   Weight: 96.4 kg (212 lb 8.4 oz)   Height: 6' (1.829 m)     Body mass index is 28.82 kg/m².     Physical Exam   Constitutional: He is oriented to person, place, and time. He appears well-developed and well-nourished. No distress.   HENT:   Head: Normocephalic and atraumatic.   Eyes: Pupils are equal, round, and reactive to light. EOM are normal.   Neck: Neck supple. No JVD present. No tracheal deviation present.   Cardiovascular: Normal rate, regular rhythm, normal heart sounds and intact distal pulses.   No murmur heard.  Pulmonary/Chest: Effort normal and breath sounds normal. No respiratory distress. He has no wheezes. He has no rales.   Abdominal: Soft. Bowel sounds are normal. He exhibits no distension and no mass. There is no tenderness.   Musculoskeletal: Normal range of motion. He exhibits no edema or tenderness.   Neurological: He is alert and oriented to person, place, and time. Coordination normal.   Skin: Skin is warm and dry. No erythema. No pallor.   Psychiatric: He has a normal mood and affect. His behavior is normal. Judgment and thought content normal. Cognition and memory are normal. He expresses no  homicidal and no suicidal ideation.   Nursing note and vitals reviewed.        Diagnoses and health risks identified today and associated recommendations/orders:    1. Encounter for preventive health examination    2. Essential hypertension  Chronic; stable on medication.  Followed by PCP.    3. Asthma, unspecified asthma severity, unspecified whether complicated, unspecified whether persistent  Chronic; stable on medication.  Followed by PCP.    4. 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  Followed by PCP.    5. Gout, unspecified cause, unspecified chronicity, unspecified site  Chronic; stable on medication.  Followed by PCP.    6. Overweight (BMI 25.0-29.9)  Chronic, stable. Therapeutic lifestyle changes discussed. Followed by PCP.      Provided Octavio with a 5-10 year written screening schedule and personal prevention plan. Recommendations were developed using the USPSTF age appropriate recommendations. Education, counseling, and referrals were provided as needed. After Visit Summary printed and given to patient which includes a list of additional screenings\tests needed.    Follow up for Annual Wellness Visit in 1 year.    Syeda Hodge NP         I offered to discuss end of life issues, including information on how to make advance directives that the patient could use to name someone who would make medical decisions on their behalf if they became too ill to make themselves.    ___Patient declined  _X_Patient is interested, I provided paper work and offered to discuss.

## 2020-03-12 PROBLEM — M10.9 GOUT: Status: ACTIVE | Noted: 2020-03-12

## 2020-03-12 PROBLEM — M46.99 UNSPECIFIED INFLAMMATORY SPONDYLOPATHY, MULTIPLE SITES IN SPINE: Status: ACTIVE | Noted: 2020-03-12

## 2020-03-12 PROBLEM — J45.909 ASTHMA: Status: ACTIVE | Noted: 2020-03-12

## 2020-05-25 RX ORDER — LISINOPRIL 10 MG/1
TABLET ORAL
Qty: 90 TABLET | Refills: 4 | Status: SHIPPED | OUTPATIENT
Start: 2020-05-25 | End: 2020-11-09

## 2020-08-14 ENCOUNTER — PATIENT OUTREACH (OUTPATIENT)
Dept: OTHER | Facility: OTHER | Age: 70
End: 2020-08-14

## 2020-08-14 NOTE — PROGRESS NOTES
Digital Medicine: Health  Introduction    Introduced Octavio Constantino to Digital Medicine. Discussed health  role and recommended lifestyle modifications.    Called to introduce patient to Digital Med HTN program    The history is provided by the patient.           Additional Enrollment Details: Has not worked w/ a health  before - Appears interested but hesitant to work together. States it will be hard to break habits at his age. Agreed to continue these discussions    Discovered BP readings are not 1 hour after BP medication. Patient now confirms understanding of accurate technique/timing related to meds and plans to work on improving this.     HYPERTENSION  Explained hypertension digital medicine goals including BP goal less than or equal to 130/80mmHg, improved convenience of BP management and reduced risk of heart attack, kidney failure, stroke, eye disease, dementia, and death.      Explained non-pharmacologic therapies like low salt diet and physical activity can reduce blood pressure. .      Explained that we expect patient to submit several blood pressure readings per week at random times of the day, but at least 30 minutes after taking blood pressure medications. Instructed patient not to allow anyone else to use their blood pressure monitor and phone as data submitted is directly entered into medical record. Reviewed and confirmed appropriate blood pressure monitoring technique.         Explained to the patient that the Digital Medicine team is not available for emergencies. Advised patient call [facility] ([phone] or [phone]) or [phone] if needed.               Diet-Assessed  24 hour dietary recall  Patient reports eating or drinking the following: Big coffee drinker    Intervention(s): reducing caffeine and DASH diet  Additional diet details: Tries to stay away from added salts but admits he struggles w/ the sneaky sodium in things like seafood (crawfish especially). Agreed to  continue sodium discussions.     Physical Activity-Not assessed    Medication Adherence-Medication Adherence not addressed.      Substance, Sleep, Stress-Not assessed      Continue current diet/physical activity routine.  Instructed to charge device.  Provided patient education.  Reviewed Device Techniques.     Addressed any questions or concerns and patient has my contact information if needed prior to next outreach. Patient verbalizes understanding.      Explained the importance of self-monitoring and medication adherence. Encouraged the patient to communicate with their health  for lifestyle modifications to help improve or maintain a healthy lifestyle.        Sent link to [facility] [employer] [facility] webpages and my contact information via [employer] for future questions.        Explained to the patient that the Digital Medicine team is not available for emergencies. Advised patient call [facility] ([phone] or [phone]) or [phone] if needed.       There are no preventive care reminders to display for this patient.    Last 5 Patient Entered Readings                                      Current 30 Day Average: 134/78     Recent Readings 8/14/2020 8/14/2020 8/13/2020 8/13/2020 8/13/2020    SBP (mmHg) 140 147 123 117 127    DBP (mmHg) 83 81 66 70 67    Pulse 64 66 82 83 83

## 2020-08-17 ENCOUNTER — PATIENT OUTREACH (OUTPATIENT)
Dept: OTHER | Facility: OTHER | Age: 70
End: 2020-08-17

## 2020-08-17 DIAGNOSIS — I10 ESSENTIAL HYPERTENSION: Primary | ICD-10-CM

## 2020-08-17 NOTE — PROGRESS NOTES
Digital Medicine: Clinician Introduction    Octavio Constantino is a 70 y.o. male who is newly enrolled in the Digital Medicine Clinic.    Called patient regarding HDMP (Hypertension Digital Medicine Program)    HPI  Patients BP average is currently 130/77 mmHg.    Patient denies s/s of hypotension (lightheadedness, dizziness, nausea, fatigue) associated with low readings. Instructed patient to inform me if this occurs, patient confirms understanding.    Patient denies s/s of hypertension (SOB, CP, severe headaches, changes in vision) associated with high readings. Instructed patient to go to the ED if BP >180/110 and accompanied by hypertensive s/s, patient confirms understanding.    Patient takes his Lisinopril 10 mg in the morning. He was checking his BP first thing in the morning before his medication. He now knows to wait 1 hour after his medication to check his BP.     The history is provided by the patient.      Review of patient's allergies indicates:  No Known Allergies  Completed Medication Reconciliation  Verified pharmacy information.    HYPERTENSION  Explained hypertension digital medicine goals including BP goal less than or equal to 130/80mmHg, improved convenience of BP management and reduced risk of heart attack, kidney failure, stroke, eye disease, dementia, and death.     Explained non-pharmacologic therapies like low salt diet and physical activity can reduce blood pressure.       Explained that we expect patient to submit several blood pressure readings per week at random times of the day, but at least 30 minutes after taking blood pressure medications. Instructed patient not to allow anyone else to use their blood pressure monitor and phone as data submitted is directly entered into medical record. Reviewed and confirmed appropriate blood pressure monitoring technique.         Reviewed signs/symptoms of hypertension (headache, changes in vision, chest pain, shortness of breath)   Reviewed  signs/symptoms of hypotension (lightheaded, dizziness, weakness)     Patient's BP goal is less than or equal to 130/80. Patients BP average is 130/77 mmHg, which is at goal, per 2017 ACC/AHA Hypertension Guidelines. Patient attributes current blood pressure to: lisinopril doing its job        Last 5 Patient Entered Readings                                      Current 30 Day Average: 130/77     Recent Readings 8/15/2020 8/15/2020 8/14/2020 8/14/2020 8/14/2020    SBP (mmHg) 123 135 122 140 147    DBP (mmHg) 76 78 74 83 81    Pulse 63 65 98 64 66              Depression Screening  Octavio Constantino screened negative on the depression screening.     Sleep Apnea Screening  Patient not previously diagnosed with MAGDALENO       Medication Affordability Screening  Patient did not answer the medication affordability questionnaires. Patient is currently not having problems affording medications    Medication Adherence-Medication adherence was assessed.  Patient continue taking medication as prescribed.            ASSESSMENT(S)  Patients BP average is 130/77 mmHg, which is at goal. Patient's BP goal is less than or equal to 130/80 per 2017 ACC/AHA Hypertension Guidelines.      Hypertension Plan  Labs ordered. Scheduled BMP Expected Lab Date: 8/25/2020  Continue current therapy.  Continue current diet/physical activity routine.  Provided patient education. Counseled patient to wait an hour after his medications to check his BP and at least 30 mins after his coffee, exercise, or food  Will call patient in a few months, sooner if needed. Patient knows to reach out at any time for any questions or concerns.        Addressed any questions or concerns and patient has my contact information if needed prior to next outreach. Patient verbalizes understanding.      Explained the importance of self-monitoring and medication adherence. Encouraged the patient to communicate with their health  for lifestyle modifications to help  improve or maintain a healthy lifestyle.        Sent link to [facility] webpages and my contact information via "[employer]" for future questions.        Explained to the patient that the Digital Medicine team is not available for emergencies. Advised patient call [facility] ([phone] or [phone]) or [phone] if needed.         There are no preventive care reminders to display for this patient.    Current Medication Regimen:  Hypertension Medications             lisinopriL 10 MG tablet TAKE 1 TABLET EVERY MORNING        Ree Jay, PharmD  Digital Medicine Clinical Pharmacist  [phone]

## 2020-08-31 PROCEDURE — 99454 REM MNTR PHYSIOL PARAM 16-30: CPT | Mod: S$GLB,,, | Performed by: INTERNAL MEDICINE

## 2020-08-31 PROCEDURE — 99454 PR REMOTE MNTR, PHYS PARAM, INITIAL, EA 30 DAYS: ICD-10-PCS | Mod: S$GLB,,, | Performed by: INTERNAL MEDICINE

## 2020-09-01 ENCOUNTER — LAB VISIT (OUTPATIENT)
Dept: LAB | Facility: HOSPITAL | Age: 70
End: 2020-09-01
Attending: INTERNAL MEDICINE
Payer: MEDICARE

## 2020-09-01 DIAGNOSIS — I10 ESSENTIAL HYPERTENSION: ICD-10-CM

## 2020-09-01 PROCEDURE — 36415 COLL VENOUS BLD VENIPUNCTURE: CPT | Mod: HCNC,PO

## 2020-09-01 PROCEDURE — 80048 BASIC METABOLIC PNL TOTAL CA: CPT | Mod: HCNC

## 2020-09-02 LAB
ANION GAP SERPL CALC-SCNC: 9 MMOL/L (ref 8–16)
BUN SERPL-MCNC: 14 MG/DL (ref 8–23)
CALCIUM SERPL-MCNC: 9.1 MG/DL (ref 8.7–10.5)
CHLORIDE SERPL-SCNC: 104 MMOL/L (ref 95–110)
CO2 SERPL-SCNC: 25 MMOL/L (ref 23–29)
CREAT SERPL-MCNC: 0.9 MG/DL (ref 0.5–1.4)
EST. GFR  (AFRICAN AMERICAN): >60 ML/MIN/1.73 M^2
EST. GFR  (NON AFRICAN AMERICAN): >60 ML/MIN/1.73 M^2
GLUCOSE SERPL-MCNC: 118 MG/DL (ref 70–110)
POTASSIUM SERPL-SCNC: 4.4 MMOL/L (ref 3.5–5.1)
SODIUM SERPL-SCNC: 138 MMOL/L (ref 136–145)

## 2020-09-17 ENCOUNTER — PATIENT OUTREACH (OUTPATIENT)
Dept: OTHER | Facility: OTHER | Age: 70
End: 2020-09-17

## 2020-09-30 NOTE — PROGRESS NOTES
"Digital Medicine: Health  Follow-Up    The history is provided by the patient.             Reason for review: Blood pressure at goal        Topics Covered on Call: physical activity and Diet          Diet-Change        Intervention(s): portion control and DASH diet  Additional diet details: Tries to monitor sodium intake - Admits sometimes adding salt to his meals but nothing "heavy".     Physical Activity-Change      He added Body weight exercises at home , 30-40 minute walk or bikeride around the neighborhood. Admits he notices a difference on those days. to His physical activity routine.        He identified the following barriers to physical activity: Knee problems - Discussed physical therapy but patient does not feel this is needed at this time.        Additional physical activity details: Would like to exercise more regularly / daily. Admits he used to exercise daily but fell off track - Set a goal to get back into it / Increase activity routine , Especially now that the weather is improving.     Retiring soon - will have more time to spend outside walking.       Medication Adherence-Medication adherence was assessed.      Substance, Sleep, Stress-Not assessed      Continue current diet/physical activity routine. Goal: Increase activity routine (walking/biking). Continue monitoring sodium intake.   Provided patient education. Discussed dietary (sodium) impact on DBP and why it is important to monitor  Reviewed Device Techniques. Patient feels he can get the most accurate reading by testing later in the afternoon. Confirms readings are true resting readings.     Addressed patient questions and patient has my contact information if needed prior to next outreach. Patient verbalizes understanding.      Explained the importance of self-monitoring and medication adherence. Encouraged the patient to communicate with their health  for lifestyle modifications to help improve or maintain a healthy " lifestyle.            There are no preventive care reminders to display for this patient.    Last 5 Patient Entered Readings                                      Current 30 Day Average: 130/78     Recent Readings 9/25/2020 9/25/2020 9/23/2020 9/23/2020 9/18/2020    SBP (mmHg) 115 135 118 125 120    DBP (mmHg) 70 75 68 69 75    Pulse 94 94 92 93 97

## 2020-11-09 ENCOUNTER — PATIENT OUTREACH (OUTPATIENT)
Dept: OTHER | Facility: OTHER | Age: 70
End: 2020-11-09

## 2020-11-09 DIAGNOSIS — I10 ESSENTIAL HYPERTENSION: Primary | ICD-10-CM

## 2020-11-09 RX ORDER — LISINOPRIL 10 MG/1
20 TABLET ORAL EVERY MORNING
Qty: 90 TABLET | Refills: 4
Start: 2020-11-09 | End: 2020-11-23

## 2020-11-09 NOTE — PROGRESS NOTES
Digital Medicine: Clinician Follow-Up    Called patient for routine follow up regarding HDMP (Hypertension Digital Medicine Program)    HPI  Patients BP average is currently 132/79 mmHg.      The history is provided by the patient.      Review of patient's allergies indicates:  No Known Allergies  Follow-up reason(s): routine follow up.     Hypertension    Patient's blood pressure is stable.   Patient is not experiencing signs/symptoms of hypotension.  Patient is not experiencing signs/symptoms of hypertension.            Last 5 Patient Entered Readings                                      Current 30 Day Average: 132/79     Recent Readings 11/5/2020 11/5/2020 11/4/2020 11/3/2020 11/3/2020    SBP (mmHg) 129 131 122 123 129    DBP (mmHg) 77 78 75 71 68    Pulse 77 74 93 85 87                 Depression Screening  Did not address depression screening.    Sleep Apnea Screening    Did not address sleep apnea screening.     Medication Affordability Screening  Did not address medication affordability screening.     Medication Adherence-Medication adherence was assessed.          ASSESSMENT(S)  Patients BP average is 132/79 mmHg, which is above goal. Patient's BP goal is less than or equal to 130/80.     Hypertension Plan  Hypertension Medication Change. Increase Lisinopril to 20 mg daily.  Additional monitoring needed.  Continue current diet/physical activity routine.  Will call patient in a few weeks, sooner if needed. Patient knows to reach out at any time for any questions or concerns.        Addressed patient questions and patient has my contact information if needed prior to next outreach. Patient verbalizes understanding.             There are no preventive care reminders to display for this patient.  There are no preventive care reminders to display for this patient.      Hypertension Medications             lisinopriL 10 MG tablet Take 2 tablets (20 mg total) by mouth every morning.          Ree Jay,  PharmD  Digital Medicine Clinician  [phone]

## 2020-11-11 ENCOUNTER — PATIENT OUTREACH (OUTPATIENT)
Dept: OTHER | Facility: OTHER | Age: 70
End: 2020-11-11

## 2020-11-14 ENCOUNTER — IMMUNIZATION (OUTPATIENT)
Dept: INTERNAL MEDICINE | Facility: CLINIC | Age: 70
End: 2020-11-14
Payer: MEDICARE

## 2020-11-14 PROCEDURE — G0008 FLU VACCINE (QUAD) GREATER THAN OR EQUAL TO 3YO PRESERVATIVE FREE IM: ICD-10-PCS | Mod: HCNC,S$GLB,, | Performed by: FAMILY MEDICINE

## 2020-11-14 PROCEDURE — G0008 ADMIN INFLUENZA VIRUS VAC: HCPCS | Mod: HCNC,S$GLB,, | Performed by: FAMILY MEDICINE

## 2020-11-14 PROCEDURE — 90686 FLU VACCINE (QUAD) GREATER THAN OR EQUAL TO 3YO PRESERVATIVE FREE IM: ICD-10-PCS | Mod: HCNC,S$GLB,, | Performed by: FAMILY MEDICINE

## 2020-11-14 PROCEDURE — 90686 IIV4 VACC NO PRSV 0.5 ML IM: CPT | Mod: HCNC,S$GLB,, | Performed by: FAMILY MEDICINE

## 2020-11-23 ENCOUNTER — PATIENT OUTREACH (OUTPATIENT)
Dept: OTHER | Facility: OTHER | Age: 70
End: 2020-11-23

## 2020-11-23 DIAGNOSIS — I10 ESSENTIAL HYPERTENSION: ICD-10-CM

## 2020-11-23 RX ORDER — LISINOPRIL 20 MG/1
20 TABLET ORAL DAILY
Qty: 90 TABLET | Refills: 1 | Status: SHIPPED | OUTPATIENT
Start: 2020-11-23 | End: 2021-05-03 | Stop reason: SDUPTHER

## 2020-11-23 NOTE — PROGRESS NOTES
Attempted to reach patient for routine follow up and follow up on lisinopril dose increase. Reviewed available blood pressure readings and labs. Per 2017 ACC/AHA Hypertension Guidelines, current 30-day average is 131/78 and is at goal.     Last 5 Patient Entered Readings                                      Current 30 Day Average: 131/78     Recent Readings 11/18/2020 11/13/2020 11/13/2020 11/12/2020 11/12/2020    SBP (mmHg) 128 117 123 120 128    DBP (mmHg) - 72 73 72 67    Pulse - 72 74 84 85          BMP  Lab Results   Component Value Date     09/01/2020    K 4.4 09/01/2020     09/01/2020    CO2 25 09/01/2020    BUN 14 09/01/2020    CREATININE 0.9 09/01/2020    CALCIUM 9.1 09/01/2020    ANIONGAP 9 09/01/2020    ESTGFRAFRICA >60.0 09/01/2020    EGFRNONAA >60.0 09/01/2020       Left a voicemail and requested patient call back.    Will continue to monitor regularly. Will follow up in 1 week, sooner if blood pressure begins to trend up.    Ree Jay, PharmD  Digital Medicine Clinician  [phone]

## 2020-11-23 NOTE — PROGRESS NOTES
Digital Medicine: Clinician Follow-Up    Called patient for routine follow up regarding HDMP (Hypertension Digital Medicine Program) and follow up on lisinopril dose increase.     HPI  Patients BP average is currently 131/78 mmHg.      The history is provided by the patient.   Follow-up reason(s): medication change follow-up.     Hypertension    Readings are trending down due to medication adherence.       Patient is not experiencing signs/symptoms of hypotension.  Patient is not experiencing signs/symptoms of hypertension.      Additional Follow-up details: Patient increased lisinopril to 20 mg on 11/10. He takes it in the morning and knows to wait at least an hour after his medication to check his BP. He reports no side effects or concerns with the dose increase.     Patient did make medication change.    Is patient tolerating med change? yes            Last 5 Patient Entered Readings                                      Current 30 Day Average: 131/78     Recent Readings 11/18/2020 11/13/2020 11/13/2020 11/12/2020 11/12/2020    SBP (mmHg) 128 117 123 120 128    DBP (mmHg) - 72 73 72 67    Pulse - 72 74 84 85                 Depression Screening  Did not address depression screening.    Sleep Apnea Screening    Did not address sleep apnea screening.     Medication Affordability Screening  Did not address medication affordability screening.     Medication Adherence-Medication adherence was assessed.          ASSESSMENT(S)  Patients BP average is 131/78 mmHg, which is at goal. Patient's BP goal is less than or equal to 130/80.    Hypertension Plan  Continue current therapy.  Continue current diet/physical activity routine.  Will call patient in a few months, sooner if needed. Patient knows to reach out at any time for any questions or concerns.        Addressed patient questions and patient has my contact information if needed prior to next outreach. Patient verbalizes understanding.             There are no  preventive care reminders to display for this patient.  There are no preventive care reminders to display for this patient.      Hypertension Medications             lisinopriL 10 MG tablet Take 2 tablets (20 mg total) by mouth every morning.          Ree Jay, PharmD  Digital Medicine Clinician  [phone]

## 2020-12-17 ENCOUNTER — PATIENT OUTREACH (OUTPATIENT)
Dept: OTHER | Facility: OTHER | Age: 70
End: 2020-12-17

## 2020-12-17 NOTE — PROGRESS NOTES
Digital Medicine: Health  Follow-Up    Calling to f/u w/ patient - BP avg at goal: 126/77    Reports no questions or concerns regarding BP - Feeling good and still monitoring lifestyle habits for better BP management. Encouraged    Mr. Cunningham has my number and knows to call if needed.  Will continue to monitor and f/u as scheduled.    The history is provided by the patient.             Reason for review: Blood pressure at goal        Topics Covered on Call: physical activity and device use            Diet-no change to diet    No change to diet.  Patient reports eating or drinking the following: Mr. Cunningham reports no changes to his dietary habits - Still monitoring dietary choices/sodium intake and feels confident w/ current routine.    Reports smaller holiday gatherings this year - Not planning to overindulge      Physical Activity-Change      He added walking/biking to His physical activity routine.        Intervention(s): provided exercise ideas         Additional physical activity details: States he gets exercise every other day - Gets outside to walk or ride his bike. Would like to get in the habit of daily activity but states it's not always do-able. Encouraged activity routine along w/ spending time outdoors (safely)      Medication Adherence-Medication adherence was asssessed.  Patient continue taking medication as prescribed.          Recently increased BP meds to 20mg Lisinopril - Feels it's working well for him. No complaints about adjustment.    Substance, Sleep, Stress-change  stress-assessed  Details:still quarantining / social distancing due to COVID - Reports no major stress, States he's adjusted pretty well  Intervention(s):    Sleep-not assessed  Details:  Intervention(s):    Alcohol -not assessed  Details:  Intervention(s):    Tobacco-Not Assessed  Details:  Intervention(s):          Additional monitoring needed. Mr. Cunningham plans to continue submitting regular BP readings for us to  monitor  Continue current diet/physical activity routine. Patient plans to continue activity routine (walk/bike) along w/ monitoring dietary choices/sodium intake  Provided patient education.       Addressed patient questions and patient has my contact information if needed prior to next outreach. Patient verbalizes understanding.      Explained the importance of self-monitoring and medication adherence. Encouraged the patient to communicate with their health  for lifestyle modifications to help improve or maintain a healthy lifestyle.               There are no preventive care reminders to display for this patient.      Last 5 Patient Entered Readings                                      Current 30 Day Average: 126/77     Recent Readings 12/15/2020 12/15/2020 12/14/2020 12/14/2020 12/14/2020    SBP (mmHg) 127 139 120 116 132    DBP (mmHg) 79 79 71 72 79    Pulse 70 71 95 94 97

## 2021-01-27 ENCOUNTER — PATIENT MESSAGE (OUTPATIENT)
Dept: ADMINISTRATIVE | Facility: OTHER | Age: 71
End: 2021-01-27

## 2021-01-29 ENCOUNTER — IMMUNIZATION (OUTPATIENT)
Dept: PHARMACY | Facility: CLINIC | Age: 71
End: 2021-01-29
Payer: MEDICARE

## 2021-01-29 DIAGNOSIS — Z23 NEED FOR VACCINATION: Primary | ICD-10-CM

## 2021-02-26 ENCOUNTER — IMMUNIZATION (OUTPATIENT)
Dept: PHARMACY | Facility: CLINIC | Age: 71
End: 2021-02-26
Payer: MEDICARE

## 2021-02-26 DIAGNOSIS — Z23 NEED FOR VACCINATION: Primary | ICD-10-CM

## 2021-10-22 RX ORDER — INDOMETHACIN 50 MG/1
50 CAPSULE ORAL 2 TIMES DAILY WITH MEALS
Qty: 28 CAPSULE | Refills: 2 | Status: SHIPPED | OUTPATIENT
Start: 2021-10-22 | End: 2021-11-05

## 2021-11-15 ENCOUNTER — IMMUNIZATION (OUTPATIENT)
Dept: PHARMACY | Facility: CLINIC | Age: 71
End: 2021-11-15
Payer: MEDICARE

## 2021-11-15 DIAGNOSIS — Z23 NEED FOR VACCINATION: Primary | ICD-10-CM

## 2021-12-06 DIAGNOSIS — R06.00 DYSPNEA, UNSPECIFIED TYPE: Primary | ICD-10-CM

## 2021-12-08 ENCOUNTER — OFFICE VISIT (OUTPATIENT)
Dept: PULMONOLOGY | Facility: CLINIC | Age: 71
End: 2021-12-08
Payer: MEDICARE

## 2021-12-08 ENCOUNTER — HOSPITAL ENCOUNTER (OUTPATIENT)
Dept: CARDIOLOGY | Facility: CLINIC | Age: 71
Discharge: HOME | End: 2021-12-08
Payer: MEDICARE

## 2021-12-08 ENCOUNTER — HOSPITAL ENCOUNTER (OUTPATIENT)
Dept: RADIOLOGY | Facility: HOSPITAL | Age: 71
Discharge: HOME | End: 2021-12-08
Attending: INTERNAL MEDICINE
Payer: MEDICARE

## 2021-12-08 ENCOUNTER — CLINICAL SUPPORT (OUTPATIENT)
Dept: INTERNAL MEDICINE | Facility: CLINIC | Age: 71
End: 2021-12-08
Payer: MEDICARE

## 2021-12-08 VITALS
BODY MASS INDEX: 27.09 KG/M2 | SYSTOLIC BLOOD PRESSURE: 137 MMHG | HEART RATE: 76 BPM | DIASTOLIC BLOOD PRESSURE: 72 MMHG | HEIGHT: 72 IN | RESPIRATION RATE: 12 BRPM | WEIGHT: 200 LBS

## 2021-12-08 DIAGNOSIS — Z12.5 ENCOUNTER FOR SCREENING FOR MALIGNANT NEOPLASM OF PROSTATE: ICD-10-CM

## 2021-12-08 DIAGNOSIS — Z00.00 ANNUAL PHYSICAL EXAM: Primary | ICD-10-CM

## 2021-12-08 DIAGNOSIS — R06.00 DYSPNEA, UNSPECIFIED TYPE: ICD-10-CM

## 2021-12-08 DIAGNOSIS — R79.9 ABNORMAL FINDING OF BLOOD CHEMISTRY, UNSPECIFIED: ICD-10-CM

## 2021-12-08 DIAGNOSIS — R53.81 OTHER MALAISE: ICD-10-CM

## 2021-12-08 LAB
ALBUMIN SERPL BCP-MCNC: 3.7 G/DL (ref 3.5–5.2)
ALP SERPL-CCNC: 59 U/L (ref 55–135)
ALT SERPL W/O P-5'-P-CCNC: 6 U/L (ref 10–44)
ANION GAP SERPL CALC-SCNC: 10 MMOL/L (ref 8–16)
AST SERPL-CCNC: 20 U/L (ref 10–40)
BILIRUB SERPL-MCNC: 0.9 MG/DL (ref 0.1–1)
BUN SERPL-MCNC: 12 MG/DL (ref 8–23)
CALCIUM SERPL-MCNC: 9.9 MG/DL (ref 8.7–10.5)
CHLORIDE SERPL-SCNC: 99 MMOL/L (ref 95–110)
CHOLEST SERPL-MCNC: 182 MG/DL (ref 120–199)
CHOLEST/HDLC SERPL: 2.4 {RATIO} (ref 2–5)
CO2 SERPL-SCNC: 27 MMOL/L (ref 23–29)
COMPLEXED PSA SERPL-MCNC: 1.5 NG/ML (ref 0–4)
CREAT SERPL-MCNC: 0.8 MG/DL (ref 0.5–1.4)
ERYTHROCYTE [DISTWIDTH] IN BLOOD BY AUTOMATED COUNT: 13.1 % (ref 11.5–14.5)
EST. GFR  (AFRICAN AMERICAN): >60 ML/MIN/1.73 M^2
EST. GFR  (NON AFRICAN AMERICAN): >60 ML/MIN/1.73 M^2
ESTIMATED AVG GLUCOSE: 108 MG/DL (ref 68–131)
GLUCOSE SERPL-MCNC: 101 MG/DL (ref 70–110)
HBA1C MFR BLD: 5.4 % (ref 4–5.6)
HCT VFR BLD AUTO: 46.1 % (ref 40–54)
HDLC SERPL-MCNC: 75 MG/DL (ref 40–75)
HDLC SERPL: 41.2 % (ref 20–50)
HGB BLD-MCNC: 14.6 G/DL (ref 14–18)
LDLC SERPL CALC-MCNC: 91.6 MG/DL (ref 63–159)
MCH RBC QN AUTO: 29.9 PG (ref 27–31)
MCHC RBC AUTO-ENTMCNC: 31.7 G/DL (ref 32–36)
MCV RBC AUTO: 95 FL (ref 82–98)
NONHDLC SERPL-MCNC: 107 MG/DL
PLATELET # BLD AUTO: 298 K/UL (ref 150–450)
PMV BLD AUTO: 10.1 FL (ref 9.2–12.9)
POTASSIUM SERPL-SCNC: 4.8 MMOL/L (ref 3.5–5.1)
PROT SERPL-MCNC: 6.9 G/DL (ref 6–8.4)
RBC # BLD AUTO: 4.88 M/UL (ref 4.6–6.2)
SODIUM SERPL-SCNC: 136 MMOL/L (ref 136–145)
T4 FREE SERPL-MCNC: 0.79 NG/DL (ref 0.71–1.51)
TRIGL SERPL-MCNC: 77 MG/DL (ref 30–150)
TSH SERPL DL<=0.005 MIU/L-ACNC: 4.43 UIU/ML (ref 0.4–4)
WBC # BLD AUTO: 7.07 K/UL (ref 3.9–12.7)

## 2021-12-08 PROCEDURE — 84153 ASSAY OF PSA TOTAL: CPT | Mod: HCNC | Performed by: INTERNAL MEDICINE

## 2021-12-08 PROCEDURE — 84439 ASSAY OF FREE THYROXINE: CPT | Mod: HCNC | Performed by: INTERNAL MEDICINE

## 2021-12-08 PROCEDURE — 93010 EKG 12-LEAD: ICD-10-PCS | Mod: HCNC,S$GLB,, | Performed by: INTERNAL MEDICINE

## 2021-12-08 PROCEDURE — 99499 RISK ADDL DX/OHS AUDIT: ICD-10-PCS | Mod: S$GLB,,, | Performed by: INTERNAL MEDICINE

## 2021-12-08 PROCEDURE — 4010F PR ACE/ARB THEARPY RXD/TAKEN: ICD-10-PCS | Mod: HCNC,CPTII,S$GLB, | Performed by: INTERNAL MEDICINE

## 2021-12-08 PROCEDURE — 4010F ACE/ARB THERAPY RXD/TAKEN: CPT | Mod: HCNC,CPTII,S$GLB, | Performed by: INTERNAL MEDICINE

## 2021-12-08 PROCEDURE — 84443 ASSAY THYROID STIM HORMONE: CPT | Mod: HCNC | Performed by: INTERNAL MEDICINE

## 2021-12-08 PROCEDURE — 83036 HEMOGLOBIN GLYCOSYLATED A1C: CPT | Mod: HCNC | Performed by: INTERNAL MEDICINE

## 2021-12-08 PROCEDURE — 71046 X-RAY EXAM CHEST 2 VIEWS: CPT | Mod: 26,HCNC,, | Performed by: RADIOLOGY

## 2021-12-08 PROCEDURE — 80061 LIPID PANEL: CPT | Mod: HCNC | Performed by: INTERNAL MEDICINE

## 2021-12-08 PROCEDURE — 99387 INIT PM E/M NEW PAT 65+ YRS: CPT | Mod: HCNC,S$GLB,, | Performed by: INTERNAL MEDICINE

## 2021-12-08 PROCEDURE — 71046 XR CHEST PA AND LATERAL: ICD-10-PCS | Mod: 26,HCNC,, | Performed by: RADIOLOGY

## 2021-12-08 PROCEDURE — 93005 ELECTROCARDIOGRAM TRACING: CPT | Mod: HCNC,S$GLB,, | Performed by: INTERNAL MEDICINE

## 2021-12-08 PROCEDURE — 99999 PR PBB SHADOW E&M-EST. PATIENT-LVL III: ICD-10-PCS | Mod: PBBFAC,HCNC,, | Performed by: INTERNAL MEDICINE

## 2021-12-08 PROCEDURE — 99387 PR PREVENTIVE VISIT,NEW,65 & OVER: ICD-10-PCS | Mod: HCNC,S$GLB,, | Performed by: INTERNAL MEDICINE

## 2021-12-08 PROCEDURE — 93010 ELECTROCARDIOGRAM REPORT: CPT | Mod: HCNC,S$GLB,, | Performed by: INTERNAL MEDICINE

## 2021-12-08 PROCEDURE — 99499 UNLISTED E&M SERVICE: CPT | Mod: S$GLB,,, | Performed by: INTERNAL MEDICINE

## 2021-12-08 PROCEDURE — 93005 EKG 12-LEAD: ICD-10-PCS | Mod: HCNC,S$GLB,, | Performed by: INTERNAL MEDICINE

## 2021-12-08 PROCEDURE — 80053 COMPREHEN METABOLIC PANEL: CPT | Mod: HCNC | Performed by: INTERNAL MEDICINE

## 2021-12-08 PROCEDURE — 99999 PR PBB SHADOW E&M-EST. PATIENT-LVL III: CPT | Mod: PBBFAC,HCNC,, | Performed by: INTERNAL MEDICINE

## 2021-12-08 PROCEDURE — 85027 COMPLETE CBC AUTOMATED: CPT | Mod: HCNC | Performed by: INTERNAL MEDICINE

## 2021-12-08 PROCEDURE — 71046 X-RAY EXAM CHEST 2 VIEWS: CPT | Mod: TC,HCNC,FY

## 2021-12-08 RX ORDER — METHYLPREDNISOLONE 4 MG/1
TABLET ORAL
Qty: 21 EACH | Refills: 0 | Status: SHIPPED | OUTPATIENT
Start: 2021-12-08 | End: 2021-12-29

## 2022-01-17 ENCOUNTER — PATIENT MESSAGE (OUTPATIENT)
Dept: ADMINISTRATIVE | Facility: OTHER | Age: 72
End: 2022-01-17
Payer: MEDICARE

## 2022-02-01 ENCOUNTER — PATIENT MESSAGE (OUTPATIENT)
Dept: ADMINISTRATIVE | Facility: OTHER | Age: 72
End: 2022-02-01
Payer: MEDICARE

## 2022-05-16 ENCOUNTER — IMMUNIZATION (OUTPATIENT)
Dept: PHARMACY | Facility: CLINIC | Age: 72
End: 2022-05-16
Payer: MEDICARE

## 2022-05-16 DIAGNOSIS — Z23 NEED FOR VACCINATION: Primary | ICD-10-CM

## 2022-07-21 ENCOUNTER — PATIENT MESSAGE (OUTPATIENT)
Dept: ADMINISTRATIVE | Facility: OTHER | Age: 72
End: 2022-07-21
Payer: MEDICARE

## 2022-10-04 ENCOUNTER — IMMUNIZATION (OUTPATIENT)
Dept: PRIMARY CARE CLINIC | Facility: CLINIC | Age: 72
End: 2022-10-04
Payer: MEDICARE

## 2022-10-04 DIAGNOSIS — Z23 NEED FOR VACCINATION: Primary | ICD-10-CM

## 2022-10-04 PROCEDURE — 91313 COVID-19, MRNA, LNP-S, BIVALENT BOOSTER, PF, 50 MCG/0.5 ML: CPT | Mod: PBBFAC | Performed by: INTERNAL MEDICINE

## 2022-10-04 PROCEDURE — 0134A COVID-19, MRNA, LNP-S, BIVALENT BOOSTER, PF, 50 MCG/0.5 ML: CPT | Mod: CV19,PBBFAC | Performed by: INTERNAL MEDICINE

## 2022-11-02 ENCOUNTER — TELEPHONE (OUTPATIENT)
Dept: PULMONOLOGY | Facility: CLINIC | Age: 72
End: 2022-11-02
Payer: MEDICARE

## 2022-11-02 NOTE — TELEPHONE ENCOUNTER
I called Mr Constantino back about a visit with Dr Carrington. I let him know it can be in December but that schedule is not open yet. I told Mr Constantino I will call him when the schedule opens. Chhaya Raygoza LPN

## 2022-11-02 NOTE — TELEPHONE ENCOUNTER
----- Message from Ashlyn Johnson sent at 11/2/2022  1:56 PM CDT -----  Regarding: appt  Contact: pt [phone]  Pt is calling to schedule a follow up appt with Dr. Carrington. Please call

## 2022-12-14 DIAGNOSIS — R06.00 DYSPNEA, UNSPECIFIED TYPE: Primary | ICD-10-CM

## 2022-12-21 ENCOUNTER — OFFICE VISIT (OUTPATIENT)
Dept: PULMONOLOGY | Facility: CLINIC | Age: 72
End: 2022-12-21
Payer: MEDICARE

## 2022-12-21 ENCOUNTER — HOSPITAL ENCOUNTER (OUTPATIENT)
Dept: CARDIOLOGY | Facility: CLINIC | Age: 72
Discharge: HOME | End: 2022-12-21
Payer: MEDICARE

## 2022-12-21 ENCOUNTER — CLINICAL SUPPORT (OUTPATIENT)
Dept: INTERNAL MEDICINE | Facility: CLINIC | Age: 72
End: 2022-12-21
Payer: MEDICARE

## 2022-12-21 ENCOUNTER — HOSPITAL ENCOUNTER (OUTPATIENT)
Dept: RADIOLOGY | Facility: HOSPITAL | Age: 72
Discharge: HOME | End: 2022-12-21
Attending: INTERNAL MEDICINE
Payer: MEDICARE

## 2022-12-21 VITALS
RESPIRATION RATE: 12 BRPM | SYSTOLIC BLOOD PRESSURE: 128 MMHG | DIASTOLIC BLOOD PRESSURE: 75 MMHG | WEIGHT: 200 LBS | HEART RATE: 71 BPM | BODY MASS INDEX: 27.09 KG/M2 | HEIGHT: 72 IN

## 2022-12-21 DIAGNOSIS — R06.00 DYSPNEA, UNSPECIFIED TYPE: ICD-10-CM

## 2022-12-21 DIAGNOSIS — M48.07 SPINAL STENOSIS, LUMBOSACRAL REGION: ICD-10-CM

## 2022-12-21 DIAGNOSIS — Z12.5 ENCOUNTER FOR SCREENING FOR MALIGNANT NEOPLASM OF PROSTATE: ICD-10-CM

## 2022-12-21 DIAGNOSIS — M54.9 DORSALGIA, UNSPECIFIED: Primary | ICD-10-CM

## 2022-12-21 DIAGNOSIS — Z00.00 ANNUAL PHYSICAL EXAM: Primary | ICD-10-CM

## 2022-12-21 DIAGNOSIS — R79.9 ABNORMAL FINDING OF BLOOD CHEMISTRY, UNSPECIFIED: ICD-10-CM

## 2022-12-21 DIAGNOSIS — R53.83 OTHER FATIGUE: ICD-10-CM

## 2022-12-21 LAB
ALBUMIN SERPL BCP-MCNC: 3.9 G/DL (ref 3.5–5.2)
ALP SERPL-CCNC: 58 U/L (ref 55–135)
ALT SERPL W/O P-5'-P-CCNC: 6 U/L (ref 10–44)
ANION GAP SERPL CALC-SCNC: 6 MMOL/L (ref 8–16)
AST SERPL-CCNC: 23 U/L (ref 10–40)
BILIRUB SERPL-MCNC: 1.2 MG/DL (ref 0.1–1)
BUN SERPL-MCNC: 13 MG/DL (ref 8–23)
CALCIUM SERPL-MCNC: 9.3 MG/DL (ref 8.7–10.5)
CHLORIDE SERPL-SCNC: 103 MMOL/L (ref 95–110)
CHOLEST SERPL-MCNC: 199 MG/DL (ref 120–199)
CHOLEST/HDLC SERPL: 2.4 {RATIO} (ref 2–5)
CO2 SERPL-SCNC: 29 MMOL/L (ref 23–29)
COMPLEXED PSA SERPL-MCNC: 1.7 NG/ML (ref 0–4)
CREAT SERPL-MCNC: 0.8 MG/DL (ref 0.5–1.4)
ERYTHROCYTE [DISTWIDTH] IN BLOOD BY AUTOMATED COUNT: 13.2 % (ref 11.5–14.5)
EST. GFR  (NO RACE VARIABLE): >60 ML/MIN/1.73 M^2
ESTIMATED AVG GLUCOSE: 114 MG/DL (ref 68–131)
GLUCOSE SERPL-MCNC: 104 MG/DL (ref 70–110)
HBA1C MFR BLD: 5.6 % (ref 4–5.6)
HCT VFR BLD AUTO: 46.5 % (ref 40–54)
HDLC SERPL-MCNC: 83 MG/DL (ref 40–75)
HDLC SERPL: 41.7 % (ref 20–50)
HGB BLD-MCNC: 14.8 G/DL (ref 14–18)
LDLC SERPL CALC-MCNC: 100.8 MG/DL (ref 63–159)
MCH RBC QN AUTO: 30.1 PG (ref 27–31)
MCHC RBC AUTO-ENTMCNC: 31.8 G/DL (ref 32–36)
MCV RBC AUTO: 95 FL (ref 82–98)
NONHDLC SERPL-MCNC: 116 MG/DL
PLATELET # BLD AUTO: 302 K/UL (ref 150–450)
PMV BLD AUTO: 10.3 FL (ref 9.2–12.9)
POTASSIUM SERPL-SCNC: 4.7 MMOL/L (ref 3.5–5.1)
PROT SERPL-MCNC: 6.8 G/DL (ref 6–8.4)
RBC # BLD AUTO: 4.92 M/UL (ref 4.6–6.2)
SODIUM SERPL-SCNC: 138 MMOL/L (ref 136–145)
T4 FREE SERPL-MCNC: 0.76 NG/DL (ref 0.71–1.51)
TRIGL SERPL-MCNC: 76 MG/DL (ref 30–150)
TSH SERPL DL<=0.005 MIU/L-ACNC: 4.47 UIU/ML (ref 0.4–4)
WBC # BLD AUTO: 6.3 K/UL (ref 3.9–12.7)

## 2022-12-21 PROCEDURE — 93010 ELECTROCARDIOGRAM REPORT: CPT | Mod: HCNC,S$GLB,, | Performed by: INTERNAL MEDICINE

## 2022-12-21 PROCEDURE — 84443 ASSAY THYROID STIM HORMONE: CPT | Mod: HCNC | Performed by: INTERNAL MEDICINE

## 2022-12-21 PROCEDURE — 99999 PR PBB SHADOW E&M-EST. PATIENT-LVL III: ICD-10-PCS | Mod: PBBFAC,HCNC,, | Performed by: INTERNAL MEDICINE

## 2022-12-21 PROCEDURE — 71046 X-RAY EXAM CHEST 2 VIEWS: CPT | Mod: 26,HCNC,, | Performed by: RADIOLOGY

## 2022-12-21 PROCEDURE — 3078F DIAST BP <80 MM HG: CPT | Mod: HCNC,CPTII,S$GLB, | Performed by: INTERNAL MEDICINE

## 2022-12-21 PROCEDURE — 3074F SYST BP LT 130 MM HG: CPT | Mod: HCNC,CPTII,S$GLB, | Performed by: INTERNAL MEDICINE

## 2022-12-21 PROCEDURE — 1159F MED LIST DOCD IN RCRD: CPT | Mod: HCNC,CPTII,S$GLB, | Performed by: INTERNAL MEDICINE

## 2022-12-21 PROCEDURE — 4010F PR ACE/ARB THEARPY RXD/TAKEN: ICD-10-PCS | Mod: HCNC,CPTII,S$GLB, | Performed by: INTERNAL MEDICINE

## 2022-12-21 PROCEDURE — 1101F PT FALLS ASSESS-DOCD LE1/YR: CPT | Mod: HCNC,CPTII,S$GLB, | Performed by: INTERNAL MEDICINE

## 2022-12-21 PROCEDURE — 99387 INIT PM E/M NEW PAT 65+ YRS: CPT | Mod: HCNC,S$GLB,, | Performed by: INTERNAL MEDICINE

## 2022-12-21 PROCEDURE — 3008F PR BODY MASS INDEX (BMI) DOCUMENTED: ICD-10-PCS | Mod: HCNC,CPTII,S$GLB, | Performed by: INTERNAL MEDICINE

## 2022-12-21 PROCEDURE — 83036 HEMOGLOBIN GLYCOSYLATED A1C: CPT | Mod: HCNC | Performed by: INTERNAL MEDICINE

## 2022-12-21 PROCEDURE — 3008F BODY MASS INDEX DOCD: CPT | Mod: HCNC,CPTII,S$GLB, | Performed by: INTERNAL MEDICINE

## 2022-12-21 PROCEDURE — 99387 PR PREVENTIVE VISIT,NEW,65 & OVER: ICD-10-PCS | Mod: HCNC,S$GLB,, | Performed by: INTERNAL MEDICINE

## 2022-12-21 PROCEDURE — 93010 EKG 12-LEAD: ICD-10-PCS | Mod: HCNC,S$GLB,, | Performed by: INTERNAL MEDICINE

## 2022-12-21 PROCEDURE — 71046 XR CHEST PA AND LATERAL: ICD-10-PCS | Mod: 26,HCNC,, | Performed by: RADIOLOGY

## 2022-12-21 PROCEDURE — 85027 COMPLETE CBC AUTOMATED: CPT | Mod: HCNC | Performed by: INTERNAL MEDICINE

## 2022-12-21 PROCEDURE — 1101F PR PT FALLS ASSESS DOC 0-1 FALLS W/OUT INJ PAST YR: ICD-10-PCS | Mod: HCNC,CPTII,S$GLB, | Performed by: INTERNAL MEDICINE

## 2022-12-21 PROCEDURE — 93005 EKG 12-LEAD: ICD-10-PCS | Mod: HCNC,S$GLB,, | Performed by: INTERNAL MEDICINE

## 2022-12-21 PROCEDURE — 3078F PR MOST RECENT DIASTOLIC BLOOD PRESSURE < 80 MM HG: ICD-10-PCS | Mod: HCNC,CPTII,S$GLB, | Performed by: INTERNAL MEDICINE

## 2022-12-21 PROCEDURE — 84439 ASSAY OF FREE THYROXINE: CPT | Mod: HCNC | Performed by: INTERNAL MEDICINE

## 2022-12-21 PROCEDURE — 80053 COMPREHEN METABOLIC PANEL: CPT | Mod: HCNC | Performed by: INTERNAL MEDICINE

## 2022-12-21 PROCEDURE — 1159F PR MEDICATION LIST DOCUMENTED IN MEDICAL RECORD: ICD-10-PCS | Mod: HCNC,CPTII,S$GLB, | Performed by: INTERNAL MEDICINE

## 2022-12-21 PROCEDURE — 1125F AMNT PAIN NOTED PAIN PRSNT: CPT | Mod: HCNC,CPTII,S$GLB, | Performed by: INTERNAL MEDICINE

## 2022-12-21 PROCEDURE — 3044F HG A1C LEVEL LT 7.0%: CPT | Mod: HCNC,CPTII,S$GLB, | Performed by: INTERNAL MEDICINE

## 2022-12-21 PROCEDURE — 99999 PR PBB SHADOW E&M-EST. PATIENT-LVL III: CPT | Mod: PBBFAC,HCNC,, | Performed by: INTERNAL MEDICINE

## 2022-12-21 PROCEDURE — 80061 LIPID PANEL: CPT | Mod: HCNC | Performed by: INTERNAL MEDICINE

## 2022-12-21 PROCEDURE — 3288F PR FALLS RISK ASSESSMENT DOCUMENTED: ICD-10-PCS | Mod: HCNC,CPTII,S$GLB, | Performed by: INTERNAL MEDICINE

## 2022-12-21 PROCEDURE — 84153 ASSAY OF PSA TOTAL: CPT | Mod: HCNC | Performed by: INTERNAL MEDICINE

## 2022-12-21 PROCEDURE — 71046 X-RAY EXAM CHEST 2 VIEWS: CPT | Mod: TC,HCNC,FY

## 2022-12-21 PROCEDURE — 4010F ACE/ARB THERAPY RXD/TAKEN: CPT | Mod: HCNC,CPTII,S$GLB, | Performed by: INTERNAL MEDICINE

## 2022-12-21 PROCEDURE — 3288F FALL RISK ASSESSMENT DOCD: CPT | Mod: HCNC,CPTII,S$GLB, | Performed by: INTERNAL MEDICINE

## 2022-12-21 PROCEDURE — 3074F PR MOST RECENT SYSTOLIC BLOOD PRESSURE < 130 MM HG: ICD-10-PCS | Mod: HCNC,CPTII,S$GLB, | Performed by: INTERNAL MEDICINE

## 2022-12-21 PROCEDURE — 3044F PR MOST RECENT HEMOGLOBIN A1C LEVEL <7.0%: ICD-10-PCS | Mod: HCNC,CPTII,S$GLB, | Performed by: INTERNAL MEDICINE

## 2022-12-21 PROCEDURE — 93005 ELECTROCARDIOGRAM TRACING: CPT | Mod: HCNC,S$GLB,, | Performed by: INTERNAL MEDICINE

## 2022-12-21 PROCEDURE — 1125F PR PAIN SEVERITY QUANTIFIED, PAIN PRESENT: ICD-10-PCS | Mod: HCNC,CPTII,S$GLB, | Performed by: INTERNAL MEDICINE

## 2022-12-21 RX ORDER — MELOXICAM 15 MG/1
15 TABLET ORAL DAILY
Qty: 15 TABLET | Refills: 3 | Status: SHIPPED | OUTPATIENT
Start: 2022-12-21 | End: 2023-02-11

## 2022-12-21 NOTE — PROGRESS NOTES
Subjective:       Patient ID: Octavio Constantino is a 72 y.o. male.    Chief Complaint: Annual Exam    HPI  71 yo retired  comes for his periodic health exam. His only complaint is stiffness and pain in his lower back and that his legs fatigue easily.  Not true sciatic like pain. I reviewed the records  and I found a MRI report of a lumbar MRI commenting upon DJD and findings of spinal stenosis. He says the symptoms he had then gradually resolved. The current discomfort has been going on about a year.  No asthma events, he uses his Advair inhaler once a day.  Ex smoker quit in Feb. 1990  Review of Systems   Constitutional: Negative.    HENT: Negative.     Eyes: Negative.    Respiratory: Negative.          Mild intermittent asthma.   No exacerbations this past year. Peak flow:400 l/min   Cardiovascular: Negative.    Gastrointestinal: Negative.    Genitourinary: Negative.    Musculoskeletal: Negative.         Lumbar back pain with weak lower extremities    Will get a MRI of lumbar spine and compare with the study of 2011.   Integumentary:  Negative.   Neurological: Negative.    Psychiatric/Behavioral: Negative.     All other systems reviewed and are negative.      Objective:      Physical Exam  Constitutional:       Appearance: He is well-developed.   HENT:      Head: Normocephalic and atraumatic.      Right Ear: External ear normal.      Left Ear: External ear normal.   Eyes:      Conjunctiva/sclera: Conjunctivae normal.      Pupils: Pupils are equal, round, and reactive to light.   Cardiovascular:      Rate and Rhythm: Normal rate and regular rhythm.      Heart sounds: Normal heart sounds.   Pulmonary:      Effort: Pulmonary effort is normal.      Breath sounds: Normal breath sounds.   Abdominal:      General: Bowel sounds are normal.      Palpations: Abdomen is soft.   Musculoskeletal:         General: Normal range of motion.      Cervical back: Normal range of motion and neck supple.      Comments: Good  reflexes and muscle strength in the quadriceps      Walking gait in normal    Some loss of balance with heel to toe walking  Not bad     Skin:     General: Skin is warm and dry.   Neurological:      Mental Status: He is alert and oriented to person, place, and time.      Deep Tendon Reflexes: Reflexes are normal and symmetric.   Psychiatric:         Behavior: Behavior normal.         Thought Content: Thought content normal.         Judgment: Judgment normal.       Assessment:       Problem List Items Addressed This Visit    None  Visit Diagnoses       Annual physical exam    -  Primary              Plan:         Labs:  TSH is slightly elevated but T-4 is normal at 0.76  All other parameters are normal    Chest x-ray is clear.    EKG Normal with a Right Bundle Branch Block    Will arrange for a MRI of his Lumbar Spine, and appt with Dr. Fernandez. Trial of Mobic 15 mg while waiting for study

## 2022-12-21 NOTE — LETTER
December 21, 2022    Octavio Constantino  [address] [place] [address]             [facility] [address]  [address]  [facility] [address]  Phone: [phone] Dear  Octavio,     Thank you for allowing me to serve you and perform your [facility] exam on 12/21/2022. This letter will serve as a brief summary of the physical findings and laboratory/studies performed and recommendations at this time. Your assessment is essentially normal but your story of weakened lower extremities and low back pain is on concern. I will arrange for a MRI of the lumbar spine, you had one in 2011 and an appt. With Dr. Wan, a back specialist.    Nancy Abreu               If you have any questions or concerns, please don't hesitate to call.    Sincerely,        Raphael Carrington MD

## 2022-12-22 DIAGNOSIS — M54.9 DORSALGIA, UNSPECIFIED: ICD-10-CM

## 2022-12-22 DIAGNOSIS — M48.07 SPINAL STENOSIS, LUMBOSACRAL REGION: Primary | ICD-10-CM

## 2022-12-28 ENCOUNTER — PES CALL (OUTPATIENT)
Dept: ADMINISTRATIVE | Facility: CLINIC | Age: 72
End: 2022-12-28
Payer: MEDICARE

## 2023-01-04 ENCOUNTER — PATIENT MESSAGE (OUTPATIENT)
Dept: PULMONOLOGY | Facility: CLINIC | Age: 73
End: 2023-01-04
Payer: MEDICARE

## 2023-01-05 ENCOUNTER — PATIENT MESSAGE (OUTPATIENT)
Dept: PULMONOLOGY | Facility: CLINIC | Age: 73
End: 2023-01-05
Payer: MEDICARE

## 2023-01-07 ENCOUNTER — HOSPITAL ENCOUNTER (OUTPATIENT)
Dept: RADIOLOGY | Facility: HOSPITAL | Age: 73
Discharge: HOME | End: 2023-01-07
Attending: INTERNAL MEDICINE
Payer: MEDICARE

## 2023-01-07 DIAGNOSIS — M48.07 SPINAL STENOSIS, LUMBOSACRAL REGION: ICD-10-CM

## 2023-01-07 DIAGNOSIS — M54.9 DORSALGIA, UNSPECIFIED: ICD-10-CM

## 2023-01-07 PROCEDURE — 72148 MRI LUMBAR SPINE W/O DYE: CPT | Mod: 26,HCNC,, | Performed by: RADIOLOGY

## 2023-01-07 PROCEDURE — 72148 MRI LUMBAR SPINE W/O DYE: CPT | Mod: TC,HCNC

## 2023-01-07 PROCEDURE — 72148 MRI LUMBAR SPINE WITHOUT CONTRAST: ICD-10-PCS | Mod: 26,HCNC,, | Performed by: RADIOLOGY

## 2023-01-17 ENCOUNTER — PATIENT MESSAGE (OUTPATIENT)
Dept: ADMINISTRATIVE | Facility: OTHER | Age: 73
End: 2023-01-17
Payer: MEDICARE

## 2023-02-07 DIAGNOSIS — Z00.00 ENCOUNTER FOR MEDICARE ANNUAL WELLNESS EXAM: ICD-10-CM

## 2023-02-09 DIAGNOSIS — Z00.00 ENCOUNTER FOR MEDICARE ANNUAL WELLNESS EXAM: ICD-10-CM

## 2023-02-10 ENCOUNTER — OFFICE VISIT (OUTPATIENT)
Dept: ORTHOPEDICS | Facility: CLINIC | Age: 73
End: 2023-02-10
Payer: MEDICARE

## 2023-02-10 ENCOUNTER — HOSPITAL ENCOUNTER (OUTPATIENT)
Dept: RADIOLOGY | Facility: HOSPITAL | Age: 73
Discharge: HOME | End: 2023-02-10
Attending: ORTHOPAEDIC SURGERY
Payer: MEDICARE

## 2023-02-10 ENCOUNTER — PATIENT MESSAGE (OUTPATIENT)
Dept: PAIN MEDICINE | Facility: CLINIC | Age: 73
End: 2023-02-10
Payer: MEDICARE

## 2023-02-10 VITALS — HEIGHT: 72 IN | WEIGHT: 207.25 LBS | BODY MASS INDEX: 28.07 KG/M2

## 2023-02-10 DIAGNOSIS — M48.07 SPINAL STENOSIS, LUMBOSACRAL REGION: ICD-10-CM

## 2023-02-10 DIAGNOSIS — M51.36 DDD (DEGENERATIVE DISC DISEASE), LUMBAR: ICD-10-CM

## 2023-02-10 DIAGNOSIS — M54.9 DORSALGIA, UNSPECIFIED: ICD-10-CM

## 2023-02-10 PROCEDURE — 3008F PR BODY MASS INDEX (BMI) DOCUMENTED: ICD-10-PCS | Mod: HCNC,CPTII,S$GLB, | Performed by: ORTHOPAEDIC SURGERY

## 2023-02-10 PROCEDURE — 99204 PR OFFICE/OUTPT VISIT, NEW, LEVL IV, 45-59 MIN: ICD-10-PCS | Mod: HCNC,S$GLB,, | Performed by: ORTHOPAEDIC SURGERY

## 2023-02-10 PROCEDURE — 72110 XR LUMBAR SPINE AP AND LAT WITH FLEX/EXT: ICD-10-PCS | Mod: 26,HCNC,, | Performed by: RADIOLOGY

## 2023-02-10 PROCEDURE — 99204 OFFICE O/P NEW MOD 45 MIN: CPT | Mod: HCNC,S$GLB,, | Performed by: ORTHOPAEDIC SURGERY

## 2023-02-10 PROCEDURE — 1125F AMNT PAIN NOTED PAIN PRSNT: CPT | Mod: HCNC,CPTII,S$GLB, | Performed by: ORTHOPAEDIC SURGERY

## 2023-02-10 PROCEDURE — 1125F PR PAIN SEVERITY QUANTIFIED, PAIN PRESENT: ICD-10-PCS | Mod: HCNC,CPTII,S$GLB, | Performed by: ORTHOPAEDIC SURGERY

## 2023-02-10 PROCEDURE — 72110 X-RAY EXAM L-2 SPINE 4/>VWS: CPT | Mod: 26,HCNC,, | Performed by: RADIOLOGY

## 2023-02-10 PROCEDURE — 3288F FALL RISK ASSESSMENT DOCD: CPT | Mod: HCNC,CPTII,S$GLB, | Performed by: ORTHOPAEDIC SURGERY

## 2023-02-10 PROCEDURE — 1101F PR PT FALLS ASSESS DOC 0-1 FALLS W/OUT INJ PAST YR: ICD-10-PCS | Mod: HCNC,CPTII,S$GLB, | Performed by: ORTHOPAEDIC SURGERY

## 2023-02-10 PROCEDURE — 3008F BODY MASS INDEX DOCD: CPT | Mod: HCNC,CPTII,S$GLB, | Performed by: ORTHOPAEDIC SURGERY

## 2023-02-10 PROCEDURE — 1101F PT FALLS ASSESS-DOCD LE1/YR: CPT | Mod: HCNC,CPTII,S$GLB, | Performed by: ORTHOPAEDIC SURGERY

## 2023-02-10 PROCEDURE — 99999 PR PBB SHADOW E&M-EST. PATIENT-LVL III: ICD-10-PCS | Mod: PBBFAC,HCNC,, | Performed by: ORTHOPAEDIC SURGERY

## 2023-02-10 PROCEDURE — 99999 PR PBB SHADOW E&M-EST. PATIENT-LVL III: CPT | Mod: PBBFAC,HCNC,, | Performed by: ORTHOPAEDIC SURGERY

## 2023-02-10 PROCEDURE — 3288F PR FALLS RISK ASSESSMENT DOCUMENTED: ICD-10-PCS | Mod: HCNC,CPTII,S$GLB, | Performed by: ORTHOPAEDIC SURGERY

## 2023-02-10 PROCEDURE — 72110 X-RAY EXAM L-2 SPINE 4/>VWS: CPT | Mod: TC,HCNC

## 2023-02-14 ENCOUNTER — TELEPHONE (OUTPATIENT)
Dept: PAIN MEDICINE | Facility: CLINIC | Age: 73
End: 2023-02-14
Payer: MEDICARE

## 2023-02-14 DIAGNOSIS — M54.16 LUMBAR RADICULOPATHY: Primary | ICD-10-CM

## 2023-02-15 NOTE — PROGRESS NOTES
DATE: 2/15/2023  PATIENT: Octavio Constantino    Attending Physician: Casey Fernandez M.D.    CHIEF COMPLAINT:  Left lower extremity radiculopathy    HISTORY:  Octavio Constantino is a 72 y.o. male male who presents for a 2 year history of left lower extremity radiculopathy.  He reports his pain is worst in the morning.  He notes some alterations to his gait and pain with ambulation or prolonged standing.  Thus far he is treated with meloxicam.  He has not had physical therapy or an epidural steroid injection.  He does have a positive shopping cart sign.  The patient reports he had a similar episode about 11 years ago that responded to conservative treatment.     The Patient denies myelopathic symptoms such as handwriting changes or difficulty with buttons/coins/keys. Denies perineal paresthesias, bowel/bladder dysfunction.    PAST MEDICAL/SURGICAL HISTORY:  Past Medical History:   Diagnosis Date    Asthma     Hypertension      Past Surgical History:   Procedure Laterality Date    ADENOIDECTOMY      COLONOSCOPY N/A 5/30/2018    Procedure: COLONOSCOPY;  Surgeon: Srikanth Carver Jr., MD;  Location: [facility];  Service: Endoscopy;  Laterality: N/A;    COLONOSCOPY W/ POLYPECTOMY  05/30/2018    EYE SURGERY      MOUTH SURGERY  02/2018    REFRACTIVE SURGERY Right     TONSILLECTOMY         Current Medications:   Current Outpatient Medications:     fluticasone-salmeterol diskus inhaler 250-50 mcg, INHALE 1 PUFF TWICE DAILY, Disp: 3 each, Rfl: 3    lisinopriL (PRINIVIL,ZESTRIL) 20 MG tablet, TAKE 1 TABLET EVERY DAY, Disp: 90 tablet, Rfl: 1    LUTEIN/ZEAXANTHIN (OCUVITE LUTEIN 25 ORAL), Take 1 tablet by mouth once daily., Disp: , Rfl:     meloxicam (MOBIC) 15 MG tablet, TAKE 1 TABLET(15 MG) BY MOUTH EVERY DAY, Disp: 30 tablet, Rfl: 11    Social History:   Social History     Socioeconomic History    Marital status:    Tobacco Use    Smoking status: Former     Packs/day: 1.00     Years: 25.00     Pack years: 25.00      Types: Cigarettes     Quit date: 1990     Years since quittin.0    Smokeless tobacco: Former   Substance and Sexual Activity    Alcohol use: Yes     Alcohol/week: 17.0 - 24.0 standard drinks     Types: 14 - 21 Cans of beer, 3 Standard drinks or equivalent per week     Comment: everyday    Drug use: No    Sexual activity: Not Currently     Partners: Female        EXAM:  Ht 6' (1.829 m)   Wt 94 kg (207 lb 3.7 oz)   BMI 28.11 kg/m²     PHYSICAL EXAMINATION:    Gait: Normal station and gait, no difficulty with toe or heel walk.   Skin: Dorsal lumbar skin negative for rashes, lesions, hairy patches and surgical scars. There is minimal lumbar tenderness to palpation.  Range of motion: Lumbar range of motion is acceptable.  Spinal Balance: Global saggital and coronal spinal balance acceptable, no significant for scoliosis and kyphosis.  Musculoskeletal: No pain with the range of motion of the bilateral hips. No trochanteric tenderness to palpation.  Vascular: Bilateral lower extremities warm and well perfused, Dorsalis pedis pulses 2+ bilaterally.  Neurological: Normal strength and tone in all major motor groups in the bilateral lower extremities. Normal sensation to light touch in the L2-S1 dermatomes bilaterally.  Deep tendon reflexes symmetric 1+ in the bilateral lower extremities.  Negative Babinski bilaterally. Straight leg raise negative bilaterally.    IMAGING:      Today I personally reviewed AP, Lat and Flex/Ex  upright L-spine that demonstrate severe multilevel lumbar spondylosis.      I was able to review recent MRI of his lumbar spine demonstrates severe central stenosis from L2-3 to L4-5.      Body mass index is 28.11 kg/m².  Hemoglobin A1C   Date Value Ref Range Status   2022 5.6 4.0 - 5.6 % Final     Comment:     ADA Screening Guidelines:  5.7-6.4%  Consistent with prediabetes  >or=6.5%  Consistent with diabetes    High levels of fetal hemoglobin interfere with the HbA1C  assay.  Heterozygous hemoglobin variants (HbS, HgC, etc)do  not significantly interfere with this assay.   However, presence of multiple variants may affect accuracy.     12/08/2021 5.4 4.0 - 5.6 % Final     Comment:     ADA Screening Guidelines:  5.7-6.4%  Consistent with prediabetes  >or=6.5%  Consistent with diabetes    High levels of fetal hemoglobin interfere with the HbA1C  assay. Heterozygous hemoglobin variants (HbS, HgC, etc)do  not significantly interfere with this assay.   However, presence of multiple variants may affect accuracy.     08/12/2019 5.6 4.0 - 5.6 % Final     Comment:     ADA Screening Guidelines:  5.7-6.4%  Consistent with prediabetes  >or=6.5%  Consistent with diabetes  High levels of fetal hemoglobin interfere with the HbA1C  assay. Heterozygous hemoglobin variants (HbS, HgC, etc)do  not significantly interfere with this assay.   However, presence of multiple variants may affect accuracy.         ASSESSMENT/PLAN:    Octavio was seen today for back pain.    Diagnoses and all orders for this visit:    Spinal stenosis, lumbosacral region  -     Ambulatory referral/consult to [facility]  -     Ambulatory referral/consult to Physical/Occupational Therapy; Future  -     Procedure Order to Pain Management; Future    Dorsalgia, unspecified  -     Ambulatory referral/consult to [facility]  -     Ambulatory referral/consult to Physical/Occupational Therapy; Future  -     Procedure Order to Pain Management; Future      No follow-ups on file.    Today we discussed options, I recommended a course of physical therapy, and epidural steroid, and continuing meloxicam.  I will see him back in 3 months.

## 2023-03-02 ENCOUNTER — CLINICAL SUPPORT (OUTPATIENT)
Dept: REHABILITATION | Facility: HOSPITAL | Age: 73
End: 2023-03-02
Payer: MEDICARE

## 2023-03-02 DIAGNOSIS — R52 PAIN AGGRAVATED BY STANDING: ICD-10-CM

## 2023-03-02 DIAGNOSIS — M48.07 SPINAL STENOSIS, LUMBOSACRAL REGION: ICD-10-CM

## 2023-03-02 DIAGNOSIS — M54.9 DORSALGIA, UNSPECIFIED: ICD-10-CM

## 2023-03-02 PROCEDURE — 97110 THERAPEUTIC EXERCISES: CPT | Mod: HCNC,PN | Performed by: PHYSICAL THERAPIST

## 2023-03-02 PROCEDURE — 97161 PT EVAL LOW COMPLEX 20 MIN: CPT | Mod: HCNC,PN | Performed by: PHYSICAL THERAPIST

## 2023-03-02 NOTE — PLAN OF CARE
[facility]  Physical Therapy Initial Evaluation    Date: 3/2/2023   Name: Octavio Constantino  [facility] Number: [id number]    Therapy Diagnosis:   Encounter Diagnoses   Name Primary?    Spinal stenosis, lumbosacral region     Dorsalgia, unspecified     Pain aggravated by standing      Physician: Casey Fernandez MD    Physician Orders: PT Eval and Treat   Medical Diagnosis from Referral:   M48.07 (ICD-10-CM) - Spinal stenosis, lumbosacral region   M54.9 (ICD-10-CM) - Dorsalgia, unspecified     Evaluation Date: 3/2/2023  Authorization Period Expiration: 2/10/24  Plan of Care Expiration: 4/14/23  Progress Note Due: 4/14/23  Visit # / Visits authorized: 1/ 1   FOTO: 1/ 5   PTA: 0/5    Precautions: Standard    Time In: 8:00 am  Time Out: 9:00 am  Total Appointment Time (timed & untimed codes): 56 minutes    SUBJECTIVE   Date of onset: ~1 yrs    History of current condition - Octavio reports: a similar issue about 11 yrs ago that responded well to conservative treatment (no PT or injections). He has not previously tried PT or and ISMAEL. He is scheduled for an ISMAEL on 3/15/23. He's been having pain shooting down his left leg more than right leg. The Patient denies myelopathic symptoms such as handwriting changes or difficulty with buttons/coins/keys. Denies perineal paresthesias, bowel/bladder dysfunction. He does report that he hurt his right knee yrs ago, which has limited his right knee range of motion. He thinks he may occasionally have tingling/numbness in his legs, but can't remember causes and says it's infrequent. He's been referred to [facility] for PT.     Falls: none    Imaging, bone scan films: DJD with bridging osteophytosis.  The disc spaces are significantly narrowed between L2 and L5 vertebral segments.  No fracture, spondylolisthesis or bone destruction identified    MRI:   L1-L2: There is no focal disc herniation. No significant central canal narrowing . No significant neural  foraminal narrowing.     L2-L3: Broad-based bulging of disc material with posterior marginal osteophytic spurring and facet hypertrophy result in mild central canal narrowing.  Moderate RIGHT mild LEFT neural foraminal narrowing.     L3-L4: There is no focal disc herniation.Minimal retrolisthesis of L3 relative to L4, disc space narrowing with marginal osteophytosis, broad-based bulging of disc material and facet hypertrophy result in mild central canal narrowing and encroachment upon bilateral lateral recesses RIGHT worse than LEFT.  Moderate-severe RIGHT and moderate LEFT neural foraminal narrowing.     L4-L5: Broad-based bulging of disc material with marginal osteophytic spurring, eccentric to the LEFT, with moderate-severe hypertrophic changes of facets result in moderate-severe central canal narrowing . Moderate-severe neural foraminal narrowing.     L5-S1: There is no focal disc herniation. No significant central canal narrowing . No significant neural foraminal narrowing.       Prior Therapy: none  Social History: home without stairs  Occupation: retired  Hobbies: walk several times per wk, exercise with 25 lb free weights  Prior Level of Function: no limitations  Current Level of Function: stand 2-3 hrs; unable to go to any [employer] games last yr  Worse Pain with flexion? no  Bowel or bladder change? none      Pain:  Current 0/10, worst 5/10, best 0/10   Location: left back and leg to the knee  Constant or intermittent: intermittent  Description: Aching and Shooting  Aggravating Factors: morning, standing  Easing Factors: exercising    Patients goals: reduce the symptoms to get around better, stand better (go to parades)     Medical History:   Past Medical History:   Diagnosis Date    Asthma     Hypertension        Surgical History:   Octavio Constantino  has a past surgical history that includes Mouth surgery (02/2018); Adenoidectomy; Tonsillectomy; Eye surgery; Refractive surgery (Right); Colonoscopy w/  polypectomy (05/30/2018); and Colonoscopy (N/A, 5/30/2018).    Medications:   Octavio has a current medication list which includes the following prescription(s): fluticasone-salmeterol 250-50 mcg/dose, lisinopril, lutein/zeaxanthin, and meloxicam.    Allergies:   Review of patient's allergies indicates:  No Known Allergies       OBJECTIVE       Palpation: no tender to palpation     Posture:  Bilateral rounded shoulders, inc'd thoracic kyphosis, forward head, flattened lumbar spine, left scapular depression; Bilateral genu varum  Left anterior innominate    Functional Movements:  Sit <> stand: no upper extremity needed  Squat: good squat form; 75% depth      AROM:   Degrees Pain/Dysfunction   Flexion ankles Repeated: NP   Extension 15 Repeated: NP   Right Rotation 75% NP   Left Rotation 75% NP   Right Side Bending 60 deg NP   Left Side Bending 60 deg Mild left quadrant pain     Hip external rotation PROM: right: 55 deg    left: 40 degree   Hip internal rotation PROM:  right: 10 deg    left: 10 degree   Hip flexion PROM: right: 100 degree   Left: 90 degree     Strength:  RLE  LLE    Hip flexion: 5/5 Hip flexion: 5/5   Hip Abduction: 5/5 Hip abduction: 5/5   Hip extension 4+/5 Hip extension 4+/5   Hip ER 5/5 Hip ER 5/5   Hip IR 5/5 Hip IR 5/5   Knee flexion: 5/5 Knee flexion: 5/5   Knee extension: 5/5 Knee extension: 5/5   Ankle Dorsiflexion: 5/5 Ankle Dorsiflexion: 5/5   Ankle Plantarflexion: 5/5 Ankle Plantarflexion: 5/5     Special Tests:   Degrees Pain/Dysfunction   Hamstring 90/90 Right: 35   Left: 57 NP   Ely Test + Bilateral  NP   Obers Test NT NP   SLR Test - NP   Slump Test - NP   Flexion Preference - NP   Extension Preference - NP   Piriformis test + Thightness Bilateral    FABERs + Tightness only   FADIR -  NP   Flick test NT NP   SIJ Compression NT NP   Gaenslens NT NP   Sacral Thrust NT NP   Thigh Thrust NT NP   Sciatic nerve tension test - NP   Femoral nerve tension test NT NP       Sensation: intact  "light touch       CMS Impairment/Limitation/Restriction for FOTO Back Survey    Therapist reviewed FOTO scores for Octavio Constantino on 3/2/2023.   FOTO documents entered into [employer] - see Media section.    Current Limitation Score: 38%  Predicted Limitation Score: 30%       TREATMENT     Total Treatment time (time-based codes) separate from Evaluation: 18 minutes     Octavio received therapeutic exercises to develop strength, endurance, ROM, flexibility, posture, and core stabilization for 10 minutes including:  Home exercise program:  LTR: 10x  Open Books: 10x5"   Piriformis stretch: 3x20"   Hip external rotation stretch: 3x20"    Next:  Supine hip flexor/quad stretch    Octavio received the following manual therapy techniques: Joint mobilizations, Manual traction, Myofacial release, and Soft tissue Mobilization were applied to the: back for 8 minutes:    MET for left anterior innominate followed by alternating isometric hip abd/add  Grade IV lumbar gapping mobs Bilateral     Octavio participated in neuromuscular re-education activities to improve: Balance, Coordination, Proprioception, and Posture for 0 minutes. The following activities were included:  Not today    Next:  Supine pelvic tilts  Seated pelvic tilts  Standing pelvic tilts    Quadruped cat/camel  PATIENT EDUCATION AND HOME EXERCISES     Education provided:   - anatomy  - importance of home exercise program compliance  - attendance policy    Written Home Exercises Provided: yes. Exercises were reviewed and Octavio was able to demonstrate them prior to the end of the session.  Octavio demonstrated good  understanding of the education provided. See EMR under Patient Instructions for exercises provided during therapy sessions.    ASSESSMENT   Octavio is a 72 y.o. male referred to outpatient Physical Therapy with a medical diagnosis of lumbar stenosis. Pt presents with a flexed/rounded posture with hypomobility throughout his spine and tight hips. No " directional preference and reported feeling good with mobility testing. No symptoms present during evaluation today, but complaint of left > right radicular symptoms to knee when standing for a long time. Left anterior innominate present and corrected with MET. While he fits in a functional optimization treatment classification, he would also benefit from mobilization and flexibility based exercises to improve mobility and movement patterns. He would benefit from skilled PT to help him reach his goals.    Pt prognosis is Good.   Pt will benefit from skilled outpatient Physical Therapy to address the deficits stated above and in the chart below, provide pt/family education, and to maximize pt's level of independence.     Plan of care discussed with patient: Yes  Pt's spiritual, cultural and educational needs considered and pt agreeable to plan of care and goals as stated below:     Anticipated Barriers for therapy: chronic nature of symptoms    Medical Necessity is demonstrated by the following  History  Co-morbidities and personal factors that may impact the plan of care Co-morbidities:   HTN    Personal Factors:   no deficits     low   Examination  Body Structures and Functions, activity limitations and participation restrictions that may impact the plan of care Body Regions:   back  lower extremities  trunk    Body Systems:    ROM  strength  balance  gait  transfers  motor control    Participation Restrictions:   Participation in long standing activities    Activity limitations:   Learning and applying knowledge  no deficits    General Tasks and Commands  no deficits    Communication  no deficits    Mobility  lifting and carrying objects  walking    Self care  no deficits    Domestic Life  shopping  cooking  doing house work (cleaning house, washing dishes, laundry)    Interactions/Relationships  no deficits    Life Areas  no deficits    Community and Social Life  community life  recreation and leisure          moderate     Clinical Presentation stable and uncomplicated low   Decision Making/ Complexity Score: low     Goals:  Short Term Goals: 3 weeks:  1. Patient will demonstrate 5 degree improvement in Bilateral hip internal rotation.  2. Patient will demonstrate left hamstring flexibility via 90/90 test < 36 degree for improved standing posture.  3. Patient will report an understanding and compliance with original home exercise program.    Long Term Goals: 6 weeks:  1. Patient will demonstrate proper body mechanics with ADLs like vacuuming and laundry.  2. Patient will report being able to stand without any symptoms down his left leg.  3. Patient will improve FOTO to < 31% to improve ADL performance.  4. Patient will demonstrate improved posture without being flexed without cues.      PLAN   Plan of care Certification: 3/2/2023 to 4/14/23.    Outpatient Physical Therapy 2 times weekly for 6 weeks to include the following interventions: Cervical/Lumbar Traction, Electrical Stimulation TENS, IFC, NMES, Gait Training, Manual Therapy, Moist Heat/ Ice, Neuromuscular Re-ed, Patient Education, Self Care, Therapeutic Activities, Therapeutic Exercise, and dry needling.     Pavel Jane, PT      I CERTIFY THE NEED FOR THESE SERVICES FURNISHED UNDER THIS PLAN OF TREATMENT AND WHILE UNDER MY CARE   Physician's comments:     Physician's Signature: ___________________________________________________

## 2023-03-06 ENCOUNTER — CLINICAL SUPPORT (OUTPATIENT)
Dept: REHABILITATION | Facility: HOSPITAL | Age: 73
End: 2023-03-06
Payer: MEDICARE

## 2023-03-06 DIAGNOSIS — R52 PAIN AGGRAVATED BY STANDING: Primary | ICD-10-CM

## 2023-03-06 PROCEDURE — 97140 MANUAL THERAPY 1/> REGIONS: CPT | Mod: HCNC,PN

## 2023-03-06 PROCEDURE — 97110 THERAPEUTIC EXERCISES: CPT | Mod: HCNC,PN

## 2023-03-06 NOTE — PROGRESS NOTES
"[facility]   Physical Therapy Treatment Note     Name: Octavio Constantino  [facility] Number: [id number]    Therapy Diagnosis:   Encounter Diagnosis   Name Primary?    Pain aggravated by standing Yes     Physician: Casey Fernandez MD    Visit Date: 3/6/2023    Physician Orders: PT Eval and Treat   Medical Diagnosis from Referral:   M48.07 (ICD-10-CM) - Spinal stenosis, lumbosacral region   M54.9 (ICD-10-CM) - Dorsalgia, unspecified      Evaluation Date: 3/2/2023  Authorization Period Expiration: 2/10/24  Plan of Care Expiration: 4/14/23  Progress Note Due: 4/14/23  Visit # / Visits authorized: 2/1   FOTO: 2/5   PTA: 0/5     Precautions: Standard    Time In: 9:05 AM  Time Out: 10:00 AM  Total Billable Time: 55 minutes (3 TE, 1 MT)    SUBJECTIVE     Pt reports: mild left upper leg pain and no real low back pain. Spoke about past right knee exacerbation months ago and lacking motion bending and straightening knee.   He was compliant with home exercise program.  Response to previous treatment: 1st after  Functional change: 1st after    Pain: 3/10  Location: left upper leg (not below knee)    OBJECTIVE     Objective Measures updated at progress report unless specified.   Decreased right knee extension lending to right<left leg length, level ASIS today.    Treatment     Octavio received the treatments listed below:      manual therapy techniques: Joint mobilizations, Manual traction, Myofacial release, Soft tissue Mobilization, and Friction Massage were applied to the: lumbar spine for 15 minutes, including:  MET for left anterior innominate followed by alternating isometric hip abd/add - not today  Grade IV lumbar gapping mobs Bilateral - next  Long axis hip and lumbar distraction    therapeutic exercises to develop strength, endurance, ROM, flexibility, posture, and core stabilization for 40 minutes including:  LTRs: 10x Bilateral   Piriformis stretch: 5x10" Bilateral   Open books: 10x5'' holds " Bilateral   Quad sets: 10x10'' holds, towel roll unde right heel   Straight leg raise: 2x12 right  Sidelying clams: 20x Bilateral, red theraband   PPT: 20x5'' holds    Leg press: 3x10 double leg, 5 plates    Patient Education and Home Exercises     Home Exercises Provided and Patient Education Provided   Education provided:   - anatomy  - importance of home exercise program compliance  - attendance policy    Written Home Exercises Provided: yes. Exercises were reviewed and Octavio was able to demonstrate them prior to the end of the session.  Octavio demonstrated good  understanding of the education provided. See EMR under Patient Instructions for exercises provided during therapy sessions    ASSESSMENT     Octavio is a 72 y.o. male referred to outpatient Physical Therapy with a medical diagnosis of lumbar stenosis. Varus thrust and lacking full right knee extension in stance. Limited Bilateral hip internal rotation under 15 degrees. Provacative motions include transfers to stand and prolonged standing/walking activities. Did well today with minimal pain with transition to stand after mat activities. Progress core and lumbar mobility training.     Octavio Is progressing well towards his goals.   Pt prognosis is Good.     Pt will continue to benefit from skilled outpatient physical therapy to address the deficits listed in the problem list box on initial evaluation, provide pt/family education and to maximize pt's level of independence in the home and community environment.     Pt's spiritual, cultural and educational needs considered and pt agreeable to plan of care and goals.     Anticipated barriers to physical therapy: chronic nature of symptoms    Goals:  Short Term Goals: 3 weeks:  1. Patient will demonstrate 5 degree improvement in Bilateral hip internal rotation.  2. Patient will demonstrate left hamstring flexibility via 90/90 test < 36 degree for improved standing posture.  3. Patient will report an  understanding and compliance with original home exercise program.     Long Term Goals: 6 weeks:  1. Patient will demonstrate proper body mechanics with ADLs like vacuuming and laundry.  2. Patient will report being able to stand without any symptoms down his left leg.  3. Patient will improve FOTO to < 31% to improve ADL performance.  4. Patient will demonstrate improved posture without being flexed without cues.    PLAN     Cont per POC.     Danie Benítez, PT

## 2023-03-09 ENCOUNTER — CLINICAL SUPPORT (OUTPATIENT)
Dept: REHABILITATION | Facility: HOSPITAL | Age: 73
End: 2023-03-09
Payer: MEDICARE

## 2023-03-09 DIAGNOSIS — R52 PAIN AGGRAVATED BY STANDING: Primary | ICD-10-CM

## 2023-03-09 PROCEDURE — 97140 MANUAL THERAPY 1/> REGIONS: CPT | Mod: HCNC,PN

## 2023-03-09 PROCEDURE — 97110 THERAPEUTIC EXERCISES: CPT | Mod: HCNC,PN

## 2023-03-09 NOTE — PROGRESS NOTES
[facility]   Physical Therapy Treatment Note     Name: Octavio Constantino  [facility] Number: [id number]    Therapy Diagnosis:   Encounter Diagnosis   Name Primary?    Pain aggravated by standing Yes     Physician: Casey Fernandez MD    Visit Date: 3/9/2023    Physician Orders: PT Eval and Treat   Medical Diagnosis from Referral:   M48.07 (ICD-10-CM) - Spinal stenosis, lumbosacral region   M54.9 (ICD-10-CM) - Dorsalgia, unspecified      Evaluation Date: 3/2/2023  Authorization Period Expiration: 2/10/24  Plan of Care Expiration: 4/14/23  Progress Note Due: 4/14/23  Visit # / Visits authorized: 3/1   FOTO: 3/5   PTA: 0/5     Precautions: Standard    Time In: 9:05 AM  Time Out: 10:00 AM  Total Billable Time: 30 minutes (1 TE, 1 MT)    SUBJECTIVE     Pt reports: no changes in low back pain and right knee motion. Has scheduled injection for low back in a couple of weeks.   He was compliant with home exercise program.  Response to previous treatment: no change  Functional change: no change    Pain: 3/10  Location: left upper leg (not below knee)    OBJECTIVE     Objective Measures updated at progress report unless specified.   Decreased right knee extension lending to right<left leg length, level ASIS today.    Treatment     Octavio received the treatments listed below:      manual therapy techniques: Joint mobilizations, Manual traction, Myofacial release, Soft tissue Mobilization, and Friction Massage were applied to the: lumbar spine for 15 minutes, including:  MET for left anterior innominate followed by alternating isometric hip abd/add - not today  Grade IV lumbar gapping mobs Bilateral -next  Long axis hip and lumbar distraction    therapeutic exercises to develop strength, endurance, ROM, flexibility, posture, and core stabilization for 40 minutes including:  LTRs: 10x Bilateral   Right hamstirng stretch: 5x30'' right  Double knees to chest: 20x5'' holds  Open books: 10x5'' holds  Bilateral   Quad sets: 10x10'' holds, towel roll under right heel - d/c next  Straight leg raise: 2x12 right (2# weight next)  Sidelying clams: 20x Bilateral, red theraband   PPT: 20x5'' holds    Leg press: 4x10 double leg, 5 plates, sled=5    Patient Education and Home Exercises     Home Exercises Provided and Patient Education Provided   Education provided:   - anatomy  - importance of home exercise program compliance  - attendance policy    Written Home Exercises Provided: yes. Exercises were reviewed and Octavio was able to demonstrate them prior to the end of the session.  Octavio demonstrated good  understanding of the education provided. See EMR under Patient Instructions for exercises provided during therapy sessions    ASSESSMENT     Octavio is a 72 y.o. male referred to outpatient Physical Therapy with a medical diagnosis of lumbar stenosis. Varus thrust and lacking full right knee extension in stance. Limited Bilateral hip internal rotation under 15 degrees. Provacative motions include transfers to stand and prolonged standing/walking activities. Can advance resistance next visit and add in trial heel rocking. Leg length discrepancy present due to decreased right knee extension - equal ASIS today.    Octavio Is progressing well towards his goals.   Pt prognosis is Good.     Pt will continue to benefit from skilled outpatient physical therapy to address the deficits listed in the problem list box on initial evaluation, provide pt/family education and to maximize pt's level of independence in the home and community environment.     Pt's spiritual, cultural and educational needs considered and pt agreeable to plan of care and goals.     Anticipated barriers to physical therapy: chronic nature of symptoms    Goals:  Short Term Goals: 3 weeks:  1. Patient will demonstrate 5 degree improvement in Bilateral hip internal rotation.  2. Patient will demonstrate left hamstring flexibility via 90/90 test < 36 degree  for improved standing posture.  3. Patient will report an understanding and compliance with original home exercise program.     Long Term Goals: 6 weeks:  1. Patient will demonstrate proper body mechanics with ADLs like vacuuming and laundry.  2. Patient will report being able to stand without any symptoms down his left leg.  3. Patient will improve FOTO to < 31% to improve ADL performance.  4. Patient will demonstrate improved posture without being flexed without cues.    PLAN     Cont per POC.     Danie Benítez, PT

## 2023-03-13 ENCOUNTER — CLINICAL SUPPORT (OUTPATIENT)
Dept: REHABILITATION | Facility: HOSPITAL | Age: 73
End: 2023-03-13
Payer: MEDICARE

## 2023-03-13 ENCOUNTER — TELEPHONE (OUTPATIENT)
Dept: PAIN MEDICINE | Facility: HOSPITAL | Age: 73
End: 2023-03-13
Payer: MEDICARE

## 2023-03-13 DIAGNOSIS — R52 PAIN AGGRAVATED BY STANDING: Primary | ICD-10-CM

## 2023-03-13 PROCEDURE — 97110 THERAPEUTIC EXERCISES: CPT | Mod: HCNC,PN

## 2023-03-13 NOTE — TELEPHONE ENCOUNTER
Patient notified of 8:00 am arrival time and the following:    Please remember:  Check in at the registration desk on the first floor of the hospital. [address] [place] [address]  Please DO NOT eat or drink 8 hours prior to your arrival time for the procedure, if diabetic 6 hours prior. If you are taking medications for blood pressure, heart medications, thyroid, cholesterol; you can take them with a small sip of water.  Refrain from drinking alcohol within 24 hours prior to your procedure  You will need someone to drive you home after procedure. You cannot take taxi, Uber, or Lyft.  If you start feeling sick (fever, chills, or coughing) or start on any antibiotics please contact us at [phone] to reschedule.

## 2023-03-13 NOTE — PROGRESS NOTES
[facility]   Physical Therapy Treatment Note     Name: Octavio Constantino  [facility] Number: [id number]    Therapy Diagnosis:   Encounter Diagnosis   Name Primary?    Pain aggravated by standing Yes     Physician: Casey Fernandez MD    Visit Date: 3/13/2023    Physician Orders: PT Eval and Treat   Medical Diagnosis from Referral:   M48.07 (ICD-10-CM) - Spinal stenosis, lumbosacral region   M54.9 (ICD-10-CM) - Dorsalgia, unspecified      Evaluation Date: 3/2/2023  Authorization Period Expiration: 2/10/24  Plan of Care Expiration: 4/14/23  Progress Note Due: 4/14/23  Visit # / Visits authorized: 4/1  FOTO: 4/5 - Next  PTA: 0/5     Precautions: Standard    Time In: 9:05 AM  Time Out: 10:00 AM  Total Billable Time: 30 minutes (2 TE)    SUBJECTIVE     Pt reports: about the same right now but able to move around ok. Did cut the grass last weekend, and did have some back pain. Injection coming up this Wednesday.  He was compliant with home exercise program.  Response to previous treatment: no change  Functional change: no change    Pain: 3/10  Location: left upper leg (not below knee)    OBJECTIVE     Objective Measures updated at progress report unless specified.   Decreased right knee extension lending to right<left leg length, level ASIS today.    Treatment     Octavio received the treatments listed below:      manual therapy techniques: Joint mobilizations, Manual traction, Myofacial release, Soft tissue Mobilization, and Friction Massage were applied to the: lumbar spine for 00 minutes, including:  MET for left anterior innominate followed by alternating isometric hip abd/add - not today  Grade IV lumbar gapping mobs Bilateral -next  Long axis hip and lumbar distraction    therapeutic exercises to develop strength, endurance, ROM, flexibility, posture, and core stabilization for 55 minutes including:  PPT: 20x5'' holds - resume next  Right hamstirng stretch: 5x30'' right  Double knees to  chest: 20x5'' holds  Open books: 10x5'' holds Bilateral   Modified side planks: 2x8 Bilateral , 3'' holds  Bridges: 3x12 double leg   Straight leg raise: 3x10 right 2#  Sidelying clams: 20x5'' holds Bilateral, green theraband     Seated lumbar flexion with ball: 20x5'' holds  Leg press: 3x20 double leg, 5 plates, sled=5    Patient Education and Home Exercises     Home Exercises Provided and Patient Education Provided   Education provided:   - anatomy  - importance of home exercise program compliance  - attendance policy    Written Home Exercises Provided: yes. Exercises were reviewed and Octavio was able to demonstrate them prior to the end of the session.  Octavio demonstrated good  understanding of the education provided. See EMR under Patient Instructions for exercises provided during therapy sessions    ASSESSMENT     Octavio is a 72 y.o. male referred to outpatient Physical Therapy with a medical diagnosis of lumbar stenosis. Varus thrust and lacking full right knee extension in stance. Limited Bilateral hip internal rotation under 15 degrees. Tolerated activities well and can resume manual next visit. Increased flexion based and increased lumbar loading activities.     Octavio Is progressing well towards his goals.   Pt prognosis is Good.     Pt will continue to benefit from skilled outpatient physical therapy to address the deficits listed in the problem list box on initial evaluation, provide pt/family education and to maximize pt's level of independence in the home and community environment.     Pt's spiritual, cultural and educational needs considered and pt agreeable to plan of care and goals.     Anticipated barriers to physical therapy: chronic nature of symptoms    Goals:  Short Term Goals: 3 weeks:  1. Patient will demonstrate 5 degree improvement in Bilateral hip internal rotation.  2. Patient will demonstrate left hamstring flexibility via 90/90 test < 36 degree for imp roved standing posture.  3.  Patient will report an understanding and compliance with original home exercise program.     Long Term Goals: 6 weeks:  1. Patient will demonstrate proper body mechanics with ADLs like vacuuming and laundry.  2. Patient will report being able to stand without any symptoms down his left leg.  3. Patient will improve FOTO to < 31% to improve ADL performance.  4. Patient will demonstrate improved posture without being flexed without cues.    PLAN     Cont per POC.     Danie Benítez, PT

## 2023-03-15 ENCOUNTER — HOSPITAL ENCOUNTER (OUTPATIENT)
Facility: HOSPITAL | Age: 73
Discharge: HOME | End: 2023-03-15
Attending: STUDENT IN AN ORGANIZED HEALTH CARE EDUCATION/TRAINING PROGRAM | Admitting: ANESTHESIOLOGY
Payer: MEDICARE

## 2023-03-15 VITALS
BODY MASS INDEX: 28.04 KG/M2 | TEMPERATURE: 98 F | WEIGHT: 207 LBS | HEIGHT: 72 IN | OXYGEN SATURATION: 97 % | HEART RATE: 72 BPM | SYSTOLIC BLOOD PRESSURE: 129 MMHG | DIASTOLIC BLOOD PRESSURE: 70 MMHG | RESPIRATION RATE: 17 BRPM

## 2023-03-15 DIAGNOSIS — G89.29 CHRONIC PAIN: ICD-10-CM

## 2023-03-15 DIAGNOSIS — M54.16 LUMBAR RADICULOPATHY: Primary | ICD-10-CM

## 2023-03-15 PROCEDURE — 25000003 PHARM REV CODE 250: Mod: HCNC | Performed by: STUDENT IN AN ORGANIZED HEALTH CARE EDUCATION/TRAINING PROGRAM

## 2023-03-15 PROCEDURE — 25500020 PHARM REV CODE 255: Mod: HCNC | Performed by: STUDENT IN AN ORGANIZED HEALTH CARE EDUCATION/TRAINING PROGRAM

## 2023-03-15 PROCEDURE — 63600175 PHARM REV CODE 636 W HCPCS: Mod: HCNC | Performed by: STUDENT IN AN ORGANIZED HEALTH CARE EDUCATION/TRAINING PROGRAM

## 2023-03-15 PROCEDURE — 64483 NJX AA&/STRD TFRM EPI L/S 1: CPT | Mod: HCNC,RT | Performed by: STUDENT IN AN ORGANIZED HEALTH CARE EDUCATION/TRAINING PROGRAM

## 2023-03-15 PROCEDURE — 64483 PR EPIDURAL INJ, ANES/STEROID, TRANSFORAMINAL, LUMB/SACR, SNGL LEVL: ICD-10-PCS | Mod: 50,HCNC,, | Performed by: STUDENT IN AN ORGANIZED HEALTH CARE EDUCATION/TRAINING PROGRAM

## 2023-03-15 PROCEDURE — 64483 NJX AA&/STRD TFRM EPI L/S 1: CPT | Mod: 50,HCNC,, | Performed by: STUDENT IN AN ORGANIZED HEALTH CARE EDUCATION/TRAINING PROGRAM

## 2023-03-15 RX ORDER — LIDOCAINE HYDROCHLORIDE 10 MG/ML
INJECTION, SOLUTION EPIDURAL; INFILTRATION; INTRACAUDAL; PERINEURAL
Status: DISCONTINUED | OUTPATIENT
Start: 2023-03-15 | End: 2023-03-15 | Stop reason: HOSPADM

## 2023-03-15 RX ORDER — FENTANYL CITRATE 50 UG/ML
INJECTION, SOLUTION INTRAMUSCULAR; INTRAVENOUS
Status: DISCONTINUED | OUTPATIENT
Start: 2023-03-15 | End: 2023-03-15 | Stop reason: HOSPADM

## 2023-03-15 RX ORDER — DEXAMETHASONE SODIUM PHOSPHATE 100 MG/10ML
INJECTION INTRAMUSCULAR; INTRAVENOUS
Status: DISCONTINUED | OUTPATIENT
Start: 2023-03-15 | End: 2023-03-15 | Stop reason: HOSPADM

## 2023-03-15 RX ORDER — MIDAZOLAM HYDROCHLORIDE 1 MG/ML
INJECTION, SOLUTION INTRAMUSCULAR; INTRAVENOUS
Status: DISCONTINUED | OUTPATIENT
Start: 2023-03-15 | End: 2023-03-15 | Stop reason: HOSPADM

## 2023-03-15 RX ORDER — SODIUM CHLORIDE 9 MG/ML
500 INJECTION, SOLUTION INTRAVENOUS CONTINUOUS
Status: DISCONTINUED | OUTPATIENT
Start: 2023-03-15 | End: 2023-03-15 | Stop reason: HOSPADM

## 2023-03-15 RX ORDER — LIDOCAINE HYDROCHLORIDE 20 MG/ML
INJECTION, SOLUTION EPIDURAL; INFILTRATION; INTRACAUDAL; PERINEURAL
Status: DISCONTINUED | OUTPATIENT
Start: 2023-03-15 | End: 2023-03-15 | Stop reason: HOSPADM

## 2023-03-15 NOTE — OP NOTE
Lumbar Transforaminal Epidural Steroid Injection under Fluoroscopic Guidance    The procedure, risks, benefits, and options were discussed with the patient. There are no contraindications to the procedure. The patent expressed understanding and agreed to the procedure. Informed written consent was obtained prior to the start of the procedure and can be found in the patient's chart.    PATIENT NAME: Octavio Constantino   MRN: [id number]     DATE OF PROCEDURE: 03/15/2023    PROCEDURE:  Bilateral  L2/3 Lumbar Transforaminal Epidural Steroid Injection under Fluoroscopic Guidance    PRE-OP DIAGNOSIS: Lumbar radiculopathy [M54.16] Lumbar radiculopathy [M54.16]    POST-OP DIAGNOSIS: Same    PHYSICIAN: Brianda Reilly DO    ASSISTANTS: None    MEDICATIONS INJECTED: Preservative-free Decadron 10mg with 5cc of Lidocaine 1% MPF     LOCAL ANESTHETIC INJECTED: Xylocaine 2%     SEDATION: Versed 2mg and Fentanyl 50mcg                                                                                                                                                                                     Conscious sedation ordered by M.D. Patient re-evaluation prior to administration of conscious sedation. No changes noted in patient's status from initial evaluation. The patient's vital signs were monitored by RN and patient remained hemodynamically stable throughout the procedure.    Event Time In   Sedation Start 0847   Sedation End 0857       ESTIMATED BLOOD LOSS: None    COMPLICATIONS: None    TECHNIQUE: Time-out was performed to identify the patient and procedure to be performed. With the patient laying in a prone position, the surgical area was prepped and draped in the usual sterile fashion using ChloraPrep and a fenestrated drape.The levels were determined under fluoroscopy guidance. Skin anesthesia was achieved by injecting Lidocaine 2% over the injection sites. The transforaminal spaces were then approached with a 22 gauge, 3.5 inch  spinal quinke needle that was introduced under fluoroscopic guidance in the AP and Lateral views. Once the needle tip was in the area of the transforaminal space, and there was no blood, CSF or paraesthesias, contrast dye Omnipaque (240mg/mL) was injected to confirm placement and there was no vascular runoff. Fluoroscopic imaging in the AP and lateral views revealed a clear outline of the spinal nerve with proximal spread of agent through the neural foramen into the epidural space. 3 mL of the medication mixture listed above was injected slowly at each site. Displacement of the radio opaque contrast after injection of the medication confirmed that the medication went into the area of the transforaminal spaces. The needles were removed and bleeding was nil. A sterile dressing was applied. No specimens collected. The patient tolerated the procedure well.     The patient was monitored after the procedure in the recovery area. They were given post-procedure and discharge instructions to follow at home. The patient was discharged in a stable condition.    Brianda Reilly DO

## 2023-03-15 NOTE — DISCHARGE INSTRUCTIONS
Home Care Instructions Pain Management:    1.  DIET:    You may resume your normal diet today.    2.  BATHING:    You may shower with luke warm water.    3.  DRESSING:    You may remove your bandage today.    4.  ACTIVITY LEVEL:      You may resume your normal activities 24 hours after your procedure.    5.  MEDICATIONS:    You may resume your normal medications today.    6.  SPECIAL INSTRUCTIONS:    No heat to the injection site for 24 hours including bath or shower, heating pad, moist heat or hot tubs.    Use an ice pack to the injection site for any pain or discomfort.  Apply ice packs for 20 minute intervals as needed.    If you have received any sedatives by mouth today, you can not drive for 12 hours.    If you have received sedation through an IV, you can not drive for 24 hours.    PLEASE CALL YOUR DOCTOR FOR THE FOLLOWIN.  Redness or swelling around the injection site.  2.  Fever of 101 degrees.  3.  Drainage (pus) from the injection site.  4.  For any continuous bleeding (some dried blood over the incision is normal.)    FOR EMERGENCIES:    If any unusual problems or difficulties occur during clinic hours, call [phone] or dial [phone].    Follow up with with your physician in 2-3 weeks.

## 2023-03-15 NOTE — H&P
HPI  Patient presenting for Procedure(s) (LRB):  Injection,steroid,epidural,transforaminal bilateral L2/3 (Bilateral)     Patient on Anti-coagulation No    No health changes since previous encounter    Past Medical History:   Diagnosis Date    Asthma     Hypertension      Past Surgical History:   Procedure Laterality Date    ADENOIDECTOMY      COLONOSCOPY N/A 5/30/2018    Procedure: COLONOSCOPY;  Surgeon: Srikanth Carver Jr., MD;  Location: [facility];  Service: Endoscopy;  Laterality: N/A;    COLONOSCOPY W/ POLYPECTOMY  05/30/2018    EYE SURGERY      MOUTH SURGERY  02/2018    REFRACTIVE SURGERY Right     TONSILLECTOMY       Review of patient's allergies indicates:  No Known Allergies   Current Facility-Administered Medications   Medication    0.9%  NaCl infusion    dexAMETHasone injection    iohexoL (OMNIPAQUE 300) injection    LIDOcaine (PF) 10 mg/ml (1%) injection    LIDOcaine (PF) 20 mg/mL (2%) injection       PMHx, PSHx, Allergies, Medications reviewed in epic    ROS negative except pain complaints in HPI    OBJECTIVE:    BP (!) 152/78 (BP Location: Right arm, Patient Position: Sitting)   Pulse 72   Temp 98.3 °F (36.8 °C) (Temporal)   Resp 16   Ht 6' (1.829 m)   Wt 93.9 kg (207 lb)   SpO2 96%   BMI 28.07 kg/m²     PHYSICAL EXAMINATION:    GENERAL: Well appearing, in no acute distress, alert and oriented x3.  PSYCH:  Mood and affect appropriate.  SKIN: Skin color, texture, turgor normal, no rashes or lesions which will impact the procedure.  CV: RRR with palpation of the radial artery.  PULM: No evidence of respiratory difficulty, symmetric chest rise. Clear to auscultation.  NEURO: Cranial nerves grossly intact.    Plan:    Proceed with procedure as planned Procedure(s) (LRB):  Injection,steroid,epidural,transforaminal bilateral L2/3 (Bilateral)    Brianda Reilly  03/15/2023

## 2023-03-15 NOTE — DISCHARGE SUMMARY
Discharge Note  Short Stay      SUMMARY     Admit Date: 3/15/2023    Attending Physician: Brianda Reilly      Discharge Physician: Brianda Reilly      Discharge Date: 3/15/2023 8:59 AM    Procedure(s) (LRB):  Injection,steroid,epidural,transforaminal bilateral L2/3 (Bilateral)    Final Diagnosis: Lumbar radiculopathy [M54.16]    Disposition: Home or self care    Patient Instructions:   Current Discharge Medication List        CONTINUE these medications which have NOT CHANGED    Details   fluticasone-salmeterol diskus inhaler 250-50 mcg INHALE 1 PUFF TWICE DAILY  Qty: 3 each, Refills: 3    Associated Diagnoses: Asthma, mild intermittent, well-controlled      lisinopriL (PRINIVIL,ZESTRIL) 20 MG tablet TAKE 1 TABLET EVERY DAY  Qty: 90 tablet, Refills: 3    Associated Diagnoses: Essential hypertension      LUTEIN/ZEAXANTHIN (OCUVITE LUTEIN 25 ORAL) Take 1 tablet by mouth once daily.      meloxicam (MOBIC) 15 MG tablet TAKE 1 TABLET(15 MG) BY MOUTH EVERY DAY  Qty: 30 tablet, Refills: 11    Comments: ZERO refills remain on this prescription. Your patient is requesting advance approval of refills for this medication to PREVENT ANY MISSED DOSES  Associated Diagnoses: Spinal stenosis, lumbosacral region                 Discharge Diagnosis: Lumbar radiculopathy [M54.16]  Condition on Discharge: Stable with no complications to procedure   Diet on Discharge: Same as before.  Activity: as per instruction sheet.  Discharge to: Home with a responsible adult.  Follow up: 2-4 weeks       Please call my office or pager at [phone] if experienced any weakness or loss of sensation, fever > 101.5, pain uncontrolled with oral medications, persistent nausea/vomiting/or diarrhea, redness or drainage from the incisions, or any other worrisome concerns. If physician on call was not reached or could not communicate with our office for any reason please go to the nearest emergency department

## 2023-03-16 ENCOUNTER — CLINICAL SUPPORT (OUTPATIENT)
Dept: REHABILITATION | Facility: HOSPITAL | Age: 73
End: 2023-03-16
Payer: MEDICARE

## 2023-03-16 DIAGNOSIS — R52 PAIN AGGRAVATED BY STANDING: Primary | ICD-10-CM

## 2023-03-16 PROCEDURE — 97140 MANUAL THERAPY 1/> REGIONS: CPT | Mod: HCNC,PN | Performed by: PHYSICAL THERAPIST

## 2023-03-16 PROCEDURE — 97112 NEUROMUSCULAR REEDUCATION: CPT | Mod: HCNC,PN | Performed by: PHYSICAL THERAPIST

## 2023-03-16 PROCEDURE — 97110 THERAPEUTIC EXERCISES: CPT | Mod: HCNC,PN | Performed by: PHYSICAL THERAPIST

## 2023-03-16 NOTE — PROGRESS NOTES
[facility]   Physical Therapy Treatment Note     Name: Octavio Constantino  [facility] Number: [id number]    Therapy Diagnosis:   Encounter Diagnosis   Name Primary?    Pain aggravated by standing Yes     Physician: Casey Fernandez MD    Visit Date: 3/16/2023    Physician Orders: PT Eval and Treat   Medical Diagnosis from Referral:   M48.07 (ICD-10-CM) - Spinal stenosis, lumbosacral region   M54.9 (ICD-10-CM) - Dorsalgia, unspecified      Evaluation Date: 3/2/2023  Authorization Period Expiration: 2/10/24  Plan of Care Expiration: 4/14/23  Progress Note Due: 4/14/23  Visit # / Visits authorized: 4/20 (+1)  FOTO: 5/5 - Next  PTA: 0/5     Precautions: Standard    Time In: 8:00 AM  Time Out: 8:54 AM  Total Billable Time: 54 minutes (2 TE, NMR, MT)    SUBJECTIVE     Pt reports: he had an injection yesterday and is already feeling better.   He was compliant with home exercise program.  Response to previous treatment: no change  Functional change: no change    Pain: 1/10  Location: left upper leg (not below knee)    OBJECTIVE     Objective Measures updated at progress report unless specified.   Decreased right knee extension lending to right<left leg length, level ASIS today.    3/16/23  Hamstring 90/90: 25 degree each  Hip internal rotation PROM: left 27 degree, right 30 degree     Treatment     Octavio received the treatments listed below:      manual therapy techniques: Joint mobilizations, Manual traction, Myofacial release, Soft tissue Mobilization, and Friction Massage were applied to the: lumbar spine for 8 minutes, including:  MET for left anterior innominate followed by alternating isometric hip abd/add    Not today  Grade IV lumbar gapping mobs Bilateral -next  Long axis hip and lumbar distraction    therapeutic exercises to develop strength, endurance, ROM, flexibility, posture, and core stabilization for 36 minutes including:    Double knees to chest: 5x10'' holds  Open books: 10x5''  "holds Bilateral   Straight leg raise: 3x10 2# each  Sidelying clams: 20x5'' holds Bilateral, green theraband       Not today  Seated lumbar flexion with ball: 20x5'' holds  Leg press: 3x20 double leg, 5 plates, sled=5  Modified side planks: 2x8 Bilateral , 3'' holds  Bridges: 3x12 double leg     neuromuscular re-education activities to improve: Coordination, Proprioception, and Posture for 10 minutes. The following activities were included:    Hooklying anterior/posterior pelvic tilts: 20x3" holds  Seated anterior/posterior pelvic tilts: 20x3" holds  Standing anterior/posterior pelvic tilts: 20x3" holds    Patient Education and Home Exercises     Home Exercises Provided and Patient Education Provided   Education provided:   - anatomy  - importance of home exercise program compliance  - attendance policy    Written Home Exercises Provided: yes. Exercises were reviewed and Octavio was able to demonstrate them prior to the end of the session.  Octavio demonstrated good  understanding of the education provided. See EMR under Patient Instructions for exercises provided during therapy sessions    ASSESSMENT     Octavio is a 72 y.o. male referred to outpatient Physical Therapy with a medical diagnosis of lumbar stenosis. Improved Bilateral hamstring flexibility and hip internal rotation PROM from eval. Pain significantly improved since injection yesterday. Adjusted session to low stress exercises due to recent injection. Left anterior innominate still present. Updated home exercise program to include MET.     Octavio Is progressing well towards his goals.   Pt prognosis is Good.     Pt will continue to benefit from skilled outpatient physical therapy to address the deficits listed in the problem list box on initial evaluation, provide pt/family education and to maximize pt's level of independence in the home and community environment.     Pt's spiritual, cultural and educational needs considered and pt agreeable to plan of " care and goals.     Anticipated barriers to physical therapy: chronic nature of symptoms    Goals:  Short Term Goals: 3 weeks:  1. Patient will demonstrate 5 degree improvement in Bilateral hip internal rotation. MET  2. Patient will demonstrate left hamstring flexibility via 90/90 test < 36 degree for imp roved standing posture. MET  3. Patient will report an understanding and compliance with original home exercise program. MET     Long Term Goals: 6 weeks:  1. Patient will demonstrate proper body mechanics with ADLs like vacuuming and laundry. PROGRESSING; NOT MET  2. Patient will report being able to stand without any symptoms down his left leg. PROGRESSING; NOT MET  3. Patient will improve FOTO to < 31% to improve ADL performance. PROGRESSING; NOT MET  4. Patient will demonstrate improved posture without being flexed without cues. PROGRESSING; NOT MET    PLAN     Cont per POC.     Pavel Jane, PT

## 2023-03-21 ENCOUNTER — CLINICAL SUPPORT (OUTPATIENT)
Dept: REHABILITATION | Facility: HOSPITAL | Age: 73
End: 2023-03-21
Payer: MEDICARE

## 2023-03-21 DIAGNOSIS — R52 PAIN AGGRAVATED BY STANDING: Primary | ICD-10-CM

## 2023-03-21 PROCEDURE — 97110 THERAPEUTIC EXERCISES: CPT | Mod: HCNC,PN

## 2023-03-21 PROCEDURE — 97112 NEUROMUSCULAR REEDUCATION: CPT | Mod: HCNC,PN

## 2023-03-21 NOTE — PROGRESS NOTES
[facility]   Physical Therapy Treatment Note     Name: Octavio Constantino  [facility] Number: [id number]    Therapy Diagnosis:   Encounter Diagnosis   Name Primary?    Pain aggravated by standing Yes     Physician: Casey Fernandez MD    Visit Date: 3/21/2023    Physician Orders: PT Eval and Treat   Medical Diagnosis from Referral:   M48.07 (ICD-10-CM) - Spinal stenosis, lumbosacral region   M54.9 (ICD-10-CM) - Dorsalgia, unspecified      Evaluation Date: 3/2/2023  Authorization Period Expiration: 2/10/24  Plan of Care Expiration: 4/14/23  Progress Note Due: 4/14/23  Visit # / Visits authorized: 5/20 (+1)  FOTO: 6/5 - Next  PTA: 0/5     Precautions: Standard    Time In: 7:05 AM  Time Out: 8:00 AM  Total Billable Time: 55 minutes (1 TE, 1 NMR)    SUBJECTIVE     Pt reports: stood all day Saturday putting together a trampoline, did well, but was sore all day Sunday. Is waking up easier with less stiffness, but cannot tell how much the injection overall is helping him.  He was compliant with home exercise program.  Response to previous treatment: no change  Functional change: no change    Pain: 0/10  Location: left upper leg (not below knee)    OBJECTIVE     Objective Measures updated at progress report unless specified.   Decreased right knee extension lending to right<left leg length, level ASIS today.    3/16/23  Hamstring 90/90: 25 degree each  Hip internal rotation PROM: left 27 degree, right 30 degree     Treatment     Octavio received the treatments listed below:      manual therapy techniques: Joint mobilizations, Manual traction, Myofacial release, Soft tissue Mobilization, and Friction Massage were applied to the: lumbar spine for 00 minutes, including:  MET for left anterior innominate followed by alternating isometric hip abd/add    Not today  Grade IV lumbar gapping mobs Bilateral -next  Long axis hip and lumbar distraction    therapeutic exercises to develop strength, endurance,  "ROM, flexibility, posture, and core stabilization for 45 minutes including:    Double knees to chest: 30x10'' holds  Hooklying oblique abdominal isometrics: 15x5'' holds Bilateral   Open books: 10x5'' holds Bilateral  Straight leg raise: 2x15, 2# each  Sidelying clams: 20x5'' holds Bilateral, green theraband    Leg press: 3x20 double leg, 5 plates, sled=5    Not today  Bridges: 3x12 double leg     neuromuscular re-education activities to improve: Coordination, Proprioception, and Posture for 10 minutes. The following activities were included:    Hooklying anterior/posterior pelvic tilts: 20x3" holds  Hooklying double leg lowering: 15x   Seated anterior/posterior pelvic tilts: 20x3" holds  Standing anterior/posterior pelvic tilts: 20x3" holds  Hip hinging: next  Palloff press: 20x Bilateral, looped green band    Patient Education and Home Exercises     Home Exercises Provided and Patient Education Provided   Education provided:   - anatomy  - importance of home exercise program compliance  - attendance policy    Written Home Exercises Provided: yes. Exercises were reviewed and Octavio was able to demonstrate them prior to the end of the session.  Octavio demonstrated good  understanding of the education provided. See EMR under Patient Instructions for exercises provided during therapy sessions    ASSESSMENT     Octavio is a 72 y.o. male referred to outpatient Physical Therapy with a medical diagnosis of lumbar stenosis. Improved symptoms after recent injection, did have rebound pain the next day after a full day of activities last weekend, and mild morning stiffness so far. Pain well controlled, and goal is to decreased stiffness and thigh pain more. Minimal low back and posterior thigh discomfort with standing palloff press today.    Octavio Is progressing well towards his goals.   Pt prognosis is Good.     Pt will continue to benefit from skilled outpatient physical therapy to address the deficits listed in the " problem list box on initial evaluation, provide pt/family education and to maximize pt's level of independence in the home and community environment.     Pt's spiritual, cultural and educational needs considered and pt agreeable to plan of care and goals.     Anticipated barriers to physical therapy: chronic nature of symptoms    Goals:  Short Term Goals: 3 weeks:  1. Patient will demonstrate 5 degree improvement in Bilateral hip internal rotation. MET  2. Patient will demonstrate left hamstring flexibility via 90/90 test < 36 degree for imp roved standing posture. MET  3. Patient will report an understanding and compliance with original home exercise program. MET     Long Term Goals: 6 weeks:  1. Patient will demonstrate proper body mechanics with ADLs like vacuuming and laundry. PROGRESSING; NOT MET  2. Patient will report being able to stand without any symptoms down his left leg. PROGRESSING; NOT MET  3. Patient will improve FOTO to < 31% to improve ADL performance. PROGRESSING; NOT MET  4. Patient will demonstrate improved posture without being flexed without cues. PROGRESSING; NOT MET    PLAN     Cont per POC.     Danie Benítez, PT

## 2023-03-23 ENCOUNTER — CLINICAL SUPPORT (OUTPATIENT)
Dept: REHABILITATION | Facility: HOSPITAL | Age: 73
End: 2023-03-23
Payer: MEDICARE

## 2023-03-23 DIAGNOSIS — R52 PAIN AGGRAVATED BY STANDING: Primary | ICD-10-CM

## 2023-03-23 PROCEDURE — 97110 THERAPEUTIC EXERCISES: CPT | Mod: HCNC,PN,CQ

## 2023-03-23 PROCEDURE — 97112 NEUROMUSCULAR REEDUCATION: CPT | Mod: HCNC,PN,CQ

## 2023-03-23 NOTE — PROGRESS NOTES
[facility]   Physical Therapy Treatment Note     Name: Octavio Constantino  [facility] Number: [id number]    Therapy Diagnosis:   Encounter Diagnosis   Name Primary?    Pain aggravated by standing Yes       Physician: Casey Fernandez MD    Visit Date: 3/23/2023    Physician Orders: PT Eval and Treat   Medical Diagnosis from Referral:   M48.07 (ICD-10-CM) - Spinal stenosis, lumbosacral region   M54.9 (ICD-10-CM) - Dorsalgia, unspecified      Evaluation Date: 3/2/2023  Authorization Period Expiration: 2/10/24  Plan of Care Expiration: 4/14/23  Progress Note Due: 4/14/23  Visit # / Visits authorized: 5/20 (+1)  FOTO: 7/5 - Next  PTA: 1/5     Precautions: Standard    Time In: 11:03 AM  Time Out: 12:00 AM  Total Billable Time: 55 minutes (1 TE, 1 NMR)    SUBJECTIVE     Pt reports: Patient reports some sciatic discomfort this morning. Patient denies back pain, report 3/10 pain from buttock to knee   He was compliant with home exercise program.  Response to previous treatment: no change  Functional change: no change    Pain: 0/10  Location: left upper leg (not below knee)    OBJECTIVE     Objective Measures updated at progress report unless specified.   Decreased right knee extension lending to right<left leg length, level ASIS today.    3/16/23  Hamstring 90/90: 25 degree each  Hip internal rotation PROM: left 27 degree, right 30 degree     Treatment     Octavio received the treatments listed below:      manual therapy techniques: Joint mobilizations, Manual traction, Myofacial release, Soft tissue Mobilization, and Friction Massage were applied to the: lumbar spine for 00 minutes, including:  MET for left anterior innominate followed by alternating isometric hip abd/add    Not today  Grade IV lumbar gapping mobs Bilateral -next  Long axis hip and lumbar distraction    therapeutic exercises to develop strength, endurance, ROM, flexibility, posture, and core stabilization for 40 minutes  "including:    Double knees to chest: 30x10'' holds  Hooklying oblique abdominal isometrics: 15x5'' holds Bilateral   Open books: 10x5'' holds Bilateral  Straight leg raise: 2x15, 2# each  Sidelying clams: 20x5'' holds Bilateral, green theraband    Leg press: 3x20 double leg, 5 plates, sled=5    Not today  Bridges: 3x12 double leg     neuromuscular re-education activities to improve: Coordination, Proprioception, and Posture for 15 minutes. The following activities were included:    Hooklying anterior/posterior pelvic tilts: 20x3" holds  Hooklying double leg float, single leg tap down:  20x   Seated anterior/posterior pelvic tilts: 20x3" holds on SWB  Standing anterior/posterior pelvic tilts: 20x3" holds  Hip hinging: next  Palloff press: 30x Bilateral, looped green band    Patient Education and Home Exercises     Home Exercises Provided and Patient Education Provided   Education provided:   - anatomy  - importance of home exercise program compliance  - attendance policy    Written Home Exercises Provided: yes. Exercises were reviewed and Octavio was able to demonstrate them prior to the end of the session.  Octavio demonstrated good  understanding of the education provided. See EMR under Patient Instructions for exercises provided during therapy sessions    ASSESSMENT     Octavio is a 72 y.o. male referred to outpatient Physical Therapy with a medical diagnosis of lumbar stenosis. Improved symptoms after recent injection, did have rebound pain the next day after a full day of activities last weekend, and mild morning stiffness so far. Modified seated pelvic tilting to be performed on SWB as patient unable to perform on edge of mat, patient with improved performance with biofeedback of SWB. Progressed Pallof press reps to improve core strength.     Octavio Is progressing well towards his goals.   Pt prognosis is Good.     Pt will continue to benefit from skilled outpatient physical therapy to address the deficits " listed in the problem list box on initial evaluation, provide pt/family education and to maximize pt's level of independence in the home and community environment.     Pt's spiritual, cultural and educational needs considered and pt agreeable to plan of care and goals.     Anticipated barriers to physical therapy: chronic nature of symptoms    Goals:  Short Term Goals: 3 weeks:  1. Patient will demonstrate 5 degree improvement in Bilateral hip internal rotation. MET  2. Patient will demonstrate left hamstring flexibility via 90/90 test < 36 degree for imp roved standing posture. MET  3. Patient will report an understanding and compliance with original home exercise program. MET     Long Term Goals: 6 weeks:  1. Patient will demonstrate proper body mechanics with ADLs like vacuuming and laundry. PROGRESSING; NOT MET  2. Patient will report being able to stand without any symptoms down his left leg. PROGRESSING; NOT MET  3. Patient will improve FOTO to < 31% to improve ADL performance. PROGRESSING; NOT MET  4. Patient will demonstrate improved posture without being flexed without cues. PROGRESSING; NOT MET    PLAN     Cont per POC.     Home Song, PTA

## 2023-03-29 ENCOUNTER — CLINICAL SUPPORT (OUTPATIENT)
Dept: REHABILITATION | Facility: HOSPITAL | Age: 73
End: 2023-03-29
Payer: MEDICARE

## 2023-03-29 DIAGNOSIS — R52 PAIN AGGRAVATED BY STANDING: Primary | ICD-10-CM

## 2023-03-29 PROCEDURE — 97110 THERAPEUTIC EXERCISES: CPT | Mod: HCNC,PN | Performed by: PHYSICAL THERAPIST

## 2023-03-29 NOTE — PROGRESS NOTES
"[facility]   Physical Therapy Treatment Note     Name: Octavio Constantino  [facility] Number: [id number]    Therapy Diagnosis:   Encounter Diagnosis   Name Primary?    Pain aggravated by standing Yes       Physician: Casey Fernandez MD    Visit Date: 3/29/2023    Physician Orders: PT Eval and Treat   Medical Diagnosis from Referral:   M48.07 (ICD-10-CM) - Spinal stenosis, lumbosacral region   M54.9 (ICD-10-CM) - Dorsalgia, unspecified      Evaluation Date: 3/2/2023  Authorization Period Expiration: 2/10/24  Plan of Care Expiration: 4/14/23  Progress Note Due: 4/14/23  Visit # / Visits authorized: 7/20 (+1)  FOTO: 8/10 - Next  PTA: 0/5     Precautions: Standard    Time In: 1:00 AM  Time Out: 1:50 AM  Total Billable Time: 30 1:1 and 18 supervised minutes (2 TE)    SUBJECTIVE     Pt reports: pain today is located in left buttock. Home exercise program feels good. Still tough briefly getting up in the morning. He does get relief with sitting for a little after standing for 15-20 minutes    He was compliant with home exercise program.  Response to previous treatment: no change  Functional change: no change    Pain: 4-5/10  Location: left buttock/lateral hip    OBJECTIVE     Objective Measures updated at progress report unless specified.       3/29/23  Decreased right knee extension lending to right<left leg length, level ASIS today.  Leg length:  Right: 36.5" (may be due to 10 degree flexion contracture at right knee)  Left: 37"    Strength:  Hip abd: right: 4+/5, left 3+/5 (gluteus medius)  Hip external rotation: right: 5/5, left 4+/5  Hip internal rotation: right: 4+/5, left: 4-/5    Treatment     Octavio received the treatments listed below:      manual therapy techniques: Joint mobilizations, Manual traction, Myofacial release, Soft tissue Mobilization, and Friction Massage were applied to the: lumbar spine for 00 minutes, including:  MET for left anterior innominate followed by alternating " "isometric hip abd/add    Not today  Grade IV lumbar gapping mobs Bilateral -next  Long axis hip and lumbar distraction    therapeutic exercises to develop strength, endurance, ROM, flexibility, posture, and core stabilization for 30 1:1 and 18 supervised minutes including testing:    Fitted with large heel lift in right shoe    Hip abduction straight leg raise: 2x10 left   Sidelying clams: 20x5'' holds Bilateral, green theraband  Reverse clamshells: 20x5" holds left   Double knees to chest: 30x10'' holds with physioball  Open books: 10x5'' holds Bilateral  Straight leg raise: 2x15, 2# each  Bridges: 2x10 with 3" holds      Not today  Leg press: 3x20 double leg, 5 plates, sled=5    neuromuscular re-education activities to improve: Coordination, Proprioception, and Posture for 00 minutes. The following activities were included:    Hooklying anterior/posterior pelvic tilts: 20x3" holds  Hooklying double leg float, single leg tap down:  20x   Seated anterior/posterior pelvic tilts: 20x3" holds on SWB  Standing anterior/posterior pelvic tilts: 20x3" holds  Hip hinging: next  Palloff press: 30x Bilateral, looped green band    Patient Education and Home Exercises     Home Exercises Provided and Patient Education Provided   Education provided:   - anatomy  - importance of home exercise program compliance (updated on 3/29/23)  - attendance policy    Written Home Exercises Provided: yes. Exercises were reviewed and Octavio was able to demonstrate them prior to the end of the session.  Octavio demonstrated good  understanding of the education provided. See EMR under Patient Instructions for exercises provided during therapy sessions    ASSESSMENT     Octavio is a 72 y.o. male referred to outpatient Physical Therapy with a medical diagnosis of lumbar stenosis. Improvements from injection were short lasting and continued pain localized at left lateral hip present. About 1/2" apparent leg length discrepancy most likely due to " "about 10 degree right knee flexion contracture affecting stance, causing left pelvic hike. Added 1/2" heel lift to right shoe. Left lateral hip weakness present with testing (enough hip extension to better test gluteus medius today compared to evaluation). Updated home exercise program for lateral hip strengthening.    Octavio Is progressing well towards his goals.   Pt prognosis is Good.     Pt will continue to benefit from skilled outpatient physical therapy to address the deficits listed in the problem list box on initial evaluation, provide pt/family education and to maximize pt's level of independence in the home and community environment.     Pt's spiritual, cultural and educational needs considered and pt agreeable to plan of care and goals.     Anticipated barriers to physical therapy: chronic nature of symptoms    Goals:  Short Term Goals: 3 weeks:  1. Patient will demonstrate 5 degree improvement in Bilateral hip internal rotation. MET  2. Patient will demonstrate left hamstring flexibility via 90/90 test < 36 degree for imp roved standing posture. MET  3. Patient will report an understanding and compliance with original home exercise program. MET     Long Term Goals: 6 weeks:  1. Patient will demonstrate proper body mechanics with ADLs like vacuuming and laundry. PROGRESSING; NOT MET  2. Patient will report being able to stand without any symptoms down his left leg. PROGRESSING; NOT MET  3. Patient will improve FOTO to < 31% to improve ADL performance. PROGRESSING; NOT MET  4. Patient will demonstrate improved posture without being flexed without cues. PROGRESSING; NOT MET    PLAN     Cont per POC. Concentrate on left lateral hip strengthening    Pavel Jane, PT                 "

## 2023-03-31 ENCOUNTER — CLINICAL SUPPORT (OUTPATIENT)
Dept: REHABILITATION | Facility: HOSPITAL | Age: 73
End: 2023-03-31
Payer: MEDICARE

## 2023-03-31 ENCOUNTER — DOCUMENTATION ONLY (OUTPATIENT)
Dept: REHABILITATION | Facility: HOSPITAL | Age: 73
End: 2023-03-31

## 2023-03-31 DIAGNOSIS — R52 PAIN AGGRAVATED BY STANDING: Primary | ICD-10-CM

## 2023-03-31 PROCEDURE — 97112 NEUROMUSCULAR REEDUCATION: CPT | Mod: HCNC,PN | Performed by: PHYSICAL THERAPIST

## 2023-03-31 PROCEDURE — 97110 THERAPEUTIC EXERCISES: CPT | Mod: HCNC,PN | Performed by: PHYSICAL THERAPIST

## 2023-03-31 NOTE — PROGRESS NOTES
"[facility]   Physical Therapy Treatment Note     Name: Octavio Constantino  [facility] Number: [id number]    Therapy Diagnosis:   Encounter Diagnosis   Name Primary?    Pain aggravated by standing Yes       Physician: Casey Fernandez MD    Visit Date: 3/31/2023    Physician Orders: PT Eval and Treat   Medical Diagnosis from Referral:   M48.07 (ICD-10-CM) - Spinal stenosis, lumbosacral region   M54.9 (ICD-10-CM) - Dorsalgia, unspecified      Evaluation Date: 3/2/2023  Authorization Period Expiration: 2/10/24  Plan of Care Expiration: 4/14/23  Progress Note Due: 4/14/23  Visit # / Visits authorized: 8/20 (+1)  FOTO: 8/10 - Perform next  PTA: 0/5     Precautions: Standard    Time In: 10:00 AM  Time Out: 10:54 AM  Total Billable Time: 26 1:1 minutes (1 TE, 1 NMR)    SUBJECTIVE     Pt reports: He's felt the new exercises in his hip/buttock, but overall is doing ok    He was compliant with home exercise program.  Response to previous treatment: no change  Functional change: no change    Pain: 4-5/10  Location: left buttock/lateral hip    OBJECTIVE     Objective Measures updated at progress report unless specified.       3/29/23  Decreased right knee extension lending to right<left leg length, level ASIS today.  Leg length:  Right: 36.5" (may be due to 10 degree flexion contracture at right knee)  Left: 37"    Strength:  Hip abd: right: 4+/5, left 3+/5 (gluteus medius)  Hip external rotation: right: 5/5, left 4+/5  Hip internal rotation: right: 4+/5, left: 4-/5    Treatment     Octavio received the treatments listed below:      manual therapy techniques: Joint mobilizations, Manual traction, Myofacial release, Soft tissue Mobilization, and Friction Massage were applied to the: lumbar spine for 00 minutes, including:  MET for left anterior innominate followed by alternating isometric hip abd/add    Not today  Grade IV lumbar gapping mobs Bilateral -next  Long axis hip and lumbar " "distraction    therapeutic exercises to develop strength, endurance, ROM, flexibility, posture, and core stabilization for 16 1:1 and 14 supervised minutes including testing:    Hip abduction straight leg raise: 2x10 each   Sidelying clams: 20x5'' holds Bilateral, green theraband  Reverse clamshells: 20x5" holds each  Double knees to chest: 30x10'' holds with physioball  Open books: 10x5'' holds Bilateral  Straight leg raise: 2x15, 2# each  Bridges: 2x10 with 3" holds    Standing hamstring stretch: 3x20" each  Standing hip swings for active hamstring flexibility: 10x each    Not today  Leg press: 3x20 double leg, 5 plates, sled=5    neuromuscular re-education activities to improve: Coordination, Proprioception, and Posture for 10 1:1 and 8 supervised minutes. The following activities were included:    Hooklying anterior/posterior pelvic tilts: 20x3" holds  Hooklying double leg float, single leg tap down:  20x  - not today  Seated anterior/posterior pelvic tilts: 20x3" holds on SWB  Standing anterior/posterior pelvic tilts: 20x3" holds  Hip hinging: 2x10   Palloff press: 20x Bilateral 13 pounds at CC    Patient Education and Home Exercises     Home Exercises Provided and Patient Education Provided   Education provided:   - anatomy  - importance of home exercise program compliance (updated on 3/29/23)  - attendance policy    Written Home Exercises Provided: yes. Exercises were reviewed and Octavio was able to demonstrate them prior to the end of the session.  Octavio demonstrated good  understanding of the education provided. See EMR under Patient Instructions for exercises provided during therapy sessions    ASSESSMENT     Octavio is a 72 y.o. male referred to outpatient Physical Therapy with a medical diagnosis of lumbar stenosis. Improvements from injection were short lasting and continued pain localized at left lateral hip present. Poor hip hinge depth due to posterior chain tightness. Improvements made after " hamstring stretching. Tactile cues initially needed for technique as well.     Octavio Is progressing well towards his goals.   Pt prognosis is Good.     Pt will continue to benefit from skilled outpatient physical therapy to address the deficits listed in the problem list box on initial evaluation, provide pt/family education and to maximize pt's level of independence in the home and community environment.     Pt's spiritual, cultural and educational needs considered and pt agreeable to plan of care and goals.     Anticipated barriers to physical therapy: chronic nature of symptoms    Goals:  Short Term Goals: 3 weeks:  1. Patient will demonstrate 5 degree improvement in Bilateral hip internal rotation. MET  2. Patient will demonstrate left hamstring flexibility via 90/90 test < 36 degree for imp roved standing posture. MET  3. Patient will report an understanding and compliance with original home exercise program. MET     Long Term Goals: 6 weeks:  1. Patient will demonstrate proper body mechanics with ADLs like vacuuming and laundry. PROGRESSING; NOT MET  2. Patient will report being able to stand without any symptoms down his left leg. PROGRESSING; NOT MET  3. Patient will improve FOTO to < 31% to improve ADL performance. PROGRESSING; NOT MET  4. Patient will demonstrate improved posture without being flexed without cues. PROGRESSING; NOT MET    PLAN     Cont per POC. Concentrate on left lateral hip strengthening    Pavel Jane, PT

## 2023-03-31 NOTE — PROGRESS NOTES
PT met face to face with Home Song PTA to discuss pt's treatment plan and progress towards established goals. Pt will be seen by a physical therapist minimally every 6th visit or every 30 days.    Home Song PTA

## 2023-04-04 ENCOUNTER — CLINICAL SUPPORT (OUTPATIENT)
Dept: REHABILITATION | Facility: HOSPITAL | Age: 73
End: 2023-04-04
Payer: MEDICARE

## 2023-04-04 DIAGNOSIS — R52 PAIN AGGRAVATED BY STANDING: Primary | ICD-10-CM

## 2023-04-04 PROCEDURE — 97112 NEUROMUSCULAR REEDUCATION: CPT | Mod: HCNC,PN | Performed by: PHYSICAL THERAPIST

## 2023-04-04 NOTE — PROGRESS NOTES
"[facility]   Physical Therapy Treatment Note     Name: Octavio Constantino  [facility] Number: [id number]    Therapy Diagnosis:   Encounter Diagnosis   Name Primary?    Pain aggravated by standing Yes       Physician: Casey Fernandez MD    Visit Date: 4/4/2023    Physician Orders: PT Eval and Treat   Medical Diagnosis from Referral:   M48.07 (ICD-10-CM) - Spinal stenosis, lumbosacral region   M54.9 (ICD-10-CM) - Dorsalgia, unspecified      Evaluation Date: 3/2/2023  Authorization Period Expiration: 2/10/24  Plan of Care Expiration: 4/14/23  Progress Note Due: 4/14/23  Visit # / Visits authorized: 9/20 (+1)  FOTO: 9/10 - Perform next  PTA: 0/5     Precautions: Standard    Time In: 8:00 AM  Time Out: 8:59 AM  Total Billable Time: 28 1:1 minutes (2 NMR)    SUBJECTIVE     Pt reports: He's felt the new exercises in his hip/buttock, but overall is doing ok    He was compliant with home exercise program.  Response to previous treatment: no change  Functional change: no change    Pain: 1-2/10  Location: left buttock/lateral hip    OBJECTIVE     Objective Measures updated at progress report unless specified.       3/29/23  Decreased right knee extension lending to right<left leg length, level ASIS today.  Leg length:  Right: 36.5" (may be due to 10 degree flexion contracture at right knee)  Left: 37"    Strength:  Hip abd: right: 4+/5, left 3+/5 (gluteus medius)  Hip external rotation: right: 5/5, left 4+/5  Hip internal rotation: right: 4+/5, left: 4-/5    Treatment     Octavio received the treatments listed below:      manual therapy techniques: Joint mobilizations, Manual traction, Myofacial release, Soft tissue Mobilization, and Friction Massage were applied to the: lumbar spine for 00 minutes, including:  MET for left anterior innominate followed by alternating isometric hip abd/add    Not today  Grade IV lumbar gapping mobs Bilateral -next  Long axis hip and lumbar distraction    therapeutic " "exercises to develop strength, endurance, ROM, flexibility, posture, and core stabilization for 4 1:1 and 26 supervised minutes including testing:    Hip abduction straight leg raise: 2x10 each   Sidelying clams: 20x5'' holds Bilateral, green theraband  Reverse clamshells: 20x5" holds each  Double knees to chest: 30x10'' holds with physioball  Open books: 10x5'' holds Bilateral  Straight leg raise: 2x15, 2# each  Bridges: 2x10 with 3" holds    Standing hamstring stretch: 3x20" each  Standing hip swings for active hamstring flexibility: 10x each    Not today  Leg press: 3x20 double leg, 5 plates, sled=5    neuromuscular re-education activities to improve: Coordination, Proprioception, and Posture for 24 1:1 and 8 supervised minutes. The following activities were included:    Hooklying anterior/posterior pelvic tilts: 20x3" holds  Seated anterior/posterior pelvic tilts: 20x3" holds on SWB  Standing anterior/posterior pelvic tilts: 20x3" holds  Hip hinging: 2x10 (tactile and visual cues needed to avoid lower lumbar hinging)  Squats (<50% depth): 2x10  Single leg RDL to touch rolling stool: 10x each (1 upper extremity use for balance)  Palloff press: 20x Bilateral 13 pounds at CC    Patient Education and Home Exercises     Home Exercises Provided and Patient Education Provided   Education provided:   - anatomy  - importance of home exercise program compliance (updated on 3/29/23)  - attendance policy    Written Home Exercises Provided: yes. Exercises were reviewed and Octavio was able to demonstrate them prior to the end of the session.  Octavio demonstrated good  understanding of the education provided. See EMR under Patient Instructions for exercises provided during therapy sessions    ASSESSMENT     Octavio is a 72 y.o. male referred to outpatient Physical Therapy with a medical diagnosis of lumbar stenosis. Improvements from injection were short lasting and continued pain localized at left lateral hip present. " Increased cues needed for hip hinge to avoid lower lumbar hinge, but improved form and depth by completion of exercise. Able to carry over to squat with <50% depth. Initiated more functional strengthening due to improved symptoms.     Octavio Is progressing well towards his goals.   Pt prognosis is Good.     Pt will continue to benefit from skilled outpatient physical therapy to address the deficits listed in the problem list box on initial evaluation, provide pt/family education and to maximize pt's level of independence in the home and community environment.     Pt's spiritual, cultural and educational needs considered and pt agreeable to plan of care and goals.     Anticipated barriers to physical therapy: chronic nature of symptoms    Goals:  Short Term Goals: 3 weeks:  1. Patient will demonstrate 5 degree improvement in Bilateral hip internal rotation. MET  2. Patient will demonstrate left hamstring flexibility via 90/90 test < 36 degree for imp roved standing posture. MET  3. Patient will report an understanding and compliance with original home exercise program. MET     Long Term Goals: 6 weeks:  1. Patient will demonstrate proper body mechanics with ADLs like vacuuming and laundry. PROGRESSING; NOT MET  2. Patient will report being able to stand without any symptoms down his left leg. PROGRESSING; NOT MET  3. Patient will improve FOTO to < 31% to improve ADL performance. PROGRESSING; NOT MET  4. Patient will demonstrate improved posture without being flexed without cues. PROGRESSING; NOT MET    PLAN     Cont per POC. Concentrate on left lateral hip strengthening and functional strengthening/training    Pavel Jane, PT

## 2023-04-11 ENCOUNTER — CLINICAL SUPPORT (OUTPATIENT)
Dept: REHABILITATION | Facility: HOSPITAL | Age: 73
End: 2023-04-11
Payer: MEDICARE

## 2023-04-11 DIAGNOSIS — R52 PAIN AGGRAVATED BY STANDING: Primary | ICD-10-CM

## 2023-04-11 PROCEDURE — 97110 THERAPEUTIC EXERCISES: CPT | Mod: HCNC,PN | Performed by: PHYSICAL THERAPIST

## 2023-04-11 PROCEDURE — 97112 NEUROMUSCULAR REEDUCATION: CPT | Mod: HCNC,PN | Performed by: PHYSICAL THERAPIST

## 2023-04-11 NOTE — PROGRESS NOTES
"[facility]   Physical Therapy Treatment Note     Name: Octavio Constantino  [facility] Number: [id number]    Therapy Diagnosis:   Encounter Diagnosis   Name Primary?    Pain aggravated by standing Yes       Physician: Casey Fernandez MD    Visit Date: 4/11/2023    Physician Orders: PT Eval and Treat   Medical Diagnosis from Referral:   M48.07 (ICD-10-CM) - Spinal stenosis, lumbosacral region   M54.9 (ICD-10-CM) - Dorsalgia, unspecified      Evaluation Date: 3/2/2023  Authorization Period Expiration: 2/10/24  Plan of Care Expiration: 4/14/23  Progress Note Due: 4/14/23  Visit # / Visits authorized: 10/20 (+1)  FOTO: 1/10 - Performed 4/11/23  PTA: 0/5     Precautions: Standard    Time In: 11:00 AM  Time Out: 11:59 AM  Total Billable Time: 56 1:1 minutes (2 NMR)    SUBJECTIVE     Pt reports: He was sore in his buttock the next day after last visit, but now fine again    He was compliant with home exercise program.  Response to previous treatment: buttock soreness the next day  Functional change: no change    Pain: 1-2/10  Location: left buttock/lateral hip    OBJECTIVE     Objective Measures updated at progress report unless specified.       3/29/23  Decreased right knee extension lending to right<left leg length, level ASIS today.  Leg length:  Right: 36.5" (may be due to 10 degree flexion contracture at right knee)  Left: 37"    Strength:  Hip abd: right: 4+/5, left 3+/5 (gluteus medius)  Hip external rotation: right: 5/5, left 4+/5  Hip internal rotation: right: 4+/5, left: 4-/5    Treatment     Octavio received the treatments listed below:      manual therapy techniques: Joint mobilizations, Manual traction, Myofacial release, Soft tissue Mobilization, and Friction Massage were applied to the: lumbar spine for 00 minutes, including:  MET for left anterior innominate followed by alternating isometric hip abd/add    Not today  Grade IV lumbar gapping mobs Bilateral -next  Long axis hip and " "lumbar distraction    therapeutic exercises to develop strength, endurance, ROM, flexibility, posture, and core stabilization for 30 1:1 minutes including testing:    Hip abduction straight leg raise: 2x10 each   Sidelying clams: 20x5'' holds Bilateral, green theraband  Reverse clamshells: 20x5" holds each  Open books: 10x5'' holds Bilateral  Double knees to chest: 30x10'' holds with physioball  Straight leg raise: 2x15, 2# each  Bridges: 2x10 with 3" holds    Standing hamstring stretch: 3x20" each  Standing hip swings for active hamstring flexibility: 10x each    Not today  Leg press: 3x20 double leg, 5 plates, sled=5    neuromuscular re-education activities to improve: Coordination, Proprioception, and Posture for 26 1:1 minutes. The following activities were included:    Seated anterior/posterior pelvic tilts: 20x3" holds on SWB  Alternating marches in neutral pelvic position on SWB: 30x each  Standing anterior/posterior pelvic tilts: 20x3" holds  Hip hinging: 2x10 (tactile and visual cues needed to avoid lower lumbar hinging)  Squats (<50% depth): 3x10  Single leg RDL to touch rolling stool: 10x each (1 upper extremity use for balance)  Deadlift to step stool: 12 lb KB 2x10  Palloff press: 20x Bilateral 13 pounds at CC    Patient Education and Home Exercises     Home Exercises Provided and Patient Education Provided   Education provided:   - anatomy  - importance of home exercise program compliance (updated on 3/29/23)  - attendance policy    Written Home Exercises Provided: yes. Exercises were reviewed and Octavio was able to demonstrate them prior to the end of the session.  Octavio demonstrated good  understanding of the education provided. See EMR under Patient Instructions for exercises provided during therapy sessions    ASSESSMENT     Octavio is a 72 y.o. male referred to outpatient Physical Therapy with a medical diagnosis of lumbar stenosis. Improvements from injection were short lasting and continued " pain localized at left lateral hip present. Improved hip hinging technique today after initial moderate cueing. Progressing functional strengthening without increased symptoms. Symptoms remain well controlled. Plan possible discharge next visit.    Octavio Is progressing well towards his goals.   Pt prognosis is Good.     Pt will continue to benefit from skilled outpatient physical therapy to address the deficits listed in the problem list box on initial evaluation, provide pt/family education and to maximize pt's level of independence in the home and community environment.     Pt's spiritual, cultural and educational needs considered and pt agreeable to plan of care and goals.     Anticipated barriers to physical therapy: chronic nature of symptoms    Goals:  Short Term Goals: 3 weeks:  1. Patient will demonstrate 5 degree improvement in Bilateral hip internal rotation. MET  2. Patient will demonstrate left hamstring flexibility via 90/90 test < 36 degree for imp roved standing posture. MET  3. Patient will report an understanding and compliance with original home exercise program. MET     Long Term Goals: 6 weeks:  1. Patient will demonstrate proper body mechanics with ADLs like vacuuming and laundry. MET  2. Patient will report being able to stand without any symptoms down his left leg. MET  3. Patient will improve FOTO to < 31% to improve ADL performance. PROGRESSING; NOT MET  4. Patient will demonstrate improved posture without being flexed without cues. PROGRESSING; NOT MET    PLAN     Cont per POC. Concentrate on left lateral hip strengthening and functional strengthening/training  Possible DC next visit    Pavel Jane, PT

## 2023-04-14 ENCOUNTER — CLINICAL SUPPORT (OUTPATIENT)
Dept: REHABILITATION | Facility: HOSPITAL | Age: 73
End: 2023-04-14
Payer: MEDICARE

## 2023-04-14 DIAGNOSIS — R52 PAIN AGGRAVATED BY STANDING: Primary | ICD-10-CM

## 2023-04-14 PROCEDURE — 97110 THERAPEUTIC EXERCISES: CPT | Mod: HCNC,PN | Performed by: PHYSICAL THERAPIST

## 2023-04-14 NOTE — PROGRESS NOTES
"[facility]   Physical Therapy Treatment and DISCHARGE Note     Name: Octavio Constantino  [facility] Number: [id number]    Therapy Diagnosis:   Encounter Diagnosis   Name Primary?    Pain aggravated by standing Yes       Physician: Casey Fernandez MD    Visit Date: 4/14/2023    Physician Orders: PT Eval and Treat   Medical Diagnosis from Referral:   M48.07 (ICD-10-CM) - Spinal stenosis, lumbosacral region   M54.9 (ICD-10-CM) - Dorsalgia, unspecified      Evaluation Date: 3/2/2023  Authorization Period Expiration: 2/10/24  Plan of Care Expiration: 4/14/23  Progress Note Due: 4/14/23  Visit # / Visits authorized: 11/20 (+1)  FOTO: 1/10 - Performed 4/11/23  PTA: 0/5     Precautions: Standard    Time In: 1:00 PM  Time Out: 1:30 PM  Total Billable Time: 30 1:1 minutes (2 TE)    SUBJECTIVE     Pt reports: He felt good after last visit. Wednesday, he did a lot of sitting visiting someone. He ran a lot of errands yesterday and felt better than in a long time.     He was compliant with home exercise program.  Response to previous treatment: buttock soreness the next day  Functional change: no change    Pain: 1/10  Location: left buttock/lateral hip    OBJECTIVE     Objective Measures updated at progress report unless specified.       3/29/23  Decreased right knee extension lending to right<left leg length, level ASIS today.  Leg length:  Right: 36.5" (may be due to 10 degree flexion contracture at right knee)  Left: 37"      AROM:    Degrees Pain/Dysfunction   Flexion ankles Repeated: NP   Extension 20 Repeated: NP   Right Rotation 100% NP   Left Rotation 100% NP   Right Side Bending 54 cm NP   Left Side Bending 52 cm NP      Hip external rotation PROM: right: 55 deg    left: 45 degree   Hip internal rotation PROM:  right: 25 deg    left: 30 degree   Hip flexion PROM: right: 100 degree              Left: 105 degree      Strength:  RLE   LLE     Hip flexion: 5/5 Hip flexion: 5/5   Hip Abduction: 5/5 " "Hip abduction: 5/5   Hip extension 5/5 Hip extension 5/5   Hip ER 5/5 Hip ER 5/5   Hip IR 5/5 Hip IR 5/5   Knee flexion: 5/5 Knee flexion: 5/5   Knee extension: 5/5 Knee extension: 5/5   Ankle Dorsiflexion: 5/5 Ankle Dorsiflexion: 5/5   Ankle Plantarflexion: 5/5 Ankle Plantarflexion: 5/5      Special Tests:    Degrees Pain/Dysfunction   Hamstring 90/90 Right: 25   Left: 25 NP       Treatment     Octavio received the treatments listed below:      manual therapy techniques: Joint mobilizations, Manual traction, Myofacial release, Soft tissue Mobilization, and Friction Massage were applied to the: lumbar spine for 00 minutes, including:  MET for left anterior innominate followed by alternating isometric hip abd/add    Not today  Grade IV lumbar gapping mobs Bilateral -next  Long axis hip and lumbar distraction    therapeutic exercises to develop strength, endurance, ROM, flexibility, posture, and core stabilization for 30 1:1 minutes including testing:    Lateral step downs: 4" step 10x each  Updated home exercise program  See objective measurements      Patient Education and Home Exercises     Home Exercises Provided and Patient Education Provided   Education provided:   - anatomy  - importance of home exercise program compliance (updated on 3/29/23)  - attendance policy    Written Home Exercises Provided: yes. Exercises were reviewed and Octavio was able to demonstrate them prior to the end of the session.  Octavio demonstrated good  understanding of the education provided. See EMR under Patient Instructions for exercises provided during therapy sessions    ASSESSMENT     Octavio is a 72 y.o. male referred to outpatient Physical Therapy with a medical diagnosis of lumbar stenosis. Improvements from injection were short lasting and continued pain localized at left lateral hip present. Patient able to demonstrate proper body mechanics with squatting and hip hinging. Negative lateral step down test from 4" step, but " exercise added to home exercise program due to difficulty. Updated home exercise program to include lateral hip strengthening. He has met all STGs and LTGs. He is appropriate for discharge at this time.    Octavio Is progressing well towards his goals.   Pt prognosis is Good.     Pt will continue to benefit from skilled outpatient physical therapy to address the deficits listed in the problem list box on initial evaluation, provide pt/family education and to maximize pt's level of independence in the home and community environment.     Pt's spiritual, cultural and educational needs considered and pt agreeable to plan of care and goals.     Anticipated barriers to physical therapy: chronic nature of symptoms    Goals:  Short Term Goals: 3 weeks:  1. Patient will demonstrate 5 degree improvement in Bilateral hip internal rotation. MET  2. Patient will demonstrate left hamstring flexibility via 90/90 test < 36 degree for imp roved standing posture. MET  3. Patient will report an understanding and compliance with original home exercise program. MET     Long Term Goals: 6 weeks:  1. Patient will demonstrate proper body mechanics with ADLs like vacuuming and laundry. MET  2. Patient will report being able to stand without any symptoms down his left leg. MET  3. Patient will improve FOTO to < 31% to improve ADL performance. MET  4. Patient will demonstrate improved posture without being flexed without cues. MET    PLAN     DISCHARGE    Pavel Jane, PT

## 2023-04-26 ENCOUNTER — IMMUNIZATION (OUTPATIENT)
Dept: PHARMACY | Facility: CLINIC | Age: 73
End: 2023-04-26
Payer: MEDICARE

## 2023-04-26 DIAGNOSIS — Z23 NEED FOR VACCINATION: Primary | ICD-10-CM

## 2023-05-16 PROBLEM — Z86.010 HISTORY OF ADENOMATOUS POLYP OF COLON: Status: ACTIVE | Noted: 2023-05-16

## 2023-05-16 PROBLEM — M48.062 LUMBAR STENOSIS WITH NEUROGENIC CLAUDICATION: Chronic | Status: ACTIVE | Noted: 2023-05-16

## 2023-05-16 PROBLEM — M48.062 LUMBAR STENOSIS WITH NEUROGENIC CLAUDICATION: Status: ACTIVE | Noted: 2023-05-16

## 2023-05-16 PROBLEM — Z86.0101 HISTORY OF ADENOMATOUS POLYP OF COLON: Status: ACTIVE | Noted: 2023-05-16

## 2023-05-30 ENCOUNTER — PATIENT MESSAGE (OUTPATIENT)
Dept: ORTHOPEDICS | Facility: CLINIC | Age: 73
End: 2023-05-30
Payer: MEDICARE

## 2023-06-05 ENCOUNTER — PATIENT MESSAGE (OUTPATIENT)
Dept: PAIN MEDICINE | Facility: CLINIC | Age: 73
End: 2023-06-05
Payer: MEDICARE

## 2023-06-05 ENCOUNTER — OFFICE VISIT (OUTPATIENT)
Dept: ORTHOPEDICS | Facility: CLINIC | Age: 73
End: 2023-06-05
Payer: MEDICARE

## 2023-06-05 VITALS — BODY MASS INDEX: 28.31 KG/M2 | HEIGHT: 72 IN | WEIGHT: 209 LBS

## 2023-06-05 DIAGNOSIS — M48.062 SPINAL STENOSIS OF LUMBAR REGION WITH NEUROGENIC CLAUDICATION: Primary | ICD-10-CM

## 2023-06-05 PROCEDURE — 99214 PR OFFICE/OUTPT VISIT, EST, LEVL IV, 30-39 MIN: ICD-10-PCS | Mod: S$GLB,,, | Performed by: ORTHOPAEDIC SURGERY

## 2023-06-05 PROCEDURE — 1125F AMNT PAIN NOTED PAIN PRSNT: CPT | Mod: CPTII,S$GLB,, | Performed by: ORTHOPAEDIC SURGERY

## 2023-06-05 PROCEDURE — 99214 OFFICE O/P EST MOD 30 MIN: CPT | Mod: S$GLB,,, | Performed by: ORTHOPAEDIC SURGERY

## 2023-06-05 PROCEDURE — 1159F MED LIST DOCD IN RCRD: CPT | Mod: CPTII,S$GLB,, | Performed by: ORTHOPAEDIC SURGERY

## 2023-06-05 PROCEDURE — 99999 PR PBB SHADOW E&M-EST. PATIENT-LVL II: CPT | Mod: PBBFAC,,, | Performed by: ORTHOPAEDIC SURGERY

## 2023-06-05 PROCEDURE — 4010F ACE/ARB THERAPY RXD/TAKEN: CPT | Mod: CPTII,S$GLB,, | Performed by: ORTHOPAEDIC SURGERY

## 2023-06-05 PROCEDURE — 4010F PR ACE/ARB THEARPY RXD/TAKEN: ICD-10-PCS | Mod: CPTII,S$GLB,, | Performed by: ORTHOPAEDIC SURGERY

## 2023-06-05 PROCEDURE — 99999 PR PBB SHADOW E&M-EST. PATIENT-LVL II: ICD-10-PCS | Mod: PBBFAC,,, | Performed by: ORTHOPAEDIC SURGERY

## 2023-06-05 PROCEDURE — 1125F PR PAIN SEVERITY QUANTIFIED, PAIN PRESENT: ICD-10-PCS | Mod: CPTII,S$GLB,, | Performed by: ORTHOPAEDIC SURGERY

## 2023-06-05 PROCEDURE — 1159F PR MEDICATION LIST DOCUMENTED IN MEDICAL RECORD: ICD-10-PCS | Mod: CPTII,S$GLB,, | Performed by: ORTHOPAEDIC SURGERY

## 2023-06-05 NOTE — PROGRESS NOTES
DATE: 6/7/2023  PATIENT: Octavio Constantino    Attending Physician: Casey Fernandez M.D.    CHIEF COMPLAINT:  Left lower extremity radiculopathy    HISTORY:  Octavio Constantino is a 73 y.o. male male who presents for a 2 year history of left lower extremity radiculopathy.  He reports his pain is worst in the morning.  He notes some alterations to his gait and pain with ambulation or prolonged standing.  Thus far he is treated with meloxicam.  He has not had physical therapy or an epidural steroid injection.  He does have a positive shopping cart sign.  The patient reports he had a similar episode about 11 years ago that responded to conservative treatment.     The Patient denies myelopathic symptoms such as handwriting changes or difficulty with buttons/coins/keys. Denies perineal paresthesias, bowel/bladder dysfunction.    Interval History (6/5/23): Octavio Constantino is a 73 y.o. male here for fu appt after bl L2/L3 ISMAEL 3 months ago. Pt reports minimal relief from ISMAEL. Continues to have predominantly L>R radicular pain with neurogenic claudications.     PAST MEDICAL/SURGICAL HISTORY:  Past Medical History:   Diagnosis Date    Asthma     Gout     Hypertension     Lumbar stenosis with neurogenic claudication 5/16/2023     Past Surgical History:   Procedure Laterality Date    ADENOIDECTOMY      COLONOSCOPY N/A 5/30/2018    Procedure: COLONOSCOPY;  Surgeon: Srikanth Carver Jr., MD;  Location: [facility] ENDO;  Service: Endoscopy;  Laterality: N/A;    COLONOSCOPY W/ POLYPECTOMY  05/30/2018    EYE SURGERY      MOUTH SURGERY  02/2018    REFRACTIVE SURGERY Right     TONSILLECTOMY      TRANSFORAMINAL EPIDURAL INJECTION OF STEROID Bilateral 3/15/2023    Procedure: Injection,steroid,epidural,transforaminal bilateral L2/3;  Surgeon: Brianda Reilly DO;  Location: [facility] PAIN MGT;  Service: Pain Management;  Laterality: Bilateral;       Current Medications:   Current Outpatient Medications:     fluticasone-salmeterol  diskus inhaler 250-50 mcg, INHALE 1 PUFF TWICE DAILY, Disp: 3 each, Rfl: 3    irbesartan (AVAPRO) 150 MG tablet, Take 1 tablet (150 mg total) by mouth every evening., Disp: 90 tablet, Rfl: 3    LUTEIN/ZEAXANTHIN (OCUVITE LUTEIN 25 ORAL), Take 1 tablet by mouth once daily., Disp: , Rfl:     meloxicam (MOBIC) 15 MG tablet, TAKE 1 TABLET(15 MG) BY MOUTH EVERY DAY, Disp: 30 tablet, Rfl: 11    Social History:   Social History     Socioeconomic History    Marital status:    Tobacco Use    Smoking status: Former     Packs/day: 1.00     Years: 25.00     Pack years: 25.00     Types: Cigarettes     Quit date: 1990     Years since quittin.3    Smokeless tobacco: Former   Substance and Sexual Activity    Alcohol use: Yes     Alcohol/week: 32.0 - 39.0 standard drinks     Types: 4 Glasses of wine, 14 - 21 Cans of beer, 14 Shots of liquor per week     Comment: daily    Drug use: No    Sexual activity: Not Currently     Partners: Female        EXAM:  Ht 6' (1.829 m)   Wt 94.8 kg (208 lb 15.9 oz)   BMI 28.34 kg/m²     PHYSICAL EXAMINATION:    Gait: Normal station and gait, no difficulty with toe or heel walk.   Skin: Dorsal lumbar skin negative for rashes, lesions, hairy patches and surgical scars. There is minimal lumbar tenderness to palpation.  Range of motion: Lumbar range of motion is acceptable.  Spinal Balance: Global saggital and coronal spinal balance acceptable, no significant for scoliosis and kyphosis.  Musculoskeletal: No pain with the range of motion of the bilateral hips. No trochanteric tenderness to palpation.  Vascular: Bilateral lower extremities warm and well perfused, Dorsalis pedis pulses 2+ bilaterally.  Neurological: Normal strength and tone in all major motor groups in the bilateral lower extremities. Normal sensation to light touch in the L2-S1 dermatomes bilaterally.  Deep tendon reflexes symmetric 1+ in the bilateral lower extremities.  Negative Babinski bilaterally. Straight leg raise  negative bilaterally.    IMAGING:      Today I personally reviewed AP, Lat and Flex/Ex  upright L-spine that demonstrate severe multilevel lumbar spondylosis.      I was able to review recent MRI of his lumbar spine demonstrates severe central stenosis from L2-3 to L4-5.      Body mass index is 28.34 kg/m².  Hemoglobin A1C   Date Value Ref Range Status   12/21/2022 5.6 4.0 - 5.6 % Final     Comment:     ADA Screening Guidelines:  5.7-6.4%  Consistent with prediabetes  >or=6.5%  Consistent with diabetes    High levels of fetal hemoglobin interfere with the HbA1C  assay. Heterozygous hemoglobin variants (HbS, HgC, etc)do  not significantly interfere with this assay.   However, presence of multiple variants may affect accuracy.     12/08/2021 5.4 4.0 - 5.6 % Final     Comment:     ADA Screening Guidelines:  5.7-6.4%  Consistent with prediabetes  >or=6.5%  Consistent with diabetes    High levels of fetal hemoglobin interfere with the HbA1C  assay. Heterozygous hemoglobin variants (HbS, HgC, etc)do  not significantly interfere with this assay.   However, presence of multiple variants may affect accuracy.     08/12/2019 5.6 4.0 - 5.6 % Final     Comment:     ADA Screening Guidelines:  5.7-6.4%  Consistent with prediabetes  >or=6.5%  Consistent with diabetes  High levels of fetal hemoglobin interfere with the HbA1C  assay. Heterozygous hemoglobin variants (HbS, HgC, etc)do  not significantly interfere with this assay.   However, presence of multiple variants may affect accuracy.         ASSESSMENT/PLAN:    Diagnoses and all orders for this visit:    Spinal stenosis of lumbar region with neurogenic claudication  -     Procedure Order to Pain Management; Future      No follow-ups on file.    Today we discussed options further. Pt not interested in surgery at this time. Will refer for another L2/L3 bl TF ISMAEL.

## 2023-06-08 ENCOUNTER — TELEPHONE (OUTPATIENT)
Dept: PAIN MEDICINE | Facility: CLINIC | Age: 73
End: 2023-06-08
Payer: MEDICARE

## 2023-06-08 DIAGNOSIS — M54.16 LUMBAR RADICULOPATHY: Primary | ICD-10-CM

## 2023-08-02 ENCOUNTER — LAB VISIT (OUTPATIENT)
Dept: LAB | Facility: HOSPITAL | Age: 73
End: 2023-08-02
Payer: MEDICARE

## 2023-08-02 DIAGNOSIS — E80.6 HYPERBILIRUBINEMIA: ICD-10-CM

## 2023-08-02 DIAGNOSIS — M10.9 GOUT, UNSPECIFIED CAUSE, UNSPECIFIED CHRONICITY, UNSPECIFIED SITE: ICD-10-CM

## 2023-08-02 DIAGNOSIS — R79.89 ELEVATED TSH: ICD-10-CM

## 2023-08-02 LAB
ALBUMIN SERPL BCP-MCNC: 4 G/DL (ref 3.5–5.2)
ALP SERPL-CCNC: 68 U/L (ref 55–135)
ALT SERPL W/O P-5'-P-CCNC: 7 U/L (ref 10–44)
ANION GAP SERPL CALC-SCNC: 9 MMOL/L (ref 8–16)
AST SERPL-CCNC: 22 U/L (ref 10–40)
BILIRUB DIRECT SERPL-MCNC: 0.3 MG/DL (ref 0.1–0.3)
BILIRUB SERPL-MCNC: 1 MG/DL (ref 0.1–1)
BUN SERPL-MCNC: 13 MG/DL (ref 8–23)
CALCIUM SERPL-MCNC: 9.3 MG/DL (ref 8.7–10.5)
CHLORIDE SERPL-SCNC: 103 MMOL/L (ref 95–110)
CO2 SERPL-SCNC: 28 MMOL/L (ref 23–29)
CREAT SERPL-MCNC: 0.8 MG/DL (ref 0.5–1.4)
EST. GFR  (NO RACE VARIABLE): >60 ML/MIN/1.73 M^2
GLUCOSE SERPL-MCNC: 91 MG/DL (ref 70–110)
POTASSIUM SERPL-SCNC: 4.8 MMOL/L (ref 3.5–5.1)
PROT SERPL-MCNC: 6.8 G/DL (ref 6–8.4)
SODIUM SERPL-SCNC: 140 MMOL/L (ref 136–145)
T4 FREE SERPL-MCNC: 0.74 NG/DL (ref 0.71–1.51)
TSH SERPL DL<=0.005 MIU/L-ACNC: 5.02 UIU/ML (ref 0.4–4)
URATE SERPL-MCNC: 7.4 MG/DL (ref 3.4–7)

## 2023-08-02 PROCEDURE — 36415 COLL VENOUS BLD VENIPUNCTURE: CPT | Performed by: FAMILY MEDICINE

## 2023-08-02 PROCEDURE — 84443 ASSAY THYROID STIM HORMONE: CPT | Performed by: FAMILY MEDICINE

## 2023-08-02 PROCEDURE — 84439 ASSAY OF FREE THYROXINE: CPT | Performed by: FAMILY MEDICINE

## 2023-08-02 PROCEDURE — 84550 ASSAY OF BLOOD/URIC ACID: CPT | Performed by: FAMILY MEDICINE

## 2023-08-02 PROCEDURE — 82248 BILIRUBIN DIRECT: CPT | Performed by: FAMILY MEDICINE

## 2023-08-02 PROCEDURE — 80053 COMPREHEN METABOLIC PANEL: CPT | Performed by: FAMILY MEDICINE

## 2023-08-08 NOTE — PRE-PROCEDURE INSTRUCTIONS
The following was discussed with pt via phone and sent to pt portal. Pt verbalized understanding.      Arrival Location: [address] [place] [address]. Proceed to the second floor to check in with registration.    Arrival Time: 9:40am    Blood Thinners:  Pt states they are not currently on blood thinners  Pacemaker/ICD: Pt states they do not have a pacemaker/ICD  Recent Infections: Pt states they have not had any recent infections  Wounds: Pt states they do not have any active wounds.  Antibiotics in the past 14 days: Pt states they have not been on antibiotics in the last 2 weeks.    Nothing to eat or drink starting 8 hours prior to your arrival time.  If you are diabetic, nothing to eat or drink starting 6 hours prior to your arrival time.     If you take medications for blood pressure, heart, thyroid &/or cholesterol; you may take them with a sip of water the morning of your procedure.     Refrain from drinking alcohol 24 hours prior to your procedure.     You will need a responsible adult to drive you home after your procedure. You cannot take an Uber, Lyft, taxi after post procedure without an adult to accompany you.     If you start to feel sick (fever, chills, coughing, etc) or start on any antibiotics, please contact Dr. Reilly's office at [phone] to reschedule.

## 2023-08-09 ENCOUNTER — HOSPITAL ENCOUNTER (OUTPATIENT)
Facility: HOSPITAL | Age: 73
Discharge: HOME | End: 2023-08-09
Attending: ANESTHESIOLOGY | Admitting: ANESTHESIOLOGY
Payer: MEDICARE

## 2023-08-09 VITALS
SYSTOLIC BLOOD PRESSURE: 129 MMHG | TEMPERATURE: 98 F | HEART RATE: 64 BPM | RESPIRATION RATE: 18 BRPM | DIASTOLIC BLOOD PRESSURE: 69 MMHG | OXYGEN SATURATION: 96 %

## 2023-08-09 DIAGNOSIS — M54.16 LUMBAR RADICULOPATHY: ICD-10-CM

## 2023-08-09 DIAGNOSIS — G89.29 CHRONIC PAIN: ICD-10-CM

## 2023-08-09 DIAGNOSIS — M51.37 DDD (DEGENERATIVE DISC DISEASE), LUMBOSACRAL: Primary | ICD-10-CM

## 2023-08-09 PROCEDURE — 63600175 PHARM REV CODE 636 W HCPCS: Performed by: ANESTHESIOLOGY

## 2023-08-09 PROCEDURE — 25000003 PHARM REV CODE 250: Performed by: ANESTHESIOLOGY

## 2023-08-09 PROCEDURE — 64483 NJX AA&/STRD TFRM EPI L/S 1: CPT | Mod: 50,,, | Performed by: ANESTHESIOLOGY

## 2023-08-09 PROCEDURE — 64483 PR EPIDURAL INJ, ANES/STEROID, TRANSFORAMINAL, LUMB/SACR, SNGL LEVL: ICD-10-PCS | Mod: 50,,, | Performed by: ANESTHESIOLOGY

## 2023-08-09 PROCEDURE — 25500020 PHARM REV CODE 255: Performed by: ANESTHESIOLOGY

## 2023-08-09 PROCEDURE — 64483 NJX AA&/STRD TFRM EPI L/S 1: CPT | Mod: 50 | Performed by: ANESTHESIOLOGY

## 2023-08-09 RX ORDER — MIDAZOLAM HYDROCHLORIDE 1 MG/ML
INJECTION INTRAMUSCULAR; INTRAVENOUS
Status: DISCONTINUED | OUTPATIENT
Start: 2023-08-09 | End: 2023-08-09 | Stop reason: HOSPADM

## 2023-08-09 RX ORDER — SODIUM CHLORIDE 9 MG/ML
INJECTION, SOLUTION INTRAVENOUS CONTINUOUS
Status: DISCONTINUED | OUTPATIENT
Start: 2023-08-09 | End: 2023-08-09 | Stop reason: HOSPADM

## 2023-08-09 RX ORDER — DEXAMETHASONE SODIUM PHOSPHATE 10 MG/ML
INJECTION INTRAMUSCULAR; INTRAVENOUS
Status: DISCONTINUED | OUTPATIENT
Start: 2023-08-09 | End: 2023-08-09 | Stop reason: HOSPADM

## 2023-08-09 RX ORDER — LIDOCAINE HYDROCHLORIDE 20 MG/ML
INJECTION, SOLUTION EPIDURAL; INFILTRATION; INTRACAUDAL; PERINEURAL
Status: DISCONTINUED | OUTPATIENT
Start: 2023-08-09 | End: 2023-08-09 | Stop reason: HOSPADM

## 2023-08-09 RX ORDER — FENTANYL CITRATE 50 UG/ML
INJECTION, SOLUTION INTRAMUSCULAR; INTRAVENOUS
Status: DISCONTINUED | OUTPATIENT
Start: 2023-08-09 | End: 2023-08-09 | Stop reason: HOSPADM

## 2023-08-09 RX ORDER — LIDOCAINE HYDROCHLORIDE 10 MG/ML
INJECTION, SOLUTION EPIDURAL; INFILTRATION; INTRACAUDAL; PERINEURAL
Status: DISCONTINUED | OUTPATIENT
Start: 2023-08-09 | End: 2023-08-09 | Stop reason: HOSPADM

## 2023-08-09 NOTE — DISCHARGE SUMMARY
Discharge Note  Short Stay      SUMMARY     Admit Date: 8/9/2023    Attending Physician: EDITH TIAN      Discharge Physician: EDITH TIAN      Discharge Date: 8/9/2023 10:18 AM    Procedure(s) (LRB):  bilateral TFESI L3/4 (Bilateral)    Final Diagnosis: Lumbar radiculopathy [M54.16]    Disposition: Home or self care    Patient Instructions:   Current Discharge Medication List        CONTINUE these medications which have NOT CHANGED    Details   fluticasone-salmeterol diskus inhaler 250-50 mcg INHALE 1 PUFF TWICE DAILY  Qty: 3 each, Refills: 3    Associated Diagnoses: Asthma, mild intermittent, well-controlled      irbesartan (AVAPRO) 150 MG tablet Take 1 tablet (150 mg total) by mouth every evening.  Qty: 90 tablet, Refills: 3    Comments: .  Associated Diagnoses: Essential hypertension      LUTEIN/ZEAXANTHIN (OCUVITE LUTEIN 25 ORAL) Take 1 tablet by mouth once daily.      meloxicam (MOBIC) 15 MG tablet TAKE 1 TABLET(15 MG) BY MOUTH EVERY DAY  Qty: 30 tablet, Refills: 11    Comments: ZERO refills remain on this prescription. Your patient is requesting advance approval of refills for this medication to PREVENT ANY MISSED DOSES  Associated Diagnoses: Spinal stenosis, lumbosacral region                 Discharge Diagnosis: Lumbar radiculopathy [M54.16]  Condition on Discharge: Stable with no complications to procedure   Diet on Discharge: Same as before.  Activity: as per instruction sheet.  Discharge to: Home with a responsible adult.  Follow up: 2-4 weeks       Please call my office or pager at [phone] if experienced any weakness or loss of sensation, fever > 101.5, pain uncontrolled with oral medications, persistent nausea/vomiting/or diarrhea, redness or drainage from the incisions, or any other worrisome concerns. If physician on call was not reached or could not communicate with our office for any reason please go to the nearest emergency department

## 2023-08-09 NOTE — OP NOTE
Lumbar Transforaminal Epidural Steroid Injection under Fluoroscopic Guidance    The procedure, risks, benefits, and options were discussed with the patient. There are no contraindications to the procedure. The patent expressed understanding and agreed to the procedure. Informed written consent was obtained prior to the start of the procedure and can be found in the patient's chart.    PATIENT NAME: Octavio Constantino   MRN: [id number]     DATE OF PROCEDURE: 08/09/2023    PROCEDURE:  Bilateral  L3/4 Lumbar Transforaminal Epidural Steroid Injection under Fluoroscopic Guidance    PRE-OP DIAGNOSIS: Lumbar radiculopathy [M54.16] Lumbar radiculopathy [M54.16]    POST-OP DIAGNOSIS: Same    PHYSICIAN: Sanjeev Rosas MD    ASSISTANTS: Miguel Aguilera, M.D. [facility] Fellow       MEDICATIONS INJECTED: Preservative-free Decadron 10mg with 5cc of Lidocaine 1% MPF     LOCAL ANESTHETIC INJECTED: Xylocaine 2%     SEDATION: Versed 1mg and Fentanyl 50mcg                                                                                                                                                                                     Conscious sedation ordered by M.D. Patient re-evaluation prior to administration of conscious sedation. No changes noted in patient's status from initial evaluation. The patient's vital signs were monitored by RN and patient remained hemodynamically stable throughout the procedure.    Event Time In   Sedation Start 1007   Sedation End 1016       ESTIMATED BLOOD LOSS: None    COMPLICATIONS: None    TECHNIQUE: Time-out was performed to identify the patient and procedure to be performed. With the patient laying in a prone position, the surgical area was prepped and draped in the usual sterile fashion using ChloraPrep and a fenestrated drape.The levels were determined under fluoroscopy guidance. Skin anesthesia was achieved by injecting Lidocaine 2% over the injection sites. The transforaminal spaces were then  approached with a 22 gauge, 5 inch spinal quinke needle that was introduced under fluoroscopic guidance in the AP and Lateral views. Once the needle tip was in the area of the transforaminal space, and there was no blood, CSF or paraesthesias, contrast dye Omnipaque (300mg/mL) was injected to confirm placement and there was no vascular runoff. Fluoroscopic imaging in the AP and lateral views revealed a clear outline of the spinal nerve with proximal spread of agent through the neural foramen into the epidural space. 3 mL of the medication mixture listed above was injected slowly at each site. Displacement of the radio opaque contrast after injection of the medication confirmed that the medication went into the area of the transforaminal spaces. The needles were removed and bleeding was nil. A sterile dressing was applied. No specimens collected. The patient tolerated the procedure well.       The patient was monitored after the procedure in the recovery area. They were given post-procedure and discharge instructions to follow at home. The patient was discharged in a stable condition.    EDITH TIAN MD    I reviewed and edited the fellow's note. I conducted my own interview and physical examination. I agree with the findings. I was present and supervising all critical portions of the procedure.    Sanjeev Rosas MD

## 2023-08-09 NOTE — PLAN OF CARE
VSS. Discharge instructions reviewed with patient, verbalized understanding. IV dc'd with cath tip intact. Staff at bedside to help patient dress. Patient wheeled to lobby via staff.

## 2023-08-09 NOTE — H&P
HPI  Patient presenting for Procedure(s) (LRB):  bilateral TFESI L3/4 (Bilateral)     Patient on Anti-coagulation No    No health changes since previous encounter    Past Medical History:   Diagnosis Date    Asthma     Gout     Hypertension     Lumbar stenosis with neurogenic claudication 5/16/2023     Past Surgical History:   Procedure Laterality Date    ADENOIDECTOMY      COLONOSCOPY N/A 5/30/2018    Procedure: COLONOSCOPY;  Surgeon: Srikanth Carver Jr., MD;  Location: [facility] ENDO;  Service: Endoscopy;  Laterality: N/A;    COLONOSCOPY W/ POLYPECTOMY  05/30/2018    EYE SURGERY      MOUTH SURGERY  02/2018    REFRACTIVE SURGERY Right     TONSILLECTOMY      TRANSFORAMINAL EPIDURAL INJECTION OF STEROID Bilateral 3/15/2023    Procedure: Injection,steroid,epidural,transforaminal bilateral L2/3;  Surgeon: Brianda Reilly DO;  Location: [facility] PAIN MGT;  Service: Pain Management;  Laterality: Bilateral;     Review of patient's allergies indicates:  No Known Allergies   Current Facility-Administered Medications   Medication    0.9%  NaCl infusion       PMHx, PSHx, Allergies, Medications reviewed in epic    ROS negative except pain complaints in HPI    OBJECTIVE:    There were no vitals taken for this visit.    PHYSICAL EXAMINATION:    GENERAL: Well appearing, in no acute distress, alert and oriented x3.  PSYCH:  Mood and affect appropriate.  SKIN: Skin color, texture, turgor normal, no rashes or lesions which will impact the procedure.  CV: RRR with palpation of the radial artery.  PULM: No evidence of respiratory difficulty, symmetric chest rise. Clear to auscultation.  NEURO: Cranial nerves grossly intact.    Plan:    Proceed with procedure as planned Procedure(s) (LRB):  bilateral TFESI L3/4 (Bilateral)    EDITH TIAN  08/09/2023

## 2023-08-09 NOTE — PLAN OF CARE
Pt in preop bay 25, VSS and IV inserted. Pt denies any open wounds on body or the use of any immunizations or antibiotics in the past 2 weeks. Pt ready to roll. Called family to verify ride home.

## 2023-08-09 NOTE — DISCHARGE INSTRUCTIONS
Home Care Instructions Pain Management:    1.  DIET:    You may resume your normal diet today.    2.  BATHING:    You may shower with luke warm water.    3.  DRESSING:    You may remove your bandage today.    4.  ACTIVITY LEVEL:      You may resume your normal activities 24 hours after your procedure.    5.  MEDICATIONS:    You may resume your normal medications today.    6.  SPECIAL INSTRUCTIONS:    No heat to the injection site for 24 hours including bath or shower, heating pad, moist heat or hot tubs.    Use an ice pack to the injection site for any pain or discomfort.  Apply ice packs for 20 minute intervals as needed.    If you have received any sedatives by mouth today, you can not drive for 12 hours.    If you have received sedation through an IV, you can not drive for 24 hours.    PLEASE CALL YOUR DOCTOR FOR THE FOLLOWIN.  Redness or swelling around the injection site.  2.  Fever of 101 degrees.  3.  Drainage (pus) from the injection site.  4.  For any continuous bleeding (some dried blood over the incision is normal.)    FOR EMERGENCIES:    If any unusual problems or difficulties occur during clinic hours, call [phone] or dial [phone].    Follow up with with your physician in 2-3 weeks.

## 2023-08-21 ENCOUNTER — PATIENT MESSAGE (OUTPATIENT)
Dept: GASTROENTEROLOGY | Facility: CLINIC | Age: 73
End: 2023-08-21
Payer: MEDICARE

## 2023-08-21 ENCOUNTER — TELEPHONE (OUTPATIENT)
Dept: GASTROENTEROLOGY | Facility: CLINIC | Age: 73
End: 2023-08-21
Payer: MEDICARE

## 2023-08-21 DIAGNOSIS — Z86.010 PERSONAL HISTORY OF COLONIC POLYPS: ICD-10-CM

## 2023-08-21 DIAGNOSIS — Z80.0 FAMILY HISTORY OF COLON CANCER: Primary | ICD-10-CM

## 2023-08-21 RX ORDER — SODIUM PICOSULFATE, MAGNESIUM OXIDE, AND ANHYDROUS CITRIC ACID 12; 3.5; 1 G/175ML; G/175ML; MG/175ML
1 LIQUID ORAL ONCE
Qty: 175 ML | Refills: 0 | Status: SHIPPED | OUTPATIENT
Start: 2023-08-21 | End: 2023-08-21

## 2023-08-21 NOTE — TELEPHONE ENCOUNTER
----- Message from Maria Teresa Luna sent at 8/15/2023 10:45 AM CDT -----  .Type:  Needs Medical Advice    Who Called: pt    Would the patient rather a call back or a response via [facility]? Call back  Best Call Back Number: [phone]  Additional Information:     Pt stated he been leaving messages and never go a call back to reschedule please call pt back

## 2023-08-21 NOTE — TELEPHONE ENCOUNTER
Endoscopy Scheduling Questionnaire:         Are you taking any blood thinners? no               If Yes  Have you been on them for longer than one year? N/a    2. Have you been diagnosed with Diverticulitis in past three months?  no    3. Are you on dialysis or have Kidney Disease? no    4. Previous Colonoscopy?  yes         If yes Do you have a history of colon polyps?  no    5. Family History of Colon Cancer: yes         Relation: Mother       Age at Diagnosis: 60's      6. Are you a diabetic?  no    7. Do you have a history of constipation?  no    8. Are you taking a GLP-1 Agonist? no      Procedure scheduled with Dr. Clemens  on  9/13/2023    The prep being used is Clenpiq.     The patient's prep instructions were sent via patient portal.      CLENPIQ Instructions    You are scheduled for a colonoscopy with Dr. Clemens on 9/13/2023 at [facility] located at [address] [place] [address].  Check in at the admit desk, first floor of the hospital (which is the building on the left).     You will receive a call 2-3 days before your colonoscopy to tell you the time to arrive.  If you have not received a call by the day before your procedure, call the Endoscopy Lab at [phone].    To ensure that your test is accurate and complete, you MUST follow these instructions listed below.  If you have any questions, please call our office at [phone].  Plan on being at the hospital for your procedure for 3-4 hours. Please contact the office at [phone] two days prior to procedure date if reschedule is needed.    1.  Follow a CLEAR LIQUID DIET for the entire day before your scheduled colonoscopy.  This means no solid food the entire day starting when you wake.  You may have as much of the clear liquids as you want throughout the day.   CLEAR LIQUID DIET:   - Avoid Red, Orange, Purple, and/or Blue food coloring   - NO DAIRY   - You can have:  Coffee with sugar (no creamer), tea, water, soda, apple or white  grape juice, chicken or beef broth/bouillon (no meat, noodles, or veggies), green/yellow popsicles, green/yellow Jell-O, lemonade.    2.  AT 5 pm the evening before your colonoscopy, OPEN ONE (1) BOTTLE OF CLENPIQ AND DRINK THE ENTIRE BOTTLE.  DRINK FIVE (5) 8-OUNCE GLASSES OF WATER (40 OUNCES TOTAL) OVER THE NEXT FIVE (5) HOURS.     3.  The endoscopy department will call you 2 days before your colonoscopy to tell you the exact time to arrive, AND to tell you the exact time to drink the 2nd portion of your prep (which will be FIVE HOURS BEFORE YOUR ARRIVAL TIME).  At this time given to you, OPEN THE OTHER ONE (1) BOTTLE OF CLENPIQ AND DRINK THE ENTIRE BOTTLE.  DRINK THREE (3) 8-OUNCE GLASSES OF WATER (24 OUNCES TOTAL) OVER THE NEXT THREE (3) HOURS. Once this is complete, you can not have anything else by mouth!    4.  You must have someone with you to DRIVE YOU HOME since you will be receiving IV sedation for the colonoscopy.    5.  It is ok to take your heart, blood pressure, and seizure medications in the morning of your test with a SIP of water.  Hold other medications until after your procedure.  Do NOT have anything else to eat or drink the morning of your colonoscopy.  It is ok to brush your teeth.    6.  If you are on blood thinners THAT YOU HAVE BEEN INSTRUCTED TO HOLD BY YOUR DOCTOR FOR THIS PROCEDURE, then do NOT take this the morning of your colonoscopy.  Do NOT stop these medications on your own, they must be approved to be held by your doctor.  Your colonoscopy can NOT be done if you are on these medications.  Examples of blood thinners include: Coumadin, Aggrenox, Plavix, Pradaxa, Reapro, Pletal, Xarelto, Ticagrelor, Brilinta, Eliquis, and high dose aspirin (325 mg).  You do not have to stop baby aspirin 81 mg.    7.  IF YOU ARE DIABETIC:  NO INSULIN OR ORAL MEDICATIONS THE MORNING OF THE COLONOSCOPY.  TAKE ONLY HALF THE DOSE OF YOUR INSULIN THE DAY BEFORE THE COLONOSCOPY.  DO NOT TAKE ANY ORAL  DIABETIC MEDICATIONS THE DAY BEFORE THE COLONOSCOPY.  IF YOU ARE AN INSULIN DEPENDENT DIABETIC WITH UNSTABLE BLOOD SUGARS, NOTIFY YOUR PRIMARY CARE PHYSICIAN FOR INSTRUCTIONS.

## 2023-09-01 ENCOUNTER — ANESTHESIA EVENT (OUTPATIENT)
Dept: ENDOSCOPY | Facility: HOSPITAL | Age: 73
End: 2023-09-01
Payer: MEDICARE

## 2023-09-11 ENCOUNTER — TELEPHONE (OUTPATIENT)
Dept: ENDOSCOPY | Facility: HOSPITAL | Age: 73
End: 2023-09-11
Payer: MEDICARE

## 2023-09-11 NOTE — TELEPHONE ENCOUNTER
Spoke with patient about arrival time @ 0845.   Colon/Clenpiq    Prep instructions reviewed: the day before the procedure, follow a clear liquid diet all day, then start the first 1/2 of prep at 5pm and take 2nd 1/2 of prep @ 0345.  Pt must be completely NPO when prep completed @ 0545.              Medications: Do not take Insulin or oral diabetic medications the day of the procedure.  Take as prescribed: heart, seizure and blood pressure medication in the morning with a sip of water (less than an ounce).  Take any breathing medications and bring inhalers to hospital with you Leave all valuables and jewelry at home.     Wear comfortable clothes to procedure to change into hospital gown You cannot drive for 24 hours after your procedure because you will receive sedation for your procedure to make you comfortable.  A ride must be provided at discharge.

## 2023-09-13 ENCOUNTER — HOSPITAL ENCOUNTER (OUTPATIENT)
Facility: HOSPITAL | Age: 73
Discharge: HOME | End: 2023-09-13
Attending: INTERNAL MEDICINE | Admitting: INTERNAL MEDICINE
Payer: MEDICARE

## 2023-09-13 ENCOUNTER — ANESTHESIA (OUTPATIENT)
Dept: ENDOSCOPY | Facility: HOSPITAL | Age: 73
End: 2023-09-13
Payer: MEDICARE

## 2023-09-13 VITALS
OXYGEN SATURATION: 98 % | RESPIRATION RATE: 18 BRPM | BODY MASS INDEX: 29.62 KG/M2 | WEIGHT: 200 LBS | HEART RATE: 80 BPM | TEMPERATURE: 98 F | DIASTOLIC BLOOD PRESSURE: 64 MMHG | HEIGHT: 69 IN | SYSTOLIC BLOOD PRESSURE: 116 MMHG

## 2023-09-13 DIAGNOSIS — Z12.11 COLON CANCER SCREENING: ICD-10-CM

## 2023-09-13 PROCEDURE — D9220A PRA ANESTHESIA: ICD-10-PCS | Mod: PT,ANES,, | Performed by: ANESTHESIOLOGY

## 2023-09-13 PROCEDURE — 25000003 PHARM REV CODE 250: Performed by: NURSE ANESTHETIST, CERTIFIED REGISTERED

## 2023-09-13 PROCEDURE — 45385 COLONOSCOPY W/LESION REMOVAL: CPT | Mod: PT,,, | Performed by: INTERNAL MEDICINE

## 2023-09-13 PROCEDURE — 37000008 HC ANESTHESIA 1ST 15 MINUTES: Performed by: INTERNAL MEDICINE

## 2023-09-13 PROCEDURE — 88305 TISSUE EXAM BY PATHOLOGIST: CPT | Performed by: STUDENT IN AN ORGANIZED HEALTH CARE EDUCATION/TRAINING PROGRAM

## 2023-09-13 PROCEDURE — 27201089 HC SNARE, DISP (ANY): Performed by: INTERNAL MEDICINE

## 2023-09-13 PROCEDURE — 63600175 PHARM REV CODE 636 W HCPCS: Performed by: NURSE ANESTHETIST, CERTIFIED REGISTERED

## 2023-09-13 PROCEDURE — D9220A PRA ANESTHESIA: ICD-10-PCS | Mod: PT,CRNA,, | Performed by: NURSE ANESTHETIST, CERTIFIED REGISTERED

## 2023-09-13 PROCEDURE — D9220A PRA ANESTHESIA: Mod: PT,ANES,, | Performed by: ANESTHESIOLOGY

## 2023-09-13 PROCEDURE — 88305 TISSUE EXAM BY PATHOLOGIST: ICD-10-PCS | Mod: 26,,, | Performed by: STUDENT IN AN ORGANIZED HEALTH CARE EDUCATION/TRAINING PROGRAM

## 2023-09-13 PROCEDURE — 45385 COLONOSCOPY W/LESION REMOVAL: CPT | Mod: PT | Performed by: INTERNAL MEDICINE

## 2023-09-13 PROCEDURE — 88305 TISSUE EXAM BY PATHOLOGIST: CPT | Mod: 26,,, | Performed by: STUDENT IN AN ORGANIZED HEALTH CARE EDUCATION/TRAINING PROGRAM

## 2023-09-13 PROCEDURE — 25000003 PHARM REV CODE 250: Performed by: INTERNAL MEDICINE

## 2023-09-13 PROCEDURE — 37000009 HC ANESTHESIA EA ADD 15 MINS: Performed by: INTERNAL MEDICINE

## 2023-09-13 PROCEDURE — D9220A PRA ANESTHESIA: Mod: PT,CRNA,, | Performed by: NURSE ANESTHETIST, CERTIFIED REGISTERED

## 2023-09-13 PROCEDURE — 45385 PR COLONOSCOPY,REMV LESN,SNARE: ICD-10-PCS | Mod: PT,,, | Performed by: INTERNAL MEDICINE

## 2023-09-13 RX ORDER — PROPOFOL 10 MG/ML
VIAL (ML) INTRAVENOUS CONTINUOUS PRN
Status: DISCONTINUED | OUTPATIENT
Start: 2023-09-13 | End: 2023-09-13

## 2023-09-13 RX ORDER — PROPOFOL 10 MG/ML
VIAL (ML) INTRAVENOUS
Status: DISCONTINUED | OUTPATIENT
Start: 2023-09-13 | End: 2023-09-13

## 2023-09-13 RX ORDER — SODIUM CHLORIDE 0.9 % (FLUSH) 0.9 %
10 SYRINGE (ML) INJECTION
Status: DISCONTINUED | OUTPATIENT
Start: 2023-09-13 | End: 2023-09-13 | Stop reason: HOSPADM

## 2023-09-13 RX ORDER — LIDOCAINE HYDROCHLORIDE 10 MG/ML
INJECTION, SOLUTION INTRAVENOUS
Status: DISCONTINUED | OUTPATIENT
Start: 2023-09-13 | End: 2023-09-13

## 2023-09-13 RX ORDER — SODIUM CHLORIDE 9 MG/ML
INJECTION, SOLUTION INTRAVENOUS CONTINUOUS
Status: DISCONTINUED | OUTPATIENT
Start: 2023-09-13 | End: 2023-09-13 | Stop reason: HOSPADM

## 2023-09-13 RX ADMIN — SODIUM CHLORIDE: 9 INJECTION, SOLUTION INTRAVENOUS at 09:09

## 2023-09-13 RX ADMIN — PROPOFOL 80 MG: 10 INJECTION, EMULSION INTRAVENOUS at 10:09

## 2023-09-13 RX ADMIN — LIDOCAINE HYDROCHLORIDE 50 MG: 10 INJECTION, SOLUTION INTRAVENOUS at 10:09

## 2023-09-13 RX ADMIN — PROPOFOL 90 MCG/KG/MIN: 10 INJECTION, EMULSION INTRAVENOUS at 10:09

## 2023-09-13 RX ADMIN — SODIUM CHLORIDE: 0.9 INJECTION, SOLUTION INTRAVENOUS at 10:09

## 2023-09-13 NOTE — H&P
Short Stay Endoscopy History and Physical    PCP - Devyn Sullivan MD    Procedure - Colonoscopy  ASA - III  Mallampati - per anesthesia  History of Anesthesia problems - no  Family history Anesthesia problems - no     HPI:  This is a 73 y.o. male here for evaluation of : Colon polyps    Pt here today for surveillance of prior colon polyps. The most recent exam was in 2018, at which time patients recalls having polyps removed. Since patient denies change in bowel habits or overt blood in stool. Denies weight loss.     ROS:  Constitutional: No fevers, chills, No weight loss  ENT: No allergies  CV: No chest pain  Pulm: No shortness of breath  GI: see HPI  Derm: No rash    Medical History:  has a past medical history of Asthma, Gout, Hypertension, and Lumbar stenosis with neurogenic claudication (5/16/2023).    Surgical History:  has a past surgical history that includes Mouth surgery (02/2018); Adenoidectomy; Tonsillectomy; Eye surgery; Refractive surgery (Right); Colonoscopy w/ polypectomy (05/30/2018); Colonoscopy (N/A, 5/30/2018); Transforaminal epidural injection of steroid (Bilateral, 3/15/2023); and Transforaminal epidural injection of steroid (Bilateral, 8/9/2023).    Family History: family history includes Alzheimer's disease in his mother; Colon cancer in his mother; No Known Problems in his son; Stroke in his father.. Otherwise no colon cancer, inflammatory bowel disease, or GI malignancies.    Social History:  reports that he quit smoking about 33 years ago. His smoking use included cigarettes. He started smoking about 58 years ago. He has a 25.0 pack-year smoking history. He has quit using smokeless tobacco. He reports current alcohol use of about 32.0 - 39.0 standard drinks of alcohol per week. He reports that he does not use drugs.    Review of patient's allergies indicates:  No Known Allergies    Medications:   Medications Prior to Admission   Medication Sig Dispense Refill Last Dose     fluticasone-salmeterol diskus inhaler 250-50 mcg INHALE 1 PUFF TWICE DAILY 3 each 3 9/13/2023 at 0600    irbesartan (AVAPRO) 150 MG tablet Take 1 tablet (150 mg total) by mouth every evening. 90 tablet 3 9/13/2023 at 0600    meloxicam (MOBIC) 15 MG tablet TAKE 1 TABLET(15 MG) BY MOUTH EVERY DAY 30 tablet 11 9/12/2023    LUTEIN/ZEAXANTHIN (OCUVITE LUTEIN 25 ORAL) Take 1 tablet by mouth once daily.            Objective Findings:    Vital Signs: see nursing notes  Physical Exam:  General Appearance: Well appearing in no acute distress  Eyes:    No scleral icterus  ENT: Neck supple  Lungs: CTA anteriorly  Heart:  S1, S2 normal, no murmurs heard  Abdomen: Soft, non tender, non distended with positive bowel sounds. No hepatosplenomegaly, ascites, or mass  Extremities: no edema  Skin: No rash      Labs:  Lab Results   Component Value Date    WBC 6.30 12/21/2022    HGB 14.8 12/21/2022    HCT 46.5 12/21/2022     12/21/2022    CHOL 199 12/21/2022    TRIG 76 12/21/2022    HDL 83 (H) 12/21/2022    ALT 7 (L) 08/02/2023    AST 22 08/02/2023     08/02/2023    K 4.8 08/02/2023     08/02/2023    CREATININE 0.8 08/02/2023    BUN 13 08/02/2023    CO2 28 08/02/2023    TSH 5.018 (H) 08/02/2023    PSA 1.7 12/21/2022    HGBA1C 5.6 12/21/2022       I have explained the risks and benefits of endoscopy procedures to the patient including but not limited to bleeding, perforation, infection, and death.    Devyn Clemens MD

## 2023-09-13 NOTE — ANESTHESIA PREPROCEDURE EVALUATION
09/13/2023  Octavio Constantino is a 73 y.o., male.    Pre-op Assessment          Review of Systems  Social:  Non-Smoker, Alcohol Use   Cardiovascular:   Hypertension    Pulmonary:   Asthma mild    Neurological:  Neurology Normal    Endocrine:  Endocrine Normal        Physical Exam  General:  Well nourished      Airway/Jaw/Neck:  Airway Findings: Mouth Opening: Normal   Tongue: Normal   General Airway Assessment: Adult Mallampati: II      Dental:  Dental Findings: In tact     Chest/Lungs:  Chest/Lungs Findings: Clear to auscultation, Normal Respiratory Rate      Heart/Vascular:  Heart Findings: Rate: Normal  Rhythm: Regular Rhythm        Mental Status:  Mental Status Findings:  Cooperative, Alert and Oriented         Anesthesia Plan  Type of Anesthesia, risks & benefits discussed:  Anesthesia Type:  general, Gen Natural Airway    Patient's Preference: MAC  Plan Factors:          Intra-op Monitoring Plan: Standard ASA Monitors  Intra-op Monitoring Plan Comments:   Post Op Pain Control Plan: multimodal analgesia  Post Op Pain Control Plan Comments:     Induction:   IV  Beta Blocker:         Informed Consent: Informed consent signed with the Patient and all parties understand the risks and agree with anesthesia plan.  All questions answered.  Anesthesia consent signed with patient.  ASA Score: 2     Day of Surgery Review of History & Physical:              Ready For Surgery From Anesthesia Perspective.           Physical Exam  General: Well nourished    Airway:  Mallampati: II   Mouth Opening: Normal  Tongue: Normal    Dental:  In tact    Chest/Lungs:  Clear to auscultation, Normal Respiratory Rate    Heart:  Rate: Normal  Rhythm: Regular Rhythm          Anesthesia Plan  Type of Anesthesia, risks & benefits discussed:    Anesthesia Type: general, Gen Natural Airway  Intra-op Monitoring Plan: Standard ASA  Monitors  Post Op Pain Control Plan: multimodal analgesia  Induction:  IV  Airway Plan: Direct, Post-Induction  Informed Consent: Informed consent signed with the Patient and all parties understand the risks and agree with anesthesia plan.  All questions answered.   ASA Score: 2    Ready For Surgery From Anesthesia Perspective.       .

## 2023-09-13 NOTE — ANESTHESIA POSTPROCEDURE EVALUATION
Anesthesia Post Evaluation    Patient: Octavio Constantino    Procedure(s) Performed: Procedure(s) (LRB):  COLONOSCOPY (N/A)    Final Anesthesia Type: general      Patient location during evaluation: GI PACU  Patient participation: Yes- Able to Participate  Level of consciousness: awake and alert  Post-procedure vital signs: reviewed and stable  Pain management: adequate  Airway patency: patent    PONV status at discharge: No PONV  Anesthetic complications: no      Cardiovascular status: blood pressure returned to baseline and hemodynamically stable  Respiratory status: unassisted  Hydration status: euvolemic  Follow-up not needed.          Vitals Value Taken Time   /64 09/13/23 1102   Temp 36.7 °C (98.1 °F) 09/13/23 1043   Pulse 80 09/13/23 1102   Resp 18 09/13/23 1102   SpO2 98 % 09/13/23 1102         Event Time   Out of Recovery 11:20:05         Pain/Blaire Score: Blaire Score: 10 (9/13/2023 11:02 AM)

## 2023-09-13 NOTE — TRANSFER OF CARE
"Anesthesia Transfer of Care Note    Patient: Octavio Constantino    Procedure(s) Performed: Procedure(s) (LRB):  COLONOSCOPY (N/A)    Patient location: PACU    Transport from OR: Transported from OR on room air with adequate spontaneous ventilation    Post pain: adequate analgesia    Post assessment: no apparent anesthetic complications    Post vital signs: stable    Level of consciousness: sedated    Nausea/Vomiting: no nausea/vomiting    Complications: none    Transfer of care protocol was followed      Last vitals:   Visit Vitals  BP (!) 143/74 (BP Location: Left arm, Patient Position: Lying)   Pulse 72   Temp 36.9 °C (98.4 °F) (Skin)   Resp 18   Ht 5' 9" (1.753 m)   Wt 90.7 kg (200 lb)   SpO2 95%   BMI 29.53 kg/m²     "

## 2023-09-13 NOTE — PROVATION PATIENT INSTRUCTIONS
Discharge Summary/Instructions after an Endoscopic Procedure  Patient Name: Octavio Constantino  Patient MRN: [id number]  Patient YOB: 1950 Wednesday, September 13, 2023  Devyn Clemens MD  Dear patient,  As a result of recent federal legislation (The Federal Cures Act), you may   receive lab or pathology results from your procedure in your [facility]   account before your physician is able to contact you. Your physician or   their representative will relay the results to you with their   recommendations at their soonest availability.  Thank you,  Your health is very important to us during the Covid Crisis. Following your   procedure today, you will receive a daily text for 2 weeks asking about   signs or symptoms of Covid 19.  Please respond to this text when you   receive it so we can follow up and keep you as safe as possible.   RESTRICTIONS:  During your procedure today, you received medications for sedation.  These   medications may affect your judgment, balance and coordination.  Therefore,   for 24 hours, you have the following restrictions:   - DO NOT drive a car, operate machinery, make legal/financial decisions,   sign important papers or drink alcohol.    ACTIVITY:  Today: no heavy lifting, straining or running due to procedural   sedation/anesthesia.  The following day: return to full activity including work.  DIET:  Eat and drink normally unless instructed otherwise.     TREATMENT FOR COMMON SIDE EFFECTS:  - Mild abdominal pain, nausea, belching, bloating or excessive gas:  rest,   eat lightly and use a heating pad.  - Sore Throat: treat with throat lozenges and/or gargle with warm salt   water.  - Because air was used during the procedure, expelling large amounts of air   from your rectum or belching is normal.  - If a bowel prep was taken, you may not have a bowel movement for 1-3 days.    This is normal.  SYMPTOMS TO WATCH FOR AND REPORT TO YOUR PHYSICIAN:  1. Abdominal pain or  bloating, other than gas cramps.  2. Chest pain.  3. Back pain.  4. Signs of infection such as: chills or fever occurring within 24 hours   after the procedure.  5. Rectal bleeding, which would show as bright red, maroon, or black stools.   (A tablespoon of blood from the rectum is not serious, especially if   hemorrhoids are present.)  6. Vomiting.  7. Weakness or dizziness.  GO DIRECTLY TO THE NEAREST EMERGENCY ROOM IF YOU HAVE ANY OF THE FOLLOWING:      Difficulty breathing              Chills and/or fever over 101 F   Persistent vomiting and/or vomiting blood   Severe abdominal pain   Severe chest pain   Black, tarry stools   Bleeding- more than one tablespoon   Any other symptom or condition that you feel may need urgent attention  Your doctor recommends these additional instructions:  If any biopsies were taken, your doctors clinic will contact you in 1 to 2   weeks with any results.  - Discharge patient to home.   - Resume previous diet.   - Continue present medications.   - Await pathology results.   - Repeat colonoscopy is not recommended due to current age (66 years or   older) for surveillance.   - Patient has a contact number available for emergencies.  The signs and   symptoms of potential delayed complications were discussed with the   patient.  Return to normal activities tomorrow.  Written discharge   instructions were provided to the patient.  For questions, problems or results please call your physician - Devyn Clemens MD.  EMERGENCY PHONE NUMBER: [phone],  LAB RESULTS: [phone]  IF A COMPLICATION OR EMERGENCY SITUATION ARISES AND YOU ARE UNABLE TO REACH   YOUR PHYSICIAN - GO DIRECTLY TO THE EMERGENCY ROOM.  Devyn Clemens MD  9/13/2023 10:39:14 AM  This report has been verified and signed electronically.  Dear patient,  As a result of recent federal legislation (The Federal Cures Act), you may   receive lab or pathology results from your procedure in your  [facility]   account before your physician is able to contact you. Your physician or   their representative will relay the results to you with their   recommendations at their soonest availability.  Thank you,  PROVATION

## 2023-09-19 LAB
FINAL PATHOLOGIC DIAGNOSIS: NORMAL
GROSS: NORMAL
Lab: NORMAL

## 2023-10-21 ENCOUNTER — IMMUNIZATION (OUTPATIENT)
Dept: INTERNAL MEDICINE | Facility: CLINIC | Age: 73
End: 2023-10-21
Payer: MEDICARE

## 2023-10-21 PROCEDURE — 90694 VACC AIIV4 NO PRSRV 0.5ML IM: CPT | Mod: S$GLB,,, | Performed by: FAMILY MEDICINE

## 2023-10-21 PROCEDURE — 90694 FLU VACCINE - QUADRIVALENT - ADJUVANTED: ICD-10-PCS | Mod: S$GLB,,, | Performed by: FAMILY MEDICINE

## 2023-10-21 PROCEDURE — G0008 FLU VACCINE - QUADRIVALENT - ADJUVANTED: ICD-10-PCS | Mod: S$GLB,,, | Performed by: FAMILY MEDICINE

## 2023-10-21 PROCEDURE — G0008 ADMIN INFLUENZA VIRUS VAC: HCPCS | Mod: S$GLB,,, | Performed by: FAMILY MEDICINE

## 2024-01-12 ENCOUNTER — PATIENT MESSAGE (OUTPATIENT)
Dept: ADMINISTRATIVE | Facility: OTHER | Age: 74
End: 2024-01-12
Payer: MEDICARE

## 2024-02-14 ENCOUNTER — LAB VISIT (OUTPATIENT)
Dept: LAB | Facility: HOSPITAL | Age: 74
End: 2024-02-14
Attending: FAMILY MEDICINE
Payer: MEDICARE

## 2024-02-14 DIAGNOSIS — I10 ESSENTIAL HYPERTENSION: ICD-10-CM

## 2024-02-14 LAB
ANION GAP SERPL CALC-SCNC: 10 MMOL/L (ref 8–16)
BUN SERPL-MCNC: 13 MG/DL (ref 8–23)
CALCIUM SERPL-MCNC: 9.1 MG/DL (ref 8.7–10.5)
CHLORIDE SERPL-SCNC: 103 MMOL/L (ref 95–110)
CO2 SERPL-SCNC: 22 MMOL/L (ref 23–29)
CREAT SERPL-MCNC: 0.8 MG/DL (ref 0.5–1.4)
EST. GFR  (NO RACE VARIABLE): >60 ML/MIN/1.73 M^2
GLUCOSE SERPL-MCNC: 89 MG/DL (ref 70–110)
POTASSIUM SERPL-SCNC: 4.3 MMOL/L (ref 3.5–5.1)
SODIUM SERPL-SCNC: 135 MMOL/L (ref 136–145)

## 2024-02-14 PROCEDURE — 36415 COLL VENOUS BLD VENIPUNCTURE: CPT | Performed by: FAMILY MEDICINE

## 2024-02-14 PROCEDURE — 80048 BASIC METABOLIC PNL TOTAL CA: CPT | Performed by: FAMILY MEDICINE

## 2024-04-09 ENCOUNTER — PATIENT MESSAGE (OUTPATIENT)
Dept: ORTHOPEDICS | Facility: CLINIC | Age: 74
End: 2024-04-09
Payer: MEDICARE

## 2024-05-03 ENCOUNTER — OFFICE VISIT (OUTPATIENT)
Dept: ORTHOPEDICS | Facility: CLINIC | Age: 74
End: 2024-05-03
Payer: MEDICARE

## 2024-05-03 ENCOUNTER — HOSPITAL ENCOUNTER (OUTPATIENT)
Dept: RADIOLOGY | Facility: HOSPITAL | Age: 74
Discharge: HOME | End: 2024-05-03
Attending: ORTHOPAEDIC SURGERY
Payer: MEDICARE

## 2024-05-03 VITALS — WEIGHT: 205 LBS | BODY MASS INDEX: 30.36 KG/M2 | HEIGHT: 69 IN

## 2024-05-03 DIAGNOSIS — M50.30 DDD (DEGENERATIVE DISC DISEASE), CERVICAL: ICD-10-CM

## 2024-05-03 DIAGNOSIS — M51.36 DDD (DEGENERATIVE DISC DISEASE), LUMBAR: ICD-10-CM

## 2024-05-03 DIAGNOSIS — M54.12 CERVICAL RADICULOPATHY: Primary | ICD-10-CM

## 2024-05-03 DIAGNOSIS — M48.07 SPINAL STENOSIS, LUMBOSACRAL REGION: ICD-10-CM

## 2024-05-03 DIAGNOSIS — M48.062 SPINAL STENOSIS OF LUMBAR REGION WITH NEUROGENIC CLAUDICATION: ICD-10-CM

## 2024-05-03 DIAGNOSIS — M48.02 SPINAL STENOSIS, CERVICAL REGION: ICD-10-CM

## 2024-05-03 PROCEDURE — 72110 X-RAY EXAM L-2 SPINE 4/>VWS: CPT | Mod: 26,,, | Performed by: RADIOLOGY

## 2024-05-03 PROCEDURE — 99214 OFFICE O/P EST MOD 30 MIN: CPT | Mod: S$GLB,,, | Performed by: ORTHOPAEDIC SURGERY

## 2024-05-03 PROCEDURE — 99999 PR PBB SHADOW E&M-EST. PATIENT-LVL III: CPT | Mod: PBBFAC,,, | Performed by: ORTHOPAEDIC SURGERY

## 2024-05-03 PROCEDURE — 1125F AMNT PAIN NOTED PAIN PRSNT: CPT | Mod: CPTII,S$GLB,, | Performed by: ORTHOPAEDIC SURGERY

## 2024-05-03 PROCEDURE — 1101F PT FALLS ASSESS-DOCD LE1/YR: CPT | Mod: CPTII,S$GLB,, | Performed by: ORTHOPAEDIC SURGERY

## 2024-05-03 PROCEDURE — 72050 X-RAY EXAM NECK SPINE 4/5VWS: CPT | Mod: 26,,, | Performed by: RADIOLOGY

## 2024-05-03 PROCEDURE — 1159F MED LIST DOCD IN RCRD: CPT | Mod: CPTII,S$GLB,, | Performed by: ORTHOPAEDIC SURGERY

## 2024-05-03 PROCEDURE — 1157F ADVNC CARE PLAN IN RCRD: CPT | Mod: CPTII,S$GLB,, | Performed by: ORTHOPAEDIC SURGERY

## 2024-05-03 PROCEDURE — 3288F FALL RISK ASSESSMENT DOCD: CPT | Mod: CPTII,S$GLB,, | Performed by: ORTHOPAEDIC SURGERY

## 2024-05-03 PROCEDURE — 72050 X-RAY EXAM NECK SPINE 4/5VWS: CPT | Mod: TC

## 2024-05-03 PROCEDURE — 72110 X-RAY EXAM L-2 SPINE 4/>VWS: CPT | Mod: TC

## 2024-05-03 NOTE — PROGRESS NOTES
The patient returns for follow-up.  He has known multilevel lumbar stenosis that we have been treating conservatively with 2 epidural steroid injections, and physical therapy.  The patient also endorses bilateral hand paresthesias, and difficulty with buttons.    Today I reviewed new cervical and lumbar spine radiographs demonstrate multilevel cervical and lumbar spondylosis, I also reviewed his prior lumbar MRI that demonstrates severe central and bilateral foraminal stenosis from L2-3 down to L4-5.      Impression:  Symptomatic lumbar stenosis, cervical spondylosis with possible myelopathy.      Plan: Today we discussed options, I have recommended a new MRI of the cervical and lumbar spine, I will see him back to discuss options.

## 2024-05-29 ENCOUNTER — HOSPITAL ENCOUNTER (OUTPATIENT)
Dept: RADIOLOGY | Facility: HOSPITAL | Age: 74
Discharge: HOME | End: 2024-05-29
Attending: ORTHOPAEDIC SURGERY
Payer: MEDICARE

## 2024-05-29 ENCOUNTER — OFFICE VISIT (OUTPATIENT)
Dept: ORTHOPEDICS | Facility: CLINIC | Age: 74
End: 2024-05-29
Payer: MEDICARE

## 2024-05-29 VITALS — HEIGHT: 69 IN | WEIGHT: 205 LBS | BODY MASS INDEX: 30.36 KG/M2

## 2024-05-29 DIAGNOSIS — M48.061 SPINAL STENOSIS OF LUMBAR REGION WITHOUT NEUROGENIC CLAUDICATION: Primary | ICD-10-CM

## 2024-05-29 DIAGNOSIS — M48.07 SPINAL STENOSIS, LUMBOSACRAL REGION: ICD-10-CM

## 2024-05-29 DIAGNOSIS — M48.02 SPINAL STENOSIS, CERVICAL REGION: ICD-10-CM

## 2024-05-29 PROCEDURE — 1101F PT FALLS ASSESS-DOCD LE1/YR: CPT | Mod: CPTII,S$GLB,, | Performed by: ORTHOPAEDIC SURGERY

## 2024-05-29 PROCEDURE — 1157F ADVNC CARE PLAN IN RCRD: CPT | Mod: CPTII,S$GLB,, | Performed by: ORTHOPAEDIC SURGERY

## 2024-05-29 PROCEDURE — 72148 MRI LUMBAR SPINE W/O DYE: CPT | Mod: TC

## 2024-05-29 PROCEDURE — 3288F FALL RISK ASSESSMENT DOCD: CPT | Mod: CPTII,S$GLB,, | Performed by: ORTHOPAEDIC SURGERY

## 2024-05-29 PROCEDURE — 1159F MED LIST DOCD IN RCRD: CPT | Mod: CPTII,S$GLB,, | Performed by: ORTHOPAEDIC SURGERY

## 2024-05-29 PROCEDURE — 72141 MRI NECK SPINE W/O DYE: CPT | Mod: TC

## 2024-05-29 PROCEDURE — 1125F AMNT PAIN NOTED PAIN PRSNT: CPT | Mod: CPTII,S$GLB,, | Performed by: ORTHOPAEDIC SURGERY

## 2024-05-29 PROCEDURE — 99214 OFFICE O/P EST MOD 30 MIN: CPT | Mod: S$GLB,,, | Performed by: ORTHOPAEDIC SURGERY

## 2024-05-29 PROCEDURE — 99999 PR PBB SHADOW E&M-EST. PATIENT-LVL II: CPT | Mod: PBBFAC,,, | Performed by: ORTHOPAEDIC SURGERY

## 2024-05-29 PROCEDURE — 72148 MRI LUMBAR SPINE W/O DYE: CPT | Mod: 26,,, | Performed by: INTERNAL MEDICINE

## 2024-05-29 PROCEDURE — 72141 MRI NECK SPINE W/O DYE: CPT | Mod: 26,,, | Performed by: INTERNAL MEDICINE

## 2024-05-29 NOTE — PROGRESS NOTES
The patient returns for follow-up.  He has known symptomatic lumbar stenosis, and in his last visit I ordered an MRI of the cervical and lumbar spine to evaluate him for possible cervical stenosis.  The patient reports he has bilateral fingertip numbness, but no handwriting changes.  He is difficulty with buttons but only  secondary to the fingertip numbness.    He certainly has symptomatic lumbar stenosis, he has not able to go to [facility] because of his claudication type symptoms.      Today I was able to review the recent cervical and lumbar spine MRIs demonstrate the following: There is multilevel cervical spondylosis, there is a C7-T1 grade 1 spondylolisthesis.  There is multilevel cervical stenosis but no evidence of myelomalacia.    I also reviewed his lumbar spine MRI that demonstrates severe central stenosis at L2-3 L3-4 and L4-5.      Today we extensively discussed options, I do not think he has symptomatic cervical myelopathy.  I do think he is a candidate for an L2-5 T lift versus repeat epidural steroid injection.  The patient will go home and think about his options.

## 2024-06-19 ENCOUNTER — PATIENT MESSAGE (OUTPATIENT)
Dept: ORTHOPEDICS | Facility: CLINIC | Age: 74
End: 2024-06-19
Payer: MEDICARE

## 2024-06-20 ENCOUNTER — PATIENT MESSAGE (OUTPATIENT)
Dept: PAIN MEDICINE | Facility: OTHER | Age: 74
End: 2024-06-20
Payer: MEDICARE

## 2024-06-20 DIAGNOSIS — M48.062 SPINAL STENOSIS OF LUMBAR REGION WITH NEUROGENIC CLAUDICATION: Primary | ICD-10-CM

## 2024-07-18 ENCOUNTER — HOSPITAL ENCOUNTER (OUTPATIENT)
Facility: OTHER | Age: 74
Discharge: HOME | End: 2024-07-18
Attending: ANESTHESIOLOGY | Admitting: ANESTHESIOLOGY
Payer: MEDICARE

## 2024-07-18 VITALS
HEART RATE: 78 BPM | OXYGEN SATURATION: 96 % | HEIGHT: 69 IN | SYSTOLIC BLOOD PRESSURE: 180 MMHG | DIASTOLIC BLOOD PRESSURE: 84 MMHG | TEMPERATURE: 98 F | WEIGHT: 200 LBS | BODY MASS INDEX: 29.62 KG/M2 | RESPIRATION RATE: 16 BRPM

## 2024-07-18 DIAGNOSIS — M54.17 LUMBOSACRAL RADICULOPATHY: ICD-10-CM

## 2024-07-18 DIAGNOSIS — M48.062 LUMBAR STENOSIS WITH NEUROGENIC CLAUDICATION: Primary | Chronic | ICD-10-CM

## 2024-07-18 DIAGNOSIS — G89.29 CHRONIC PAIN: ICD-10-CM

## 2024-07-18 PROCEDURE — 25000003 PHARM REV CODE 250: Performed by: ANESTHESIOLOGY

## 2024-07-18 PROCEDURE — 64483 NJX AA&/STRD TFRM EPI L/S 1: CPT | Mod: 50 | Performed by: ANESTHESIOLOGY

## 2024-07-18 PROCEDURE — 25500020 PHARM REV CODE 255: Performed by: ANESTHESIOLOGY

## 2024-07-18 PROCEDURE — 64483 NJX AA&/STRD TFRM EPI L/S 1: CPT | Mod: 50,,, | Performed by: ANESTHESIOLOGY

## 2024-07-18 PROCEDURE — 63600175 PHARM REV CODE 636 W HCPCS: Performed by: ANESTHESIOLOGY

## 2024-07-18 RX ORDER — SODIUM CHLORIDE 9 MG/ML
INJECTION, SOLUTION INTRAVENOUS CONTINUOUS
Status: DISCONTINUED | OUTPATIENT
Start: 2024-07-18 | End: 2024-07-18 | Stop reason: HOSPADM

## 2024-07-18 RX ORDER — DEXAMETHASONE SODIUM PHOSPHATE 10 MG/ML
INJECTION INTRAMUSCULAR; INTRAVENOUS
Status: DISCONTINUED | OUTPATIENT
Start: 2024-07-18 | End: 2024-07-18 | Stop reason: HOSPADM

## 2024-07-18 RX ORDER — LIDOCAINE HYDROCHLORIDE 10 MG/ML
INJECTION, SOLUTION EPIDURAL; INFILTRATION; INTRACAUDAL; PERINEURAL
Status: DISCONTINUED | OUTPATIENT
Start: 2024-07-18 | End: 2024-07-18 | Stop reason: HOSPADM

## 2024-07-18 RX ORDER — LIDOCAINE HYDROCHLORIDE 20 MG/ML
INJECTION, SOLUTION INFILTRATION; PERINEURAL
Status: DISCONTINUED | OUTPATIENT
Start: 2024-07-18 | End: 2024-07-18 | Stop reason: HOSPADM

## 2024-07-18 NOTE — DISCHARGE INSTRUCTIONS

## 2024-07-18 NOTE — H&P
"HPI  Patient presenting for Procedure(s) (LRB):  LUMBAR TRANSFORAMINAL BILATERAL L3/4 DIRECT REFERRAL (Bilateral)     Patient on Anti-coagulation No    No health changes since previous encounter    Past Medical History:   Diagnosis Date    Asthma     Gout     Hypertension     Lumbar stenosis with neurogenic claudication 5/16/2023     Past Surgical History:   Procedure Laterality Date    ADENOIDECTOMY      COLONOSCOPY N/A 5/30/2018    Procedure: COLONOSCOPY;  Surgeon: Srikanth Carver Jr., MD;  Location: [facility] ENDO;  Service: Endoscopy;  Laterality: N/A;    COLONOSCOPY N/A 9/13/2023    Procedure: COLONOSCOPY;  Surgeon: Devyn Clemens MD;  Location: [facility] ENDO;  Service: Endoscopy;  Laterality: N/A;    COLONOSCOPY W/ POLYPECTOMY  05/30/2018    EYE SURGERY      MOUTH SURGERY  02/2018    REFRACTIVE SURGERY Right     TONSILLECTOMY      TRANSFORAMINAL EPIDURAL INJECTION OF STEROID Bilateral 3/15/2023    Procedure: Injection,steroid,epidural,transforaminal bilateral L2/3;  Surgeon: Brianda Reilly DO;  Location: [facility] PAIN MGT;  Service: Pain Management;  Laterality: Bilateral;    TRANSFORAMINAL EPIDURAL INJECTION OF STEROID Bilateral 8/9/2023    Procedure: bilateral TFESI L3/4;  Surgeon: Sanjeev Rosas MD;  Location: [facility] PAIN MANAGEMENT;  Service: Pain Management;  Laterality: Bilateral;     Review of patient's allergies indicates:  No Known Allergies   Current Facility-Administered Medications   Medication    0.9%  NaCl infusion       PMHx, PSHx, Allergies, Medications reviewed in epic    ROS negative except pain complaints in HPI    OBJECTIVE:    BP (!) 155/79 (BP Location: Right arm, Patient Position: Lying)   Pulse 80   Temp 98.2 °F (36.8 °C) (Oral)   Resp 16   Ht 5' 9" (1.753 m)   Wt 90.7 kg (200 lb)   SpO2 95%   BMI 29.53 kg/m²     PHYSICAL EXAMINATION:    GENERAL: Well appearing, in no acute distress, alert and oriented x3.  PSYCH:  Mood and affect appropriate.  SKIN: Skin color, texture, " turgor normal, no rashes or lesions which will impact the procedure.  CV: RRR with palpation of the radial artery.  PULM: No evidence of respiratory difficulty, symmetric chest rise. Clear to auscultation.  NEURO: Cranial nerves grossly intact.    Plan:    Proceed with procedure as planned Procedure(s) (LRB):  LUMBAR TRANSFORAMINAL BILATERAL L3/4 DIRECT REFERRAL (Bilateral)    [place]ahn[place]  07/18/2024

## 2024-07-18 NOTE — OP NOTE
Lumbar Transforaminal Epidural Steroid Injection under Fluoroscopic Guidance    The procedure, risks, benefits, and options were discussed with the patient. There are no contraindications to the procedure. The patent expressed understanding and agreed to the procedure. Informed written consent was obtained prior to the start of the procedure and can be found in the patient's chart.    PATIENT NAME: Octavio Constantino   MRN: [id number]     DATE OF PROCEDURE: 07/18/2024    PROCEDURE:  Bilateral  L3/4 Lumbar Transforaminal Epidural Steroid Injection under Fluoroscopic Guidance    PRE-OP DIAGNOSIS: Spinal stenosis of lumbar region with neurogenic claudication [M48.062] Lumbar radiculopathy [M54.16]    POST-OP DIAGNOSIS: Same    PHYSICIAN: Sanjeev Rosas MD    ASSISTANTS: Shirin     MEDICATIONS INJECTED: Preservative-free Decadron 10mg with 5cc of Lidocaine 1% MPF     LOCAL ANESTHETIC INJECTED: Xylocaine 2%     SEDATION: None    ESTIMATED BLOOD LOSS: None    COMPLICATIONS: None    TECHNIQUE: Time-out was performed to identify the patient and procedure to be performed. With the patient laying in a prone position, the surgical area was prepped and draped in the usual sterile fashion using ChloraPrep and a fenestrated drape.The levels were determined under fluoroscopy guidance. Skin anesthesia was achieved by injecting Lidocaine 2% over the injection sites. The transforaminal spaces were then approached with a 25 gauge, 3.5 inch spinal quinke needle that was introduced under fluoroscopic guidance in the AP and Lateral views. Once the needle tip was in the area of the transforaminal space, and there was no blood, CSF or paraesthesias, contrast dye Omnipaque (300mg/mL) was injected to confirm placement and there was no vascular runoff. Fluoroscopic imaging in the AP and lateral views revealed a clear outline of the spinal nerve with proximal spread of agent through the neural foramen into the epidural space. 1.5 mL of the  medication mixture listed above was injected slowly at each site. Displacement of the radio opaque contrast after injection of the medication confirmed that the medication went into the area of the transforaminal spaces. The needles were removed and bleeding was nil. A sterile dressing was applied. No specimens collected. The patient tolerated the procedure well.     PRE-PROCEDURE PAIN SCORE: 8/10    POST-PROCEDURE PAIN SCORE: 1/10    The patient was monitored after the procedure in the recovery area. They were given post-procedure and discharge instructions to follow at home. The patient was discharged in a stable condition.    Amanda Baltazar MD    I reviewed and edited the fellow's note. I conducted my own interview and physical examination. I agree with the findings. I was present and supervising all critical portions of the procedure.         Sanjeev Rosas MD

## 2024-07-18 NOTE — DISCHARGE SUMMARY
Discharge Note  Short Stay      SUMMARY     Admit Date: 7/18/2024    Attending Physician: Amanda Baltazar      Discharge Physician: Amanda Baltazar      Discharge Date: 7/18/2024 3:37 PM    Procedure(s) (LRB):  LUMBAR TRANSFORAMINAL BILATERAL L3/4 DIRECT REFERRAL (Bilateral)    Final Diagnosis: Spinal stenosis of lumbar region with neurogenic claudication [M48.062]    Disposition: Home or self care    Patient Instructions:   Current Discharge Medication List        CONTINUE these medications which have NOT CHANGED    Details   COVID bty00-55,12up,,andu,,PF, (SPIKEVAX 9728-4934,12Y UP,,PF,) 50 mcg/0.5 mL injection Inject into the muscle.  Qty: 0.5 mL, Refills: 0      fluticasone-salmeterol diskus inhaler 250-50 mcg INHALE 1 PUFF TWICE DAILY  Qty: 3 each, Refills: 3    Associated Diagnoses: Asthma, mild intermittent, well-controlled      irbesartan (AVAPRO) 300 MG tablet TAKE 1 TABLET EVERY EVENING  Qty: 90 tablet, Refills: 3    Comments: .  Associated Diagnoses: Essential hypertension      LUTEIN/ZEAXANTHIN (OCUVITE LUTEIN 25 ORAL) Take 1 tablet by mouth once daily.      meloxicam (MOBIC) 15 MG tablet Take 1 tablet (15 mg total) by mouth daily as needed for Pain.  Qty: 90 tablet, Refills: 3    Associated Diagnoses: Spinal stenosis, lumbosacral region      NIFEdipine (PROCARDIA-XL) 30 MG (OSM) 24 hr tablet TAKE 1 TABLET(30 MG) BY MOUTH DAILY  Qty: 30 tablet, Refills: 11    Comments: .  Associated Diagnoses: Essential hypertension                 Discharge Diagnosis: Spinal stenosis of lumbar region with neurogenic claudication [M48.062]  Condition on Discharge: Stable with no complications to procedure   Diet on Discharge: Same as before.  Activity: as per instruction sheet.  Discharge to: Home with a responsible adult.  Follow up: 2-4 weeks       Please call my office or pager at [phone] if experienced any weakness or loss of sensation, fever > 101.5, pain uncontrolled with oral medications, persistent  nausea/vomiting/or diarrhea, redness or drainage from the incisions, or any other worrisome concerns. If physician on call was not reached or could not communicate with our office for any reason please go to the nearest emergency department

## 2024-08-06 ENCOUNTER — LAB VISIT (OUTPATIENT)
Dept: LAB | Facility: HOSPITAL | Age: 74
End: 2024-08-06
Attending: FAMILY MEDICINE
Payer: MEDICARE

## 2024-08-06 DIAGNOSIS — I10 ESSENTIAL HYPERTENSION: ICD-10-CM

## 2024-08-06 LAB
ANION GAP SERPL CALC-SCNC: 11 MMOL/L (ref 8–16)
BUN SERPL-MCNC: 15 MG/DL (ref 8–23)
CALCIUM SERPL-MCNC: 9.5 MG/DL (ref 8.7–10.5)
CHLORIDE SERPL-SCNC: 103 MMOL/L (ref 95–110)
CHOLEST SERPL-MCNC: 224 MG/DL (ref 120–199)
CHOLEST/HDLC SERPL: 2.8 {RATIO} (ref 2–5)
CO2 SERPL-SCNC: 25 MMOL/L (ref 23–29)
CREAT SERPL-MCNC: 0.8 MG/DL (ref 0.5–1.4)
EST. GFR  (NO RACE VARIABLE): >60 ML/MIN/1.73 M^2
GLUCOSE SERPL-MCNC: 103 MG/DL (ref 70–110)
HDLC SERPL-MCNC: 80 MG/DL (ref 40–75)
HDLC SERPL: 35.7 % (ref 20–50)
LDLC SERPL CALC-MCNC: 127.6 MG/DL (ref 63–159)
NONHDLC SERPL-MCNC: 144 MG/DL
POTASSIUM SERPL-SCNC: 4.3 MMOL/L (ref 3.5–5.1)
SODIUM SERPL-SCNC: 139 MMOL/L (ref 136–145)
T4 FREE SERPL-MCNC: 0.71 NG/DL (ref 0.71–1.51)
TRIGL SERPL-MCNC: 82 MG/DL (ref 30–150)
TSH SERPL DL<=0.005 MIU/L-ACNC: 4.18 UIU/ML (ref 0.4–4)

## 2024-08-06 PROCEDURE — 80061 LIPID PANEL: CPT | Performed by: FAMILY MEDICINE

## 2024-08-06 PROCEDURE — 36415 COLL VENOUS BLD VENIPUNCTURE: CPT | Performed by: FAMILY MEDICINE

## 2024-08-06 PROCEDURE — 84443 ASSAY THYROID STIM HORMONE: CPT | Performed by: FAMILY MEDICINE

## 2024-08-06 PROCEDURE — 80048 BASIC METABOLIC PNL TOTAL CA: CPT | Performed by: FAMILY MEDICINE

## 2024-08-06 PROCEDURE — 84439 ASSAY OF FREE THYROXINE: CPT | Performed by: FAMILY MEDICINE

## 2024-10-09 ENCOUNTER — IMMUNIZATION (OUTPATIENT)
Dept: INTERNAL MEDICINE | Facility: CLINIC | Age: 74
End: 2024-10-09
Payer: MEDICARE

## 2024-10-09 DIAGNOSIS — Z23 NEED FOR VACCINATION: Primary | ICD-10-CM

## 2024-10-09 PROCEDURE — 90480 ADMN SARSCOV2 VAC 1/ONLY CMP: CPT | Mod: S$GLB,,, | Performed by: INTERNAL MEDICINE

## 2024-10-09 PROCEDURE — 91320 SARSCV2 VAC 30MCG TRS-SUC IM: CPT | Mod: S$GLB,,, | Performed by: INTERNAL MEDICINE

## 2024-10-17 ENCOUNTER — TELEPHONE (OUTPATIENT)
Dept: NEUROSURGERY | Facility: CLINIC | Age: 74
End: 2024-10-17
Payer: MEDICARE

## 2024-10-17 DIAGNOSIS — M48.062 LUMBAR STENOSIS WITH NEUROGENIC CLAUDICATION: Primary | Chronic | ICD-10-CM

## 2024-10-19 ENCOUNTER — IMMUNIZATION (OUTPATIENT)
Dept: INTERNAL MEDICINE | Facility: CLINIC | Age: 74
End: 2024-10-19
Payer: MEDICARE

## 2024-10-19 DIAGNOSIS — Z23 NEED FOR VACCINATION: Primary | ICD-10-CM

## 2024-10-19 PROCEDURE — 90653 IIV ADJUVANT VACCINE IM: CPT | Mod: S$GLB,,, | Performed by: FAMILY MEDICINE

## 2024-10-19 PROCEDURE — G0008 ADMIN INFLUENZA VIRUS VAC: HCPCS | Mod: S$GLB,,, | Performed by: FAMILY MEDICINE

## 2024-10-25 ENCOUNTER — HOSPITAL ENCOUNTER (OUTPATIENT)
Dept: RADIOLOGY | Facility: HOSPITAL | Age: 74
Discharge: HOME | End: 2024-10-25
Attending: NEUROLOGICAL SURGERY
Payer: MEDICARE

## 2024-10-25 DIAGNOSIS — M48.062 LUMBAR STENOSIS WITH NEUROGENIC CLAUDICATION: Chronic | ICD-10-CM

## 2024-10-25 PROCEDURE — 72082 X-RAY EXAM ENTIRE SPI 2/3 VW: CPT | Mod: 26,,, | Performed by: RADIOLOGY

## 2024-10-25 PROCEDURE — 72082 X-RAY EXAM ENTIRE SPI 2/3 VW: CPT | Mod: TC,PN

## 2024-10-25 PROCEDURE — 72110 X-RAY EXAM L-2 SPINE 4/>VWS: CPT | Mod: TC,PN

## 2024-11-20 ENCOUNTER — OFFICE VISIT (OUTPATIENT)
Dept: NEUROSURGERY | Facility: CLINIC | Age: 74
End: 2024-11-20
Payer: MEDICARE

## 2024-11-20 VITALS
BODY MASS INDEX: 30.36 KG/M2 | HEIGHT: 69 IN | HEART RATE: 81 BPM | SYSTOLIC BLOOD PRESSURE: 135 MMHG | WEIGHT: 205 LBS | DIASTOLIC BLOOD PRESSURE: 73 MMHG

## 2024-11-20 DIAGNOSIS — M48.062 LUMBAR STENOSIS WITH NEUROGENIC CLAUDICATION: Chronic | ICD-10-CM

## 2024-11-20 DIAGNOSIS — M47.12 CERVICAL SPONDYLOSIS WITH MYELOPATHY: ICD-10-CM

## 2024-11-20 DIAGNOSIS — M48.061 FORAMINAL STENOSIS OF LUMBAR REGION: Primary | ICD-10-CM

## 2024-11-20 PROCEDURE — 3288F FALL RISK ASSESSMENT DOCD: CPT | Mod: CPTII,S$GLB,, | Performed by: NEUROLOGICAL SURGERY

## 2024-11-20 PROCEDURE — 3078F DIAST BP <80 MM HG: CPT | Mod: CPTII,S$GLB,, | Performed by: NEUROLOGICAL SURGERY

## 2024-11-20 PROCEDURE — 99204 OFFICE O/P NEW MOD 45 MIN: CPT | Mod: S$GLB,,, | Performed by: NEUROLOGICAL SURGERY

## 2024-11-20 PROCEDURE — 1159F MED LIST DOCD IN RCRD: CPT | Mod: CPTII,S$GLB,, | Performed by: NEUROLOGICAL SURGERY

## 2024-11-20 PROCEDURE — 1101F PT FALLS ASSESS-DOCD LE1/YR: CPT | Mod: CPTII,S$GLB,, | Performed by: NEUROLOGICAL SURGERY

## 2024-11-20 PROCEDURE — 3075F SYST BP GE 130 - 139MM HG: CPT | Mod: CPTII,S$GLB,, | Performed by: NEUROLOGICAL SURGERY

## 2024-11-20 PROCEDURE — 99999 PR PBB SHADOW E&M-EST. PATIENT-LVL III: CPT | Mod: PBBFAC,,, | Performed by: NEUROLOGICAL SURGERY

## 2024-11-20 PROCEDURE — 1125F AMNT PAIN NOTED PAIN PRSNT: CPT | Mod: CPTII,S$GLB,, | Performed by: NEUROLOGICAL SURGERY

## 2024-11-20 PROCEDURE — 1157F ADVNC CARE PLAN IN RCRD: CPT | Mod: CPTII,S$GLB,, | Performed by: NEUROLOGICAL SURGERY

## 2024-11-20 NOTE — PROGRESS NOTES
Oswestry Low Back Pain Disability Questionnaire    DATE OF SERVICE:  11/20/2024    ATTENDING PHYSICIAN:  Clemente Lindquist MD    Instructions    This questionnaire has been designed to give us information as to how your back or leg pain is affecting your ability to manage in everyday life. Please answer by checking ONE box in each section for the statement which best applies to you. We realize you may consider that two or more statements in any one section apply but please just shade out the spot that indicates the statement which most clearly describes your problem.    Section 1 - Pain intensity    * I have no pain at the moment.  * The pain is very mild at the moment.  * The pain is moderate at the moment.  * The pain is fairly severe at the moment.  * The pain is very severe at the moment.  * The pain is the worst imaginable at the moment.    Score : 2    Section 2 - Personal care (washing, dressing etc)    * I can look after myself normally without causing extra pain.  * I can look after myself normally but it causes extra pain.  * It is painful to look after myself and I am slow and careful.  * I need some help but manage most of my personal care.  * I need help every day in most aspects of self-care.  * I do not get dressed, I wash with difficulty and stay in bed.    Score : 2    Section 3 - Lifting    * I can lift heavy weights without extra pain.  * I can lift heavy weights but it gives extra pain.  * Pain prevents me from lifting heavy weights off the floor, but I can manage if they are conveniently placed eg. on a table.  * Pain prevents me from lifting heavy weights, but I can manage light to medium weights if they are conveniently positioned.  * I can lift very light weights.  * I cannot lift or carry anything at all.    Score : 0    Section 4 - Walking    * Pain does not prevent me walking any distance.  * Pain prevents me from walking more than 1 mile.         * Pain prevents me from walking more than  1/2 mile.         * Pain prevents me from walking more than 100 yards.            * I can only walk using a stick or crutches.  * I am in bed most of the time.    Score : 3    Section 5 - Sitting    * I can sit in any chair as long as I like.  * I can only sit in my favourite chair as long as I like.  * Pain prevents me sitting more than one hour.  * Pain prevents me from sitting more than 30 minutes.  * Pain prevents me from sitting more than 10 minutes.  * Pain prevents me from sitting at all.    Score : 0    Section 6 - Standing    * I can stand as long as I want without extra pain.  * I can stand as long as I want but it gives me extra pain.  * Pain prevents me from standing for more than 1 hour  * Pain prevents me from standing for more than 30 minutes.  * Pain prevents me from standing for more than 10 minutes.  * Pain prevents me from standing at all.     Score : 3    Section 7 - Sleeping    * My sleep is never disturbed by pain.  * My sleep is occasionally disturbed by pain.  * Because of pain I have less than 6 hours sleep.   * Because of pain I have less than 4 hours sleep.   * Because of pain I have less than 2 hours sleep.  * Pain prevents me from sleeping at all.    Score : 1    Section 8 - Sex life (if applicable)    * My sex life is normal and causes no extra pain.  * My sex life is normal but causes some extra pain.  * My sex life is nearly normal but is very painful.   * My sex life is severely restricted by pain.  * My sex life is nearly absent because of pain.   * Pain prevents any sex life at all.    Score : 0    Section 9 - Social life    * My social life is normal and gives me no extra pain.  * My social life is normal but increases the degree of pain.  * Pain has no significant effect on my social life apart from limiting my more energetic interests eg, sport.  * Pain has restricted my social life and I do not go out as often.  * Pain has restricted my social life to my home.   * I have no  social life because of pain.     Score : 3    Section 10 - Travelling    * I can travel anywhere without pain.  * I can travel anywhere but it gives me extra pain.  * Pain is bad but I manage journeys over two hours.  * Pain restricts me to journeys of less than one hour.  * Pain restricts me to short necessary journeys under 30 minutes.  * Pain prevents me from travelling except to receive treatment.    Score : 3      TOTAL SCORE :  17 / 50 x 2 = 34 %      References  1. [place] TEODORO, Sanchez PB. The Oswestry Disability Index. Spine 2000 Nov 15;25(22):2947-52; discussion 52.  from standing for more than from standing for more than from standing for more than from standing at all

## 2024-11-20 NOTE — PROGRESS NOTES
NEUROSURGICAL OUTPATIENT CONSULTATION NOTE    DATE OF SERVICE:  11/20/2024    ATTENDING PHYSICIAN:  Clemente Lindquist MD    CONSULT REQUESTED BY:  Dr Sullivan    REASON FOR CONSULT:  Bilateral leg pain, difficulty walking    MIR 34%    SUBJECTIVE:    HISTORY:  This is a 74 y.o. male who has been complaining of worsening difficulty ambulating, standing upright and bilateral leg pain for about 2 years.  Pain is relieved by sitting down.  He is able to push a lawnmower were but if he is tries to walk normally he has lot of difficulty.  His symptoms have progressively become worse.  He has tried physical therapy and has received 3 epidural steroid injection with partial pain relief.  He has contemplating chiropractic treatments.  No new onset of motor weakness or sphincter dysfunction.  He is also complaining of bilateral hand, fingertips numbness.  Does not report difficulty buttoning shirts.  Has gait imbalance.      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 07/20/2011    High cholesterol 03/23/2018    Elevated TSH 03/23/2018    Unspecified inflammatory spondylopathy, multiple sites in spine 03/12/2020    Gout 03/12/2020    Asthma 03/12/2020    History of adenomatous polyps of colon 05/16/2023    Lumbar stenosis with neurogenic claudication 05/16/2023     Resolved Ambulatory Problems     Diagnosis Date Noted    Pain aggravated by standing 03/02/2023     Past Medical History:   Diagnosis Date    Gout     Hypertension        PAST SURGICAL HISTORY:  Past Surgical History:   Procedure Laterality Date    ADENOIDECTOMY      COLONOSCOPY N/A 5/30/2018    Procedure: COLONOSCOPY;  Surgeon: Srikanth Carver Jr., MD;  Location: [facility];  Service: Endoscopy;  Laterality: N/A;    COLONOSCOPY N/A 9/13/2023    Procedure: COLONOSCOPY;  Surgeon: Devyn Clemens  MD TAYLA;  Location: [facility] ENDO;  Service: Endoscopy;  Laterality: N/A;    COLONOSCOPY W/ POLYPECTOMY  2018    EYE SURGERY      MOUTH SURGERY  2018    REFRACTIVE SURGERY Right     TONSILLECTOMY      TRANSFORAMINAL EPIDURAL INJECTION OF STEROID Bilateral 3/15/2023    Procedure: Injection,steroid,epidural,transforaminal bilateral L2/3;  Surgeon: Brianda Reilly DO;  Location: [facility] PAIN MGT;  Service: Pain Management;  Laterality: Bilateral;    TRANSFORAMINAL EPIDURAL INJECTION OF STEROID Bilateral 2023    Procedure: bilateral TFESI L3/4;  Surgeon: Sanjeev Rosas MD;  Location: [facility] PAIN MANAGEMENT;  Service: Pain Management;  Laterality: Bilateral;    TRANSFORAMINAL EPIDURAL INJECTION OF STEROID Bilateral 2024    Procedure: LUMBAR TRANSFORAMINAL BILATERAL L3/4 DIRECT REFERRAL;  Surgeon: Sanjeev Rosas MD;  Location: [facility] PAIN MGT;  Service: Pain Management;  Laterality: Bilateral;  [phone]       SOCIAL HISTORY:   Social History     Socioeconomic History    Marital status:    Tobacco Use    Smoking status: Former     Current packs/day: 0.00     Average packs/day: 1 pack/day for 25.0 years (25.0 ttl pk-yrs)     Types: Cigarettes     Start date: 1965     Quit date: 1990     Years since quittin.8    Smokeless tobacco: Former   Substance and Sexual Activity    Alcohol use: Yes     Alcohol/week: 32.0 - 39.0 standard drinks of alcohol     Types: 4 Glasses of wine, 14 - 21 Cans of beer, 14 Shots of liquor per week     Comment: daily    Drug use: No    Sexual activity: Not Currently     Partners: Female     Social Drivers of Health     Financial Resource Strain: Low Risk  (2024)    Overall Financial Resource Strain (CARDIA)     Difficulty of Paying Living Expenses: Not hard at all   Food Insecurity: No Food Insecurity (2024)    Hunger Vital Sign     Worried About Running Out of Food in the Last Year: Never true     Ran Out of Food in the Last Year: Never true    Transportation Needs: No Transportation Needs (4/30/2024)    PRAPARE - Transportation     Lack of Transportation (Medical): No     Lack of Transportation (Non-Medical): No   Physical Activity: Sufficiently Active (4/30/2024)    Exercise Vital Sign     Days of Exercise per Week: 6 days     Minutes of Exercise per Session: 30 min   Housing Stability: Unknown (4/30/2024)    Housing Stability Vital Sign     Unable to Pay for Housing in the Last Year: No       FAMILY HISTORY:  Family History   Problem Relation Name Age of Onset    Alzheimer's disease Mother      Colon cancer Mother      Stroke Father      No Known Problems Son x4        CURRENTS MEDICATIONS:  Current Outpatient Medications on File Prior to Visit   Medication Sig Dispense Refill    irbesartan (AVAPRO) 300 MG tablet TAKE 1 TABLET EVERY EVENING 90 tablet 3    LUTEIN/ZEAXANTHIN (OCUVITE LUTEIN 25 ORAL) Take 1 tablet by mouth once daily.      meloxicam (MOBIC) 15 MG tablet Take 1 tablet (15 mg total) by mouth daily as needed for Pain. 90 tablet 3    NIFEdipine (PROCARDIA-XL) 30 MG (OSM) 24 hr tablet TAKE 1 TABLET(30 MG) BY MOUTH DAILY 30 tablet 11     No current facility-administered medications on file prior to visit.       ALLERGIES:  Review of patient's allergies indicates:  No Known Allergies    REVIEW OF SYSTEMS:  Review of Systems   Constitutional:  Negative for diaphoresis, fever and weight loss.   Respiratory:  Negative for shortness of breath.    Cardiovascular:  Negative for chest pain.   Gastrointestinal:  Negative for blood in stool.   Genitourinary:  Negative for hematuria.   Endo/Heme/Allergies:  Does not bruise/bleed easily.   All other systems reviewed and are negative.      OBJECTIVE:    PHYSICAL EXAMINATION:   Vitals:    11/20/24 1121   BP: 135/73   Pulse: 81       Physical Exam:  Vitals reviewed.    Constitutional: He appears well-developed and well-nourished.     Eyes: Pupils are equal, round, and reactive to light. Conjunctivae and EOM  are normal.     Cardiovascular: Normal distal pulses and no edema.     Abdominal: Soft.     Skin: Skin displays no rash on trunk and no rash on extremities. Skin displays no lesions on trunk and no lesions on extremities.     Psych/Behavior: He is alert. He is oriented to person, place, and time. He has a normal mood and affect.     Musculoskeletal:        Neck: Range of motion is limited.     Neurological:        DTRs: Tricep reflexes are 2+ on the right side and 2+ on the left side. Bicep reflexes are 2+ on the right side and 2+ on the left side. Brachioradialis reflexes are 2+ on the right side and 2+ on the left side. Patellar reflexes are 2+ on the right side and 2+ on the left side. Achilles reflexes are 2+ on the right side and 2+ on the left side.       Back Exam     Muscle Strength   Right Quadriceps:  5/5   Left Quadriceps:  5/5   Right Hamstrings:  5/5   Left Hamstrings:  5/5               Neurological Exam  Mental Status  Alert. Oriented to person, place, and time. Speech is normal.    Cranial Nerves  CN III, IV, VI: Extraocular movements intact bilaterally. Pupils equal round and reactive to light bilaterally.    Motor   Normal muscle tone.                                               Right                     Left  Deltoid                                   5                          5   Biceps                                   5                          5   Triceps                                  5                          5   Interossei                              5                          5   Iliopsoas                               5                          5   Quadriceps                           5                          5   Hamstring                             5                          5   Gastrocnemius                     5                           5   Anterior tibialis                      5                          5   Posterior tibialis                    5                          5    Peroneal                               5                          5    Sensory  Light touch is normal in upper and lower extremities. Pinprick is normal in upper and lower extremities.     Reflexes                                            Right                      Left  Brachioradialis                    2+                         2+  Biceps                                 2+                         2+  Triceps                                2+                         2+  Patellar                                2+                         2+  Achilles                                2+                         2+  Right Plantar: normal  Left Plantar: normal    Right pathological reflexes: Elba's absent. Ankle clonus absent.  Left pathological reflexes: Elba's absent. Ankle clonus absent.        DIAGNOSTIC DATA:  I personally interpreted the following imaging:   Lumbar spine MRI reviewed showing foraminal stenosis, worse at L3-4 and L4-5 bilaterally, severe, moderate-to-severe central canal stenosis at L2-3, L3-4, L4-5.  Loss of lumbar lordosis  Cervical spine MRI reviewed showing moderate central canal stenosis at C4-5 at C5-6, no intramedullary signal change  Scoliosis film reviewed showing loss of lumbar lordosis, no significant sagittal plane imbalance      ASSESSMENT:  This is a 74 y.o. male with     Problem List Items Addressed This Visit          Neuro    Lumbar stenosis with neurogenic claudication (Chronic)     Other Visit Diagnoses       Foraminal stenosis of lumbar region    -  Primary    Cervical spondylosis with myelopathy            The patient appears to have only mild myelopathy symptoms at this time    PLAN:  I explained the natural history of the disease and all treatment options. I recommended a L2-3, L3-4, L4-5 oblique interbody fusion, placement of interbody spacers, DePuy Conduit LLIF cages filled with allograft BMP and DBM, posterior segmental instrumentation using DePuy Viper Prime  system from L2-L5.  The goals of the surgery is to indirectly decompress the severe foraminal stenosis and central canal stenosis, improve lumbar lordosis and achieve long-term arthrodesis.  The goals of the surgery is to improve the patient's neurogenic claudication and radiculopathy symptoms, ability to stand and walk, improvement in functional status.  Regarding his mild myelopathy symptoms we are going to continue to observe at this time.  Patient was instructed on myelopathy symptoms including gait imbalance, worsening hand function and sphincter dysfunction symptoms.    Patient has contemplating surgical intervention at this time.  He will reach out to us on the portal when he is ready to schedule the surgery.    All questions answered.      We have discussed the risks of surgery including death, coma, bleeding, infection, failure of surgery, CSF leak, nerve root injury, spinal cord injury, ureter injury, weakness, paralysis, peripheral neuropathy, malplaced hardware, migration of hardware, non-union, need for reoperation.           Clemente Lindquist MD  Cell:[phone]

## 2024-12-06 ENCOUNTER — PATIENT MESSAGE (OUTPATIENT)
Dept: NEUROSURGERY | Facility: CLINIC | Age: 74
End: 2024-12-06
Payer: MEDICARE

## 2024-12-13 ENCOUNTER — TELEPHONE (OUTPATIENT)
Dept: NEUROSURGERY | Facility: CLINIC | Age: 74
End: 2024-12-13
Payer: MEDICARE

## 2024-12-13 NOTE — TELEPHONE ENCOUNTER
Good Afternoon,  This patient is scheduled to have spinal surgery with  on January 16, 2025. He will need a pre-op clearance appointment scheduled. He will need the following test completed      CBC  CMP  PT/PTT  Urinalysis    EKG  Chest xray      Thanks,  MT

## 2024-12-19 ENCOUNTER — HOSPITAL ENCOUNTER (OUTPATIENT)
Dept: RADIOLOGY | Facility: HOSPITAL | Age: 74
Discharge: HOME | End: 2024-12-19
Attending: NEUROLOGICAL SURGERY
Payer: MEDICARE

## 2024-12-19 DIAGNOSIS — Z01.818 PREOPERATIVE CLEARANCE: ICD-10-CM

## 2024-12-19 PROCEDURE — 71046 X-RAY EXAM CHEST 2 VIEWS: CPT | Mod: TC,PN

## 2024-12-19 PROCEDURE — 71046 X-RAY EXAM CHEST 2 VIEWS: CPT | Mod: 26,,, | Performed by: RADIOLOGY

## 2025-01-03 ENCOUNTER — CLINICAL SUPPORT (OUTPATIENT)
Dept: LAB | Facility: HOSPITAL | Age: 75
End: 2025-01-03
Attending: FAMILY MEDICINE
Payer: MEDICARE

## 2025-01-03 ENCOUNTER — TELEPHONE (OUTPATIENT)
Dept: NEUROSURGERY | Facility: CLINIC | Age: 75
End: 2025-01-03
Payer: MEDICARE

## 2025-01-03 DIAGNOSIS — M48.061 FORAMINAL STENOSIS OF LUMBAR REGION: Primary | ICD-10-CM

## 2025-01-03 DIAGNOSIS — Z98.1 S/P LUMBAR FUSION: ICD-10-CM

## 2025-01-03 DIAGNOSIS — I10 ESSENTIAL HYPERTENSION: ICD-10-CM

## 2025-01-03 DIAGNOSIS — Z01.818 PREOPERATIVE CLEARANCE: ICD-10-CM

## 2025-01-03 PROCEDURE — 93005 ELECTROCARDIOGRAM TRACING: CPT

## 2025-01-03 PROCEDURE — 93010 ELECTROCARDIOGRAM REPORT: CPT | Mod: ,,, | Performed by: INTERNAL MEDICINE

## 2025-01-05 LAB
OHS QRS DURATION: 106 MS
OHS QTC CALCULATION: 418 MS

## 2025-01-09 ENCOUNTER — HOSPITAL ENCOUNTER (OUTPATIENT)
Dept: PREADMISSION TESTING | Facility: HOSPITAL | Age: 75
Discharge: HOME | End: 2025-01-09
Attending: NURSE PRACTITIONER
Payer: MEDICARE

## 2025-01-09 ENCOUNTER — PATIENT MESSAGE (OUTPATIENT)
Dept: PREADMISSION TESTING | Facility: HOSPITAL | Age: 75
End: 2025-01-09

## 2025-01-09 NOTE — PRE-PROCEDURE INSTRUCTIONS
Son     Allergies, medical, surgical, family and psychosocial histories reviewed with patient. Periop plan of care reviewed. Preop instructions given, including medications to take and to hold. Hibiclens soap and instructions on use given. Time allotted for questions to be addressed.        Arrival time 0530.    Please take  Nifedipine the morning of surgery.     Surgery instructions reviewed and surgery booklet sent or emailed to the patient via the portal.

## 2025-01-09 NOTE — DISCHARGE INSTRUCTIONS

## 2025-01-09 NOTE — DISCHARGE INSTRUCTIONS

## 2025-01-15 ENCOUNTER — ANESTHESIA EVENT (OUTPATIENT)
Dept: SURGERY | Facility: HOSPITAL | Age: 75
DRG: 448 | End: 2025-01-15
Payer: MEDICARE

## 2025-01-16 ENCOUNTER — HOSPITAL ENCOUNTER (INPATIENT)
Facility: HOSPITAL | Age: 75
LOS: 3 days | Discharge: HOME HEALTH | DRG: 448 | End: 2025-01-19
Attending: NEUROLOGICAL SURGERY | Admitting: NEUROLOGICAL SURGERY
Payer: MEDICARE

## 2025-01-16 ENCOUNTER — ANESTHESIA (OUTPATIENT)
Dept: SURGERY | Facility: HOSPITAL | Age: 75
DRG: 448 | End: 2025-01-16
Payer: MEDICARE

## 2025-01-16 DIAGNOSIS — I10 ESSENTIAL HYPERTENSION: ICD-10-CM

## 2025-01-16 DIAGNOSIS — M48.061 FORAMINAL STENOSIS OF LUMBAR REGION: Primary | ICD-10-CM

## 2025-01-16 DIAGNOSIS — M48.062 LUMBAR STENOSIS WITH NEUROGENIC CLAUDICATION: Chronic | ICD-10-CM

## 2025-01-16 LAB
ABO + RH BLD: NORMAL
BLD GP AB SCN CELLS X3 SERPL QL: NORMAL
SPECIMEN OUTDATE: NORMAL

## 2025-01-16 PROCEDURE — 97162 PT EVAL MOD COMPLEX 30 MIN: CPT

## 2025-01-16 PROCEDURE — 27201423 OPTIME MED/SURG SUP & DEVICES STERILE SUPPLY: Performed by: NEUROLOGICAL SURGERY

## 2025-01-16 PROCEDURE — D9220A PRA ANESTHESIA: Mod: ANES,,, | Performed by: STUDENT IN AN ORGANIZED HEALTH CARE EDUCATION/TRAINING PROGRAM

## 2025-01-16 PROCEDURE — 01NB0ZZ RELEASE LUMBAR NERVE, OPEN APPROACH: ICD-10-PCS | Performed by: NEUROLOGICAL SURGERY

## 2025-01-16 PROCEDURE — 25000003 PHARM REV CODE 250: Performed by: STUDENT IN AN ORGANIZED HEALTH CARE EDUCATION/TRAINING PROGRAM

## 2025-01-16 PROCEDURE — 0ST20ZZ RESECTION OF LUMBAR VERTEBRAL DISC, OPEN APPROACH: ICD-10-PCS | Performed by: NEUROLOGICAL SURGERY

## 2025-01-16 PROCEDURE — 22558 ARTHRD ANT NTRBD MIN DSC LUM: CPT | Mod: ,,, | Performed by: NEUROLOGICAL SURGERY

## 2025-01-16 PROCEDURE — 22842 INSERT SPINE FIXATION DEVICE: CPT | Mod: ,,, | Performed by: NEUROLOGICAL SURGERY

## 2025-01-16 PROCEDURE — 25000003 PHARM REV CODE 250: Performed by: NEUROLOGICAL SURGERY

## 2025-01-16 PROCEDURE — 94799 UNLISTED PULMONARY SVC/PX: CPT

## 2025-01-16 PROCEDURE — D9220A PRA ANESTHESIA: Mod: CRNA,,, | Performed by: STUDENT IN AN ORGANIZED HEALTH CARE EDUCATION/TRAINING PROGRAM

## 2025-01-16 PROCEDURE — 97530 THERAPEUTIC ACTIVITIES: CPT

## 2025-01-16 PROCEDURE — 97116 GAIT TRAINING THERAPY: CPT

## 2025-01-16 PROCEDURE — 71000039 HC RECOVERY, EACH ADD'L HOUR: Performed by: NEUROLOGICAL SURGERY

## 2025-01-16 PROCEDURE — 36415 COLL VENOUS BLD VENIPUNCTURE: CPT | Performed by: NEUROLOGICAL SURGERY

## 2025-01-16 PROCEDURE — 63600175 PHARM REV CODE 636 W HCPCS: Performed by: NEUROLOGICAL SURGERY

## 2025-01-16 PROCEDURE — 27800903 OPTIME MED/SURG SUP & DEVICES OTHER IMPLANTS: Performed by: NEUROLOGICAL SURGERY

## 2025-01-16 PROCEDURE — 36000711: Performed by: NEUROLOGICAL SURGERY

## 2025-01-16 PROCEDURE — 86850 RBC ANTIBODY SCREEN: CPT | Performed by: NEUROLOGICAL SURGERY

## 2025-01-16 PROCEDURE — 94761 N-INVAS EAR/PLS OXIMETRY MLT: CPT

## 2025-01-16 PROCEDURE — 71000033 HC RECOVERY, INTIAL HOUR: Performed by: NEUROLOGICAL SURGERY

## 2025-01-16 PROCEDURE — 99900035 HC TECH TIME PER 15 MIN (STAT)

## 2025-01-16 PROCEDURE — 61783 SCAN PROC SPINAL: CPT | Mod: ,,, | Performed by: NEUROLOGICAL SURGERY

## 2025-01-16 PROCEDURE — 11000001 HC ACUTE MED/SURG PRIVATE ROOM

## 2025-01-16 PROCEDURE — 3E0U0GB INTRODUCTION OF RECOMBINANT BONE MORPHOGENETIC PROTEIN INTO JOINTS, OPEN APPROACH: ICD-10-PCS | Performed by: NEUROLOGICAL SURGERY

## 2025-01-16 PROCEDURE — P9045 ALBUMIN (HUMAN), 5%, 250 ML: HCPCS | Mod: JZ,TB | Performed by: STUDENT IN AN ORGANIZED HEALTH CARE EDUCATION/TRAINING PROGRAM

## 2025-01-16 PROCEDURE — 22585 ARTHRD ANT NTRBD MIN DSC EA: CPT | Mod: ,,, | Performed by: NEUROLOGICAL SURGERY

## 2025-01-16 PROCEDURE — 37000008 HC ANESTHESIA 1ST 15 MINUTES: Performed by: NEUROLOGICAL SURGERY

## 2025-01-16 PROCEDURE — 63600175 PHARM REV CODE 636 W HCPCS: Performed by: STUDENT IN AN ORGANIZED HEALTH CARE EDUCATION/TRAINING PROGRAM

## 2025-01-16 PROCEDURE — 22853 INSJ BIOMECHANICAL DEVICE: CPT | Mod: ,,, | Performed by: NEUROLOGICAL SURGERY

## 2025-01-16 PROCEDURE — 37000009 HC ANESTHESIA EA ADD 15 MINS: Performed by: NEUROLOGICAL SURGERY

## 2025-01-16 PROCEDURE — 20930 SP BONE ALGRFT MORSEL ADD-ON: CPT | Mod: ,,, | Performed by: NEUROLOGICAL SURGERY

## 2025-01-16 PROCEDURE — C1713 ANCHOR/SCREW BN/BN,TIS/BN: HCPCS | Performed by: NEUROLOGICAL SURGERY

## 2025-01-16 PROCEDURE — 36000710: Performed by: NEUROLOGICAL SURGERY

## 2025-01-16 PROCEDURE — 0SG10A0 FUSION OF 2 OR MORE LUMBAR VERTEBRAL JOINTS WITH INTERBODY FUSION DEVICE, ANTERIOR APPROACH, ANTERIOR COLUMN, OPEN APPROACH: ICD-10-PCS | Performed by: NEUROLOGICAL SURGERY

## 2025-01-16 PROCEDURE — 63600175 PHARM REV CODE 636 W HCPCS: Mod: JZ,TB | Performed by: STUDENT IN AN ORGANIZED HEALTH CARE EDUCATION/TRAINING PROGRAM

## 2025-01-16 PROCEDURE — 97165 OT EVAL LOW COMPLEX 30 MIN: CPT

## 2025-01-16 DEVICE — IMPLANTABLE DEVICE: Type: IMPLANTABLE DEVICE | Site: BACK | Status: FUNCTIONAL

## 2025-01-16 DEVICE — KIT BONE GRFT BMP XS: Type: IMPLANTABLE DEVICE | Site: BACK | Status: FUNCTIONAL

## 2025-01-16 DEVICE — SCREW SET SPINAL SINGLE INNER: Type: IMPLANTABLE DEVICE | Site: BACK | Status: FUNCTIONAL

## 2025-01-16 DEVICE — GRAFT PRIME DBM HD BONE 10CC: Type: IMPLANTABLE DEVICE | Site: BACK | Status: FUNCTIONAL

## 2025-01-16 RX ORDER — ALBUMIN HUMAN 50 G/1000ML
SOLUTION INTRAVENOUS
Status: DISCONTINUED | OUTPATIENT
Start: 2025-01-16 | End: 2025-01-16

## 2025-01-16 RX ORDER — DEXAMETHASONE SODIUM PHOSPHATE 4 MG/ML
INJECTION, SOLUTION INTRA-ARTICULAR; INTRALESIONAL; INTRAMUSCULAR; INTRAVENOUS; SOFT TISSUE
Status: DISCONTINUED | OUTPATIENT
Start: 2025-01-16 | End: 2025-01-16

## 2025-01-16 RX ORDER — HEPARIN SODIUM 5000 [USP'U]/ML
5000 INJECTION, SOLUTION INTRAVENOUS; SUBCUTANEOUS EVERY 12 HOURS
Status: DISCONTINUED | OUTPATIENT
Start: 2025-01-16 | End: 2025-01-19 | Stop reason: HOSPADM

## 2025-01-16 RX ORDER — MUPIROCIN 20 MG/G
OINTMENT TOPICAL
Status: DISCONTINUED | OUTPATIENT
Start: 2025-01-16 | End: 2025-01-16 | Stop reason: HOSPADM

## 2025-01-16 RX ORDER — OXYCODONE HYDROCHLORIDE 5 MG/1
10 TABLET ORAL EVERY 6 HOURS PRN
Status: DISCONTINUED | OUTPATIENT
Start: 2025-01-16 | End: 2025-01-19 | Stop reason: HOSPADM

## 2025-01-16 RX ORDER — PREGABALIN 75 MG/1
75 CAPSULE ORAL
Status: COMPLETED | OUTPATIENT
Start: 2025-01-16 | End: 2025-01-16

## 2025-01-16 RX ORDER — CELECOXIB 100 MG/1
200 CAPSULE ORAL
Status: COMPLETED | OUTPATIENT
Start: 2025-01-16 | End: 2025-01-16

## 2025-01-16 RX ORDER — HYDROMORPHONE HYDROCHLORIDE 2 MG/ML
0.5 INJECTION, SOLUTION INTRAMUSCULAR; INTRAVENOUS; SUBCUTANEOUS EVERY 5 MIN PRN
Status: DISCONTINUED | OUTPATIENT
Start: 2025-01-16 | End: 2025-01-16 | Stop reason: HOSPADM

## 2025-01-16 RX ORDER — OXYCODONE HYDROCHLORIDE 5 MG/1
5 TABLET ORAL
Status: DISCONTINUED | OUTPATIENT
Start: 2025-01-16 | End: 2025-01-16 | Stop reason: HOSPADM

## 2025-01-16 RX ORDER — ONDANSETRON HYDROCHLORIDE 2 MG/ML
4 INJECTION, SOLUTION INTRAVENOUS DAILY PRN
Status: DISCONTINUED | OUTPATIENT
Start: 2025-01-16 | End: 2025-01-16 | Stop reason: HOSPADM

## 2025-01-16 RX ORDER — CELECOXIB 100 MG/1
200 CAPSULE ORAL 2 TIMES DAILY
Status: DISCONTINUED | OUTPATIENT
Start: 2025-01-16 | End: 2025-01-19 | Stop reason: HOSPADM

## 2025-01-16 RX ORDER — PROPOFOL 10 MG/ML
VIAL (ML) INTRAVENOUS
Status: DISCONTINUED | OUTPATIENT
Start: 2025-01-16 | End: 2025-01-16

## 2025-01-16 RX ORDER — ACETAMINOPHEN 325 MG/1
650 TABLET ORAL
Status: COMPLETED | OUTPATIENT
Start: 2025-01-16 | End: 2025-01-16

## 2025-01-16 RX ORDER — ONDANSETRON 8 MG/1
8 TABLET, ORALLY DISINTEGRATING ORAL EVERY 6 HOURS PRN
Status: DISCONTINUED | OUTPATIENT
Start: 2025-01-16 | End: 2025-01-19 | Stop reason: HOSPADM

## 2025-01-16 RX ORDER — CEFAZOLIN 2 G/1
2 INJECTION, POWDER, FOR SOLUTION INTRAMUSCULAR; INTRAVENOUS ONCE
Status: COMPLETED | OUTPATIENT
Start: 2025-01-16 | End: 2025-01-16

## 2025-01-16 RX ORDER — PHENYLEPHRINE HYDROCHLORIDE 10 MG/ML
INJECTION INTRAVENOUS
Status: DISCONTINUED | OUTPATIENT
Start: 2025-01-16 | End: 2025-01-16

## 2025-01-16 RX ORDER — EPHEDRINE SULFATE 50 MG/ML
INJECTION, SOLUTION INTRAVENOUS
Status: DISCONTINUED | OUTPATIENT
Start: 2025-01-16 | End: 2025-01-16

## 2025-01-16 RX ORDER — VECURONIUM BROMIDE 1 MG/ML
INJECTION, POWDER, LYOPHILIZED, FOR SOLUTION INTRAVENOUS
Status: DISCONTINUED | OUTPATIENT
Start: 2025-01-16 | End: 2025-01-16

## 2025-01-16 RX ORDER — MUPIROCIN 20 MG/G
OINTMENT TOPICAL 2 TIMES DAILY
Status: DISCONTINUED | OUTPATIENT
Start: 2025-01-16 | End: 2025-01-19 | Stop reason: HOSPADM

## 2025-01-16 RX ORDER — TRAMADOL HYDROCHLORIDE 50 MG/1
50 TABLET ORAL EVERY 6 HOURS PRN
Status: DISCONTINUED | OUTPATIENT
Start: 2025-01-16 | End: 2025-01-19 | Stop reason: HOSPADM

## 2025-01-16 RX ORDER — BISACODYL 10 MG/1
10 SUPPOSITORY RECTAL DAILY
Status: DISCONTINUED | OUTPATIENT
Start: 2025-01-16 | End: 2025-01-19 | Stop reason: HOSPADM

## 2025-01-16 RX ORDER — PROCHLORPERAZINE EDISYLATE 5 MG/ML
5 INJECTION INTRAMUSCULAR; INTRAVENOUS EVERY 6 HOURS PRN
Status: DISCONTINUED | OUTPATIENT
Start: 2025-01-16 | End: 2025-01-19 | Stop reason: HOSPADM

## 2025-01-16 RX ORDER — FENTANYL CITRATE 50 UG/ML
INJECTION, SOLUTION INTRAMUSCULAR; INTRAVENOUS
Status: DISCONTINUED | OUTPATIENT
Start: 2025-01-16 | End: 2025-01-16

## 2025-01-16 RX ORDER — ALUMINUM HYDROXIDE, MAGNESIUM HYDROXIDE, AND SIMETHICONE 1200; 120; 1200 MG/30ML; MG/30ML; MG/30ML
30 SUSPENSION ORAL EVERY 4 HOURS PRN
Status: DISCONTINUED | OUTPATIENT
Start: 2025-01-16 | End: 2025-01-19 | Stop reason: HOSPADM

## 2025-01-16 RX ORDER — CYCLOBENZAPRINE HCL 10 MG
10 TABLET ORAL
Status: COMPLETED | OUTPATIENT
Start: 2025-01-16 | End: 2025-01-16

## 2025-01-16 RX ORDER — NIFEDIPINE 30 MG/1
30 TABLET, EXTENDED RELEASE ORAL DAILY
Status: DISCONTINUED | OUTPATIENT
Start: 2025-01-17 | End: 2025-01-19 | Stop reason: HOSPADM

## 2025-01-16 RX ORDER — AMOXICILLIN 250 MG
2 CAPSULE ORAL NIGHTLY PRN
Status: DISCONTINUED | OUTPATIENT
Start: 2025-01-16 | End: 2025-01-19 | Stop reason: HOSPADM

## 2025-01-16 RX ORDER — METHOCARBAMOL 750 MG/1
750 TABLET, FILM COATED ORAL 3 TIMES DAILY PRN
Status: DISCONTINUED | OUTPATIENT
Start: 2025-01-16 | End: 2025-01-19 | Stop reason: HOSPADM

## 2025-01-16 RX ORDER — HYDROMORPHONE HYDROCHLORIDE 2 MG/ML
INJECTION, SOLUTION INTRAMUSCULAR; INTRAVENOUS; SUBCUTANEOUS
Status: DISCONTINUED | OUTPATIENT
Start: 2025-01-16 | End: 2025-01-16

## 2025-01-16 RX ORDER — ACETAMINOPHEN 325 MG/1
650 TABLET ORAL EVERY 6 HOURS
Status: DISCONTINUED | OUTPATIENT
Start: 2025-01-16 | End: 2025-01-19 | Stop reason: HOSPADM

## 2025-01-16 RX ORDER — OXYCODONE HCL 10 MG/1
10 TABLET, FILM COATED, EXTENDED RELEASE ORAL
Status: COMPLETED | OUTPATIENT
Start: 2025-01-16 | End: 2025-01-16

## 2025-01-16 RX ORDER — HYDROMORPHONE HYDROCHLORIDE 1 MG/ML
1 INJECTION, SOLUTION INTRAMUSCULAR; INTRAVENOUS; SUBCUTANEOUS
Status: DISCONTINUED | OUTPATIENT
Start: 2025-01-16 | End: 2025-01-19 | Stop reason: HOSPADM

## 2025-01-16 RX ORDER — ROCURONIUM BROMIDE 10 MG/ML
INJECTION, SOLUTION INTRAVENOUS
Status: DISCONTINUED | OUTPATIENT
Start: 2025-01-16 | End: 2025-01-16

## 2025-01-16 RX ORDER — ONDANSETRON HYDROCHLORIDE 2 MG/ML
INJECTION, SOLUTION INTRAVENOUS
Status: DISCONTINUED | OUTPATIENT
Start: 2025-01-16 | End: 2025-01-16

## 2025-01-16 RX ORDER — LIDOCAINE HYDROCHLORIDE 20 MG/ML
INJECTION INTRAVENOUS
Status: DISCONTINUED | OUTPATIENT
Start: 2025-01-16 | End: 2025-01-16

## 2025-01-16 RX ORDER — SODIUM CHLORIDE, SODIUM LACTATE, POTASSIUM CHLORIDE, CALCIUM CHLORIDE 600; 310; 30; 20 MG/100ML; MG/100ML; MG/100ML; MG/100ML
INJECTION, SOLUTION INTRAVENOUS CONTINUOUS
Status: DISCONTINUED | OUTPATIENT
Start: 2025-01-16 | End: 2025-01-17

## 2025-01-16 RX ADMIN — PHENYLEPHRINE HYDROCHLORIDE 100 MCG: 10 INJECTION INTRAVENOUS at 07:01

## 2025-01-16 RX ADMIN — FENTANYL CITRATE 100 MCG: 50 INJECTION INTRAMUSCULAR; INTRAVENOUS at 07:01

## 2025-01-16 RX ADMIN — ACETAMINOPHEN 650 MG: 325 TABLET ORAL at 11:01

## 2025-01-16 RX ADMIN — PHENYLEPHRINE HYDROCHLORIDE 200 MCG: 10 INJECTION INTRAVENOUS at 07:01

## 2025-01-16 RX ADMIN — PHENYLEPHRINE HYDROCHLORIDE 100 MCG: 10 INJECTION INTRAVENOUS at 09:01

## 2025-01-16 RX ADMIN — CEFAZOLIN 2 G: 2 INJECTION, POWDER, FOR SOLUTION INTRAMUSCULAR; INTRAVENOUS at 07:01

## 2025-01-16 RX ADMIN — VECURONIUM BROMIDE 5 MG: 10 INJECTION, POWDER, LYOPHILIZED, FOR SOLUTION INTRAVENOUS at 08:01

## 2025-01-16 RX ADMIN — EPHEDRINE SULFATE 10 MG: 50 INJECTION, SOLUTION INTRAMUSCULAR; INTRAVENOUS; SUBCUTANEOUS at 08:01

## 2025-01-16 RX ADMIN — SODIUM CHLORIDE, POTASSIUM CHLORIDE, SODIUM LACTATE AND CALCIUM CHLORIDE: 600; 310; 30; 20 INJECTION, SOLUTION INTRAVENOUS at 02:01

## 2025-01-16 RX ADMIN — PROPOFOL 160 MG: 10 INJECTION, EMULSION INTRAVENOUS at 07:01

## 2025-01-16 RX ADMIN — VECURONIUM BROMIDE 2 MG: 10 INJECTION, POWDER, LYOPHILIZED, FOR SOLUTION INTRAVENOUS at 09:01

## 2025-01-16 RX ADMIN — ACETAMINOPHEN 650 MG: 325 TABLET ORAL at 06:01

## 2025-01-16 RX ADMIN — EPHEDRINE SULFATE 5 MG: 50 INJECTION, SOLUTION INTRAMUSCULAR; INTRAVENOUS; SUBCUTANEOUS at 10:01

## 2025-01-16 RX ADMIN — OXYCODONE HYDROCHLORIDE 10 MG: 10 TABLET, FILM COATED, EXTENDED RELEASE ORAL at 06:01

## 2025-01-16 RX ADMIN — DEXAMETHASONE SODIUM PHOSPHATE 8 MG: 4 INJECTION, SOLUTION INTRA-ARTICULAR; INTRALESIONAL; INTRAMUSCULAR; INTRAVENOUS; SOFT TISSUE at 07:01

## 2025-01-16 RX ADMIN — ONDANSETRON 4 MG: 2 INJECTION, SOLUTION INTRAMUSCULAR; INTRAVENOUS at 11:01

## 2025-01-16 RX ADMIN — ALBUMIN (HUMAN) 250 ML: 12.5 SOLUTION INTRAVENOUS at 07:01

## 2025-01-16 RX ADMIN — SODIUM CHLORIDE: 0.9 INJECTION, SOLUTION INTRAVENOUS at 07:01

## 2025-01-16 RX ADMIN — HYDROMORPHONE HYDROCHLORIDE 0.5 MG: 2 INJECTION INTRAMUSCULAR; INTRAVENOUS; SUBCUTANEOUS at 12:01

## 2025-01-16 RX ADMIN — EPHEDRINE SULFATE 15 MG: 50 INJECTION, SOLUTION INTRAMUSCULAR; INTRAVENOUS; SUBCUTANEOUS at 11:01

## 2025-01-16 RX ADMIN — VECURONIUM BROMIDE 1 MG: 10 INJECTION, POWDER, LYOPHILIZED, FOR SOLUTION INTRAVENOUS at 10:01

## 2025-01-16 RX ADMIN — LIDOCAINE HYDROCHLORIDE 100 MG: 20 INJECTION, SOLUTION INTRAVENOUS at 07:01

## 2025-01-16 RX ADMIN — ROCURONIUM BROMIDE 50 MG: 10 INJECTION, SOLUTION INTRAVENOUS at 07:01

## 2025-01-16 RX ADMIN — CELECOXIB 200 MG: 100 CAPSULE ORAL at 06:01

## 2025-01-16 RX ADMIN — OXYCODONE 10 MG: 5 TABLET ORAL at 12:01

## 2025-01-16 RX ADMIN — CYCLOBENZAPRINE 10 MG: 10 TABLET, FILM COATED ORAL at 06:01

## 2025-01-16 RX ADMIN — OXYCODONE 10 MG: 5 TABLET ORAL at 06:01

## 2025-01-16 RX ADMIN — SUGAMMADEX 200 MG: 100 INJECTION, SOLUTION INTRAVENOUS at 11:01

## 2025-01-16 RX ADMIN — PHENYLEPHRINE HYDROCHLORIDE 25 MCG/MIN: 10 INJECTION INTRAVENOUS at 08:01

## 2025-01-16 RX ADMIN — GLYCOPYRROLATE 0.2 MG: 0.2 INJECTION, SOLUTION INTRAMUSCULAR; INTRAVITREAL at 07:01

## 2025-01-16 RX ADMIN — HYDROMORPHONE HYDROCHLORIDE 0.4 MG: 2 INJECTION INTRAMUSCULAR; INTRAVENOUS; SUBCUTANEOUS at 11:01

## 2025-01-16 RX ADMIN — ONDANSETRON 4 MG: 2 INJECTION, SOLUTION INTRAMUSCULAR; INTRAVENOUS at 07:01

## 2025-01-16 RX ADMIN — FENTANYL CITRATE 50 MCG: 50 INJECTION INTRAMUSCULAR; INTRAVENOUS at 08:01

## 2025-01-16 RX ADMIN — HEPARIN SODIUM 5000 UNITS: 5000 INJECTION INTRAVENOUS; SUBCUTANEOUS at 09:01

## 2025-01-16 RX ADMIN — MUPIROCIN: 20 OINTMENT TOPICAL at 09:01

## 2025-01-16 RX ADMIN — EPHEDRINE SULFATE 5 MG: 50 INJECTION, SOLUTION INTRAMUSCULAR; INTRAVENOUS; SUBCUTANEOUS at 08:01

## 2025-01-16 RX ADMIN — PREGABALIN 75 MG: 75 CAPSULE ORAL at 06:01

## 2025-01-16 RX ADMIN — CELECOXIB 200 MG: 100 CAPSULE ORAL at 09:01

## 2025-01-16 NOTE — H&P
NEUROSURGICAL OUTPATIENT CONSULTATION NOTE     DATE OF SERVICE:  01/16/2025       ATTENDING PHYSICIAN:  Clemente Lindquist MD     CONSULT REQUESTED BY:  Dr Sullivan     REASON FOR CONSULT:  Bilateral leg pain, difficulty walking     MIR 34%     SUBJECTIVE:     HISTORY:  This is a 74 y.o. male who has been complaining of worsening difficulty ambulating, standing upright and bilateral leg pain for about 2 years.  Pain is relieved by sitting down.  He is able to push a lawnmower were but if he is tries to walk normally he has lot of difficulty.  His symptoms have progressively become worse.  He has tried physical therapy and has received 3 epidural steroid injection with partial pain relief.  He has contemplating chiropractic treatments.  No new onset of motor weakness or sphincter dysfunction.  He is also complaining of bilateral hand, fingertips numbness.  Does not report difficulty buttoning shirts.  Has gait imbalance.        PAST MEDICAL HISTORY:       Active Ambulatory Problems     Diagnosis Date Noted    Essential hypertension 03/23/2018    Overweight (BMI 25.0-29.9) 03/23/2018    Arthritis of facet joints at multiple vertebral levels 07/20/2011    High cholesterol 03/23/2018    Elevated TSH 03/23/2018    Unspecified inflammatory spondylopathy, multiple sites in spine 03/12/2020    Gout 03/12/2020    Asthma 03/12/2020    History of adenomatous polyps of colon 05/16/2023    Lumbar stenosis with neurogenic claudication 05/16/2023           Resolved Ambulatory Problems     Diagnosis Date Noted    Pain aggravated by standing 03/02/2023           Past Medical History:   Diagnosis Date    Gout      Hypertension           PAST SURGICAL HISTORY:        Past Surgical History:   Procedure Laterality Date    ADENOIDECTOMY        COLONOSCOPY N/A 5/30/2018     Procedure: COLONOSCOPY;  Surgeon: Srikanth Carver Jr., MD;  Location: [facility];  Service: Endoscopy;  Laterality: N/A;    COLONOSCOPY N/A 9/13/2023     Procedure:  COLONOSCOPY;  Surgeon: Devyn Clemens MD;  Location: [facility] ENDO;  Service: Endoscopy;  Laterality: N/A;    COLONOSCOPY W/ POLYPECTOMY   2018    EYE SURGERY        MOUTH SURGERY   2018    REFRACTIVE SURGERY Right      TONSILLECTOMY        TRANSFORAMINAL EPIDURAL INJECTION OF STEROID Bilateral 3/15/2023     Procedure: Injection,steroid,epidural,transforaminal bilateral L2/3;  Surgeon: Brianda Reilly DO;  Location: [facility] PAIN MGT;  Service: Pain Management;  Laterality: Bilateral;    TRANSFORAMINAL EPIDURAL INJECTION OF STEROID Bilateral 2023     Procedure: bilateral TFESI L3/4;  Surgeon: Sanjeev Rosas MD;  Location: [facility] PAIN MANAGEMENT;  Service: Pain Management;  Laterality: Bilateral;    TRANSFORAMINAL EPIDURAL INJECTION OF STEROID Bilateral 2024     Procedure: LUMBAR TRANSFORAMINAL BILATERAL L3/4 DIRECT REFERRAL;  Surgeon: Sanjeev Rosas MD;  Location: [facility] PAIN MGT;  Service: Pain Management;  Laterality: Bilateral;  [phone]         SOCIAL HISTORY:   Social History            Socioeconomic History    Marital status:    Tobacco Use    Smoking status: Former       Current packs/day: 0.00       Average packs/day: 1 pack/day for 25.0 years (25.0 ttl pk-yrs)       Types: Cigarettes       Start date: 1965       Quit date: 1990       Years since quittin.8    Smokeless tobacco: Former   Substance and Sexual Activity    Alcohol use: Yes       Alcohol/week: 32.0 - 39.0 standard drinks of alcohol       Types: 4 Glasses of wine, 14 - 21 Cans of beer, 14 Shots of liquor per week       Comment: daily    Drug use: No    Sexual activity: Not Currently       Partners: Female      Social Drivers of Health           Financial Resource Strain: Low Risk  (2024)     Overall Financial Resource Strain (CARDIA)      Difficulty of Paying Living Expenses: Not hard at all   Food Insecurity: No Food Insecurity (2024)     Hunger Vital Sign      Worried About Running Out  of Food in the Last Year: Never true      Ran Out of Food in the Last Year: Never true   Transportation Needs: No Transportation Needs (4/30/2024)     PRAPARE - Transportation      Lack of Transportation (Medical): No      Lack of Transportation (Non-Medical): No   Physical Activity: Sufficiently Active (4/30/2024)     Exercise Vital Sign      Days of Exercise per Week: 6 days      Minutes of Exercise per Session: 30 min   Housing Stability: Unknown (4/30/2024)     Housing Stability Vital Sign      Unable to Pay for Housing in the Last Year: No         FAMILY HISTORY:         Family History   Problem Relation Name Age of Onset    Alzheimer's disease Mother        Colon cancer Mother        Stroke Father        No Known Problems Son x4           CURRENTS MEDICATIONS:  Medications Ordered Prior to Encounter          Current Outpatient Medications on File Prior to Visit   Medication Sig Dispense Refill    irbesartan (AVAPRO) 300 MG tablet TAKE 1 TABLET EVERY EVENING 90 tablet 3    LUTEIN/ZEAXANTHIN (OCUVITE LUTEIN 25 ORAL) Take 1 tablet by mouth once daily.        meloxicam (MOBIC) 15 MG tablet Take 1 tablet (15 mg total) by mouth daily as needed for Pain. 90 tablet 3    NIFEdipine (PROCARDIA-XL) 30 MG (OSM) 24 hr tablet TAKE 1 TABLET(30 MG) BY MOUTH DAILY 30 tablet 11      No current facility-administered medications on file prior to visit.            ALLERGIES:  Review of patient's allergies indicates:  No Known Allergies     REVIEW OF SYSTEMS:  Review of Systems   Constitutional:  Negative for diaphoresis, fever and weight loss.   Respiratory:  Negative for shortness of breath.    Cardiovascular:  Negative for chest pain.   Gastrointestinal:  Negative for blood in stool.   Genitourinary:  Negative for hematuria.   Endo/Heme/Allergies:  Does not bruise/bleed easily.   All other systems reviewed and are negative.        OBJECTIVE:     PHYSICAL EXAMINATION:       Vitals:     11/20/24 1121   BP: 135/73   Pulse: 81          Physical Exam:  Vitals reviewed.     Constitutional: He appears well-developed and well-nourished.      Eyes: Pupils are equal, round, and reactive to light. Conjunctivae and EOM are normal.      Cardiovascular: Normal distal pulses and no edema.      Abdominal: Soft.      Skin: Skin displays no rash on trunk and no rash on extremities. Skin displays no lesions on trunk and no lesions on extremities.      Psych/Behavior: He is alert. He is oriented to person, place, and time. He has a normal mood and affect.      Musculoskeletal:        Neck: Range of motion is limited.      Neurological:        DTRs: Tricep reflexes are 2+ on the right side and 2+ on the left side. Bicep reflexes are 2+ on the right side and 2+ on the left side. Brachioradialis reflexes are 2+ on the right side and 2+ on the left side. Patellar reflexes are 2+ on the right side and 2+ on the left side. Achilles reflexes are 2+ on the right side and 2+ on the left side.         Back Exam      Muscle Strength   Right Quadriceps:  5/5   Left Quadriceps:  5/5   Right Hamstrings:  5/5   Left Hamstrings:  5/5                     Neurological Exam  Mental Status  Alert. Oriented to person, place, and time. Speech is normal.     Cranial Nerves  CN III, IV, VI: Extraocular movements intact bilaterally. Pupils equal round and reactive to light bilaterally.     Motor   Normal muscle tone.                                                Right                     Left  Deltoid                                   5                          5   Biceps                                   5                          5   Triceps                                  5                          5   Interossei                              5                          5   Iliopsoas                               5                          5   Quadriceps                           5                          5   Hamstring                             5                          5    Gastrocnemius                     5                           5   Anterior tibialis                      5                          5   Posterior tibialis                    5                          5   Peroneal                               5                          5     Sensory  Light touch is normal in upper and lower extremities. Pinprick is normal in upper and lower extremities.      Reflexes                                            Right                      Left  Brachioradialis                    2+                         2+  Biceps                                 2+                         2+  Triceps                                2+                         2+  Patellar                                2+                         2+  Achilles                                2+                         2+  Right Plantar: normal  Left Plantar: normal     Right pathological reflexes: Elba's absent. Ankle clonus absent.  Left pathological reflexes: Elba's absent. Ankle clonus absent.           DIAGNOSTIC DATA:  I personally interpreted the following imaging:   Lumbar spine MRI reviewed showing foraminal stenosis, worse at L3-4 and L4-5 bilaterally, severe, moderate-to-severe central canal stenosis at L2-3, L3-4, L4-5.  Loss of lumbar lordosis  Cervical spine MRI reviewed showing moderate central canal stenosis at C4-5 at C5-6, no intramedullary signal change  Scoliosis film reviewed showing loss of lumbar lordosis, no significant sagittal plane imbalance        ASSESSMENT:  This is a 74 y.o. male with      Problem List Items Addressed This Visit                  Neuro     Lumbar stenosis with neurogenic claudication (Chronic)      Other Visit Diagnoses         Foraminal stenosis of lumbar region    -  Primary     Cervical spondylosis with myelopathy              The patient appears to have only mild myelopathy symptoms at this time     PLAN:  I explained the natural history of the disease and all  treatment options. I recommended a L2-3, L3-4, L4-5 oblique interbody fusion, placement of interbody spacers, DePuy Conduit LLIF cages filled with allograft BMP and DBM, posterior segmental instrumentation using DePuy Viper Prime system from L2-L5.  The goals of the surgery is to indirectly decompress the severe foraminal stenosis and central canal stenosis, improve lumbar lordosis and achieve long-term arthrodesis.  The goals of the surgery is to improve the patient's neurogenic claudication and radiculopathy symptoms, ability to stand and walk, improvement in functional status.  Regarding his mild myelopathy symptoms we are going to continue to observe at this time.  Patient was instructed on myelopathy symptoms including gait imbalance, worsening hand function and sphincter dysfunction symptoms.     Patient has contemplating surgical intervention at this time.  He will reach out to us on the portal when he is ready to schedule the surgery.     All questions answered.       We have discussed the risks of surgery including death, coma, bleeding, infection, failure of surgery, CSF leak, nerve root injury, spinal cord injury, ureter injury, weakness, paralysis, peripheral neuropathy, malplaced hardware, migration of hardware, non-union, need for reoperation.              Clemente Lindquist MD  Cell:[phone]

## 2025-01-16 NOTE — PLAN OF CARE
VN cued into pt's room for introduction with pt's permission.  VN role explained and informed pt that VN would be working with bedside nurse and the rest of the care team.  Fall risk and bed alarm protocol education provided.  Instructed pt to call for assistance and agreeable.  Allowed time for questions. NAD noted.  Will cont to be available as needed.      01/16/25 1430   Admission   Initial VN Admission Questions Complete   Communication Issues? None   Shift   Virtual Nurse - Patient Verbalized Approval Of Camera Use;VN Rounding   Safety/Activity   Patient Rounds call light in patient/parent reach;visualized patient;clutter free environment maintained   Safety Promotion/Fall Prevention Fall Risk reviewed with patient/family;instructed to call staff for mobility   Pain/Comfort/Sleep   Preferred Pain Scale number (Numeric Rating Pain Scale)   Pain Body Location - Orientation lower   Pain Body Location back   Pain Rating (0-10): Rest 7

## 2025-01-16 NOTE — OP NOTE
Date of surgery 01/16/2025    Preop diagnosis   1. Spinal stenosis with neurogenic claudication   2. Foraminal stenosis lumbar spine with radiculopathy    Postop diagnosis   Same    Surgery   1. Left L2-3, L3-4, L4-5 oblique interbody fusion, placement of interbody spacers, DePuy Conduit cages filled with allograft BMP and DBM  2. L2-L5 posterior segmental instrumentation using DePuy Viper Prime system  3. Registration of navigated instrumentation including intraoperative 3D imaging [facility] and [facility] station    Surgeon   Clemente Lindquist MD    Indication   This is a 74 y.o. male who has been complaining of worsening difficulty ambulating, standing upright and bilateral leg pain for about 2 years. Pain is relieved by sitting down. He is able to push a lawnmower were but if he is tries to walk normally he has lot of difficulty. His symptoms have progressively become worse. He has tried physical therapy and has received 3 epidural steroid injection with partial pain relief. He has contemplating chiropractic treatments. No new onset of motor weakness or sphincter dysfunction. He is also complaining of bilateral hand, fingertips numbness. Does not report difficulty buttoning shirts. Has gait imbalance.     Procedure   The patient was intubated under general anesthesia and positioned in lateral decubitus, left side up on a radiolucent table.  All pressure points were carefully padded.  An axillary roll was placed and the patient was securely taped on the table.  The left flank and abdominal area was prepped and draped in a typical sterile fashion.  Using fluoroscopy we planned 3 incision anterolaterally in line with the L2-3, L3-4, L4-5 disc spaces.  We started at L4-5.  Local anesthesia with 1% lidocaine with epi.  The skin was incised and hemostasis was carried out.  The external oblique muscle fascia was divided sharply.  Blunt dissection through the muscle wall down to the retroperitoneal space.  A quarter was created  in front of the psoas muscle, using serial dilation we placed a 3 blade retractor in front of the L4-5 disc space and psoas muscle.  Under AP and lateral fluoroscopy we divided the ipsilateral and contralateral annulus, dilated the space with serial trials, removed all disc material, prepared the endplates a finally placed a interbody spacer measuring 22 mm by 14 mm by 55 mm of length with 8° of lordosis.  The spacer was filled with allograft BMP and DBM.  The exact same procedure was done at L3-4 with a 22 mm by 12 mm x 55 mm of length with 8° of lordosis spacer also filled with allograft BMP and DBM.  The exact same procedure was done at L2-3 with a 18 mm by 10 mm x 50 mm of length with 8° of lordosis spacer filled with allograft DBM and BMP.  Good placement of all 3 spacers was confirmed with fluoroscopy.  Good indirect decompression of the nerve roots was obtained.  Irrigation hemostasis.  The muscular fascia was closed with interrupted 0 Vicryl.  The dermal layer was with inverted interrupted 3-0 Vicryl.  The skin was closed with staples.  The incision was dressed.    The patient was then flipped prone on the Anthony table.  All pressure points were carefully padded.  The lumbosacral area was prepped and draped in a typical sterile fashion.  Two array pins were inserted in the right PSIS and our navigation array.  A 3D spin imaging was performed and the reconstructed images were transferred to the [facility] station.  We used the pointer to confirm accuracy.  We then registered our navigated drill guide at 25 mm of depth and 2 pedicle screwdrivers.  Using the pointer we planned bilateral paramedian incisions from L2-L5.  Local anesthesia with 1% lidocaine with epi.  The skin was incised and hemostasis was carried out.  The muscular fascia was divided sharply.  Blunt dissection through the para axial muscles.  Using the navigated drill guide we cannulated the pedicles of L2, L3, L4, L5 bilaterally and K-wires were  inserted.  Over the K-wire we advanced pedicle screws.  At L2 we placed 6.0 x 55 mm screws, at L3, L4 and L5 we placed 6.0 x 50 mm screws bilaterally.  The placement of all the screws was confirmed with fluoroscopy.  We then reduced precontoured titanium rods over the screw tulips with caps.  All caps were torqued.  The tower tabs were snapped.  Irrigation hemostasis.  The muscular fascia was closed with interrupted 0 Vicryl.  The dermal layer was closed with inverted interrupted 2-0 Vicryl.  The skin was closed with staples.  No complications.  Blood loss was estimated at 75 cc.

## 2025-01-16 NOTE — PLAN OF CARE
Problem: Physical Therapy  Goal: Physical Therapy Goal  Description: Goals to be met by: 25     Patient will increase functional independence with mobility by performin. Supine to sit with Modified Victoria  2. Sit to supine with Modified Victoria  3. Sit to stand transfer with Modified Victoria  4. Bed to chair transfer with Modified Victoria using Rolling Walker  5. Gait  x 100 feet with Modified Victoria using Rolling Walker.   6. Ascend/Descend 4 inch curb step with Contact Guard Assistance using Rolling Walker.    Outcome: Progressing     PT Eval completed, note to follow.     Pt transferred supine <> sit with Antonio and sit <> stands with CGA-SBA and RW. Pt able to take steps forward/backward but limited by dizziness. Pt noted to be orthostatic and symptomatic. Pt educated on log rolling, use of TLSO brace, and spinal precautions. D/c recs TBD pending progress. Will progress pt as able - anticipate good progression.     BP taken as follows:   BP   Seated 142/71   Standing 109/68

## 2025-01-16 NOTE — PT/OT/SLP EVAL
Occupational Therapy   Evaluation    Name: Octavio Constantino  MRN: [id number]  Admitting Diagnosis: Lumbar stenosis with neurogenic claudication  Recent Surgery: Procedure(s) (LRB):  FUSION, SPINE, WITH INSTRUMENTATION (N/A)  FUSION, SPINE, LUMBAR (N/A) Day of Surgery    Recommendations:     Discharge Recommendations:  (TBD)  Discharge Equipment Recommendations:  other (see comments) (TBD)  Barriers to discharge:  Other (Comment) (Pt requires increased level of assist)    Assessment:     Octavio Constantino is a 74 y.o. male with a medical diagnosis of Lumbar stenosis with neurogenic claudication.  He presents with The primary encounter diagnosis was Foraminal stenosis of lumbar region. Diagnoses of Essential hypertension and Lumbar stenosis with neurogenic claudication were also pertinent to this visit. Performance deficits affecting function: weakness, impaired functional mobility, gait instability, impaired endurance, impaired self care skills, impaired balance, impaired cardiopulmonary response to activity, pain, impaired skin, orthopedic precautions.      Rehab Prognosis: Good; patient would benefit from acute skilled OT services to address these deficits and reach maximum level of function.       Plan:     Patient to be seen 5 x/week to address the above listed problems via self-care/home management, therapeutic activities, therapeutic exercises  Plan of Care Expires: 02/16/25  Plan of Care Reviewed with: patient, son    Subjective     Chief Complaint: pain, dizziness OOB  Patient/Family Comments/goals: return to PLOF    Occupational Profile:  Pt lives with son in a 2  with 1 NEGRA and no rails, WIS  Prior to admission, patients level of function was Independent, driving, and no falls.  Equipment used at home: none.  DME owned (not currently used): none.  Upon discharge, patient will have assistance from son.    Pain/Comfort:  Pain Rating 1: 7/10  Location 1: back  Pain Addressed 1: Pre-medicate for activity,  Reposition, Distraction, Cessation of Activity, Nurse notified  Pain Rating Post-Intervention 1: 7/10    Patients cultural, spiritual, Cheondoism conflicts given the current situation: no    Objective:     Communicated with: nsg prior to session.  Patient found HOB elevated with peripheral IV, bed alarm upon OT entry to room.    General Precautions: Standard, fall  Orthopedic Precautions: spinal precautions  Braces: TLSO  Respiratory Status: Room air    Occupational Performance:    Bed Mobility:    Patient completed Rolling/Turning to Right with minimum assistance  Patient completed Scooting/Bridging with contact guard assistance  Patient completed Supine to Sit with minimum assistance  Patient completed Sit to Supine with minimum assistance    Functional Mobility/Transfers:  Patient completed Sit <> Stand Transfer with stand by assistance and contact guard assistance  with  rolling walker   Functional Mobility: Pt taking ~3-4 steps forwards then c/o dizziness, stepping back to bed & returning to seated for safety.     Activities of Daily Living:  [place] to rajesh TLSO brace with increased v/cs for proper technique to adjust    Cognitive/Visual Perceptual:  Cognitive/Psychosocial Skills:     -       Oriented to: Person, Place, Time, and Situation   -       Mood/Affect/Coping skills/emotional control: Cooperative and Pleasant    Physical Exam:  Upper Extremity Range of Motion:     -       Right Upper Extremity: WFL  -       Left Upper Extremity: WFL  Upper Extremity Strength:    -       Right Upper Extremity: WFL  -       Left Upper Extremity: WFL   Strength:    -       Right Upper Extremity: WFL  -       Left Upper Extremity: WFL    AMPAC 6 Click ADL:  AMPAC Total Score: 20    Treatment & Education:  Pt would benefit from cont OT services in order to maximize functional independence.   Pt limited by orthostatic & symptomatic this date in standing.   Pt educated on spinal precautions & wearing/adjustment of TLSO  brace.   Pt encouraged to sit with HOB elevated as tolerated.   Will progress as able.     BP taken as follows:    BP   Seated 142/71   Standing 109/68       Patient left HOB elevated with all lines intact, call button in reach, bed alarm on, nsg notified, and son present    GOALS:   Multidisciplinary Problems       Occupational Therapy Goals          Problem: Occupational Therapy    Goal Priority Disciplines Outcome Interventions   Occupational Therapy Goal     OT, PT/OT Progressing    Description: Goals to be met by: 02/16/2025     Patient will increase functional independence with ADLs by performing:    Toileting from toilet with Supervision for hygiene and clothing management.   Supine to sit with Modified [place].  Step transfer with Supervision  Toilet transfer to toilet with Supervision.                       History:     Past Medical History:   Diagnosis Date    Asthma     Gout     Hypertension     Lumbar stenosis with neurogenic claudication 5/16/2023         Past Surgical History:   Procedure Laterality Date    ADENOIDECTOMY      COLONOSCOPY N/A 5/30/2018    Procedure: COLONOSCOPY;  Surgeon: Srikanth Carver Jr., MD;  Location: [facility] ENDO;  Service: Endoscopy;  Laterality: N/A;    COLONOSCOPY N/A 9/13/2023    Procedure: COLONOSCOPY;  Surgeon: Devyn Clemens MD;  Location: [facility] ENDO;  Service: Endoscopy;  Laterality: N/A;    COLONOSCOPY W/ POLYPECTOMY  05/30/2018    EYE SURGERY      MOUTH SURGERY  02/2018    REFRACTIVE SURGERY Right     TONSILLECTOMY      TRANSFORAMINAL EPIDURAL INJECTION OF STEROID Bilateral 3/15/2023    Procedure: Injection,steroid,epidural,transforaminal bilateral L2/3;  Surgeon: Brianda Reilly DO;  Location: [facility] PAIN MGT;  Service: Pain Management;  Laterality: Bilateral;    TRANSFORAMINAL EPIDURAL INJECTION OF STEROID Bilateral 8/9/2023    Procedure: bilateral TFESI L3/4;  Surgeon: Sanjeev Rosas MD;  Location: [facility] PAIN MANAGEMENT;  Service: Pain Management;   Laterality: Bilateral;    TRANSFORAMINAL EPIDURAL INJECTION OF STEROID Bilateral 7/18/2024    Procedure: LUMBAR TRANSFORAMINAL BILATERAL L3/4 DIRECT REFERRAL;  Surgeon: Sanjeev Rosas MD;  Location: [facility];  Service: Pain Management;  Laterality: Bilateral;  [phone]       Time Tracking:     OT Date of Treatment: 01/16/25  OT Start Time: 1440  OT Stop Time: 1506  OT Total Time (min): 26 min    Billable Minutes:Evaluation 10  Therapeutic Activity 16    1/16/2025

## 2025-01-16 NOTE — ANESTHESIA PROCEDURE NOTES
Intubation    Date/Time: 1/16/2025 7:26 AM    Performed by: Kimberly Ocampo  Authorized by: Sameer Bliss MD    Intubation:     Intubated:  Postinduction    Mask Ventilation:  Easy with oral airway    Attempts:  1    Attempted By:  Student    Method of Intubation:  Video laryngoscopy    Blade:  Simon 3    Laryngeal View Grade: Grade I - full view of cords      Difficult Airway Encountered?: No      Complications:  None    Airway Device:  Oral endotracheal tube    Airway Device Size:  7.5    Style/Cuff Inflation:  Cuffed    Tube secured:  23    Secured at:  The lips    Placement Verified By:  Capnometry    Complicating Factors:  None    Findings Post-Intubation:  BS equal bilateral and atraumatic/condition of teeth unchanged

## 2025-01-16 NOTE — PLAN OF CARE
Pt would benefit from cont OT services in order to maximize functional independence. Recommending TBD pending progress; pt limited by orthostatic & symptomatic this date in standing. Pt educated on spinal precautions & wearing/adjustment of TLSO brace. Will progress as able.     Problem: Occupational Therapy  Goal: Occupational Therapy Goal  Description: Goals to be met by: 02/16/2025     Patient will increase functional independence with ADLs by performing:    Toileting from toilet with Supervision for hygiene and clothing management.   Supine to sit with Modified [place].  Step transfer with Supervision  Toilet transfer to toilet with Supervision.    Outcome: Progressing

## 2025-01-16 NOTE — ANESTHESIA PREPROCEDURE EVALUATION
01/16/2025  Octavio Constantino is a 74 y.o., male.      Pre-op Assessment    I have reviewed the Patient Summary Reports.     I have reviewed the Nursing Notes. I have reviewed the NPO Status.   I have reviewed the Medications.     Review of Systems  Anesthesia Hx:  No problems with previous Anesthesia               Denies Personal Hx of Anesthesia complications.                    Social:  Non-Smoker       Cardiovascular:     Hypertension           hyperlipidemia                               Pulmonary:    Asthma asymptomatic                   Renal/:  Renal/ Normal                 Hepatic/GI:  Hepatic/GI Normal                    Musculoskeletal:         Spine Disorders: lumbar            Neurological:  Neurology Normal                                      Endocrine:  Endocrine Normal                Physical Exam  General: Cooperative, Alert and Oriented    Airway:  Mallampati: III   Mouth Opening: Normal  TM Distance: Normal  Tongue: Normal  Neck ROM: Normal ROM    Dental:  Intact        Anesthesia Plan  Type of Anesthesia, risks & benefits discussed:    Anesthesia Type: Gen ETT  Intra-op Monitoring Plan: Standard ASA Monitors and Art Line  Post Op Pain Control Plan: multimodal analgesia and IV/PO Opioids PRN  Induction:  IV  Informed Consent: Informed consent signed with the Patient and all parties understand the risks and agree with anesthesia plan.  All questions answered.   ASA Score: 2  Day of Surgery Review of History & Physical: H&P Update referred to the surgeon/provider.    Ready For Surgery From Anesthesia Perspective.     .

## 2025-01-16 NOTE — ANESTHESIA PROCEDURE NOTES
Intubation    Date/Time: 1/16/2025 7:26 AM    Performed by: Dayanara Pacheco CRNA  Authorized by: Sameer Bliss MD    Intubation:     Induction:  Intravenous    Intubated:  Postinduction    Mask Ventilation:  Easy mask    Attempts:  1    Attempted By:  CRNA    Method of Intubation:  Video laryngoscopy    Blade:  Simon 3    Laryngeal View Grade: Grade I - full view of cords      Difficult Airway Encountered?: No      Complications:  None    Airway Device:  Oral endotracheal tube    Airway Device Size:  7.5    Style/Cuff Inflation:  Cuffed (inflated to minimal occlusive pressure)    Tube secured:  22    Secured at:  The lips    Placement Verified By:  Capnometry    Complicating Factors:  None    Findings Post-Intubation:  BS equal bilateral and atraumatic/condition of teeth unchanged

## 2025-01-16 NOTE — PT/OT/SLP EVAL
Physical Therapy Evaluation    Patient Name:  Octavio Constantino   MRN:  [id number]    Recommendations:     Discharge Recommendations:  (TBD)   Discharge Equipment Recommendations:  (TBD)   Barriers to discharge:  orthostatic hypotension    Assessment:     Octavio Constantino is a 74 y.o. male admitted with a medical diagnosis of Lumbar stenosis with neurogenic claudication.  He presents with the following impairments/functional limitations: impaired endurance, gait instability, impaired functional mobility, impaired balance, impaired sensation, impaired self care skills, orthopedic precautions, impaired skin, decreased ROM, weakness, impaired cardiopulmonary response to activity, impaired joint extensibility .Pt transferred supine <> sit with Antonio and sit <> stands with CGA-SBA and RW. Pt able to take steps forward/backward but limited by dizziness. Pt noted to be orthostatic and symptomatic. Pt educated on log rolling, use of TLSO brace, and spinal precautions. D/c recs TBD pending progress. Will progress pt as able - anticipate good progression.     Rehab Prognosis: Good; patient would benefit from acute skilled PT services to address these deficits and reach maximum level of function.    Recent Surgery: Procedure(s) (LRB):  FUSION, SPINE, WITH INSTRUMENTATION (N/A)  FUSION, SPINE, LUMBAR (N/A) Day of Surgery    Plan:     During this hospitalization, patient to be seen daily to address the identified rehab impairments via gait training, therapeutic activities, therapeutic exercises, neuromuscular re-education and progress toward the following goals:    Plan of Care Expires:  02/16/25    Subjective     Chief Complaint: post-op pain  Patient/Family Comments/goals: improve function and pain  Pain/Comfort:  Pain Rating 1: 7/10  Location - Orientation 1: generalized  Location 1: back (incisional)  Pain Addressed 1: Reposition, Distraction, Pre-medicate for activity, Cessation of Activity, Nurse notified (declined ice  pack)  Pain Rating Post-Intervention 1: 7/10    Patients cultural, spiritual, Mosque conflicts given the current situation: no    Living Environment:  Pt lives with son in a 2 SH with 1 NEGRA and no rails, WIS  Prior to admission, patients level of function was Independent, driving, and no falls.  Equipment used at home: none.  DME owned (not currently used): none.  Upon discharge, patient will have assistance from son.    Objective:     Communicated with nsg prior to session.  Patient found HOB elevated with peripheral IV  upon PT entry to room.    General Precautions: Standard, fall  Orthopedic Precautions:spinal precautions   Braces: TLSO  Respiratory Status: Room air    Exams:  Cognitive Exam:  Patient is oriented to Person, Place, Time, and Situation  Postural Exam:  Patient presented with the following abnormalities:    -       Rounded shoulders  Sensation:    -       Impaired  fingers, see OT note  Skin Integrity/Edema:      -       Skin integrity: Wound surgical incisions with dressings and mild strikethrough L flank bandage, nsg notified   -       Edema: None noted BLE  RLE ROM: WFL  RLE Strength: WFL  LLE ROM: WFL  LLE Strength: WFL    Functional Mobility:  Bed Mobility:     Rolling Right: minimum assistance  Scooting: contact guard assistance  Supine to Sit: minimum assistance  Sit to Supine: minimum assistance  Transfers:     Sit to Stand:  stand by assistance and contact guard assistance with rolling walker  Gait: CGA with RW for ~3-4 steps forward/backward/lateral, Vc's for proper use of RW and safe sequencing      AM-PAC 6 CLICK MOBILITY  Total Score:17       Treatment & Education:  Pt educated on role of PT/POC and pt agreeable to participate in therapy session  Pt performed 3 sit <> stands with RW and CGA-SBA, VC's for hand placement  Functional mobility limited by dizziness  Pt noted to be orthostatic and symptomatic.   Pt educated on log rolling, use of TLSO brace, and spinal precautions.  BP  taken as follows:    BP   Seated 142/71   Standing 109/68    pt able to take steps as above  Assisted and educated pt on donning/doffing TLSO brace  Pt returned supine and SCDs replaced    Patient left HOB elevated with all lines intact, call button in reach, bed alarm on, nsg notified, and son present.    GOALS:   Multidisciplinary Problems       Physical Therapy Goals          Problem: Physical Therapy    Goal Priority Disciplines Outcome Interventions   Physical Therapy Goal     PT, PT/OT Progressing    Description: Goals to be met by: 25     Patient will increase functional independence with mobility by performin. Supine to sit with Modified [place]  2. Sit to supine with Modified [place]  3. Sit to stand transfer with Modified [place]  4. Bed to chair transfer with Modified [place] using Rolling Walker  5. Gait  x 100 feet with Modified [place] using Rolling Walker.   6. Ascend/Descend 4 inch curb step with Contact Guard Assistance using Rolling Walker.                         DME Justifications:  TBD    History:     Past Medical History:   Diagnosis Date    Asthma     Gout     Hypertension     Lumbar stenosis with neurogenic claudication 2023       Past Surgical History:   Procedure Laterality Date    ADENOIDECTOMY      COLONOSCOPY N/A 2018    Procedure: COLONOSCOPY;  Surgeon: Srikanth Carver Jr., MD;  Location: [facility] ENDO;  Service: Endoscopy;  Laterality: N/A;    COLONOSCOPY N/A 2023    Procedure: COLONOSCOPY;  Surgeon: Devyn Clemens MD;  Location: [facility] ENDO;  Service: Endoscopy;  Laterality: N/A;    COLONOSCOPY W/ POLYPECTOMY  2018    EYE SURGERY      MOUTH SURGERY  2018    REFRACTIVE SURGERY Right     TONSILLECTOMY      TRANSFORAMINAL EPIDURAL INJECTION OF STEROID Bilateral 3/15/2023    Procedure: Injection,steroid,epidural,transforaminal bilateral L2/3;  Surgeon: Brianda Reilly DO;  Location: [facility] PAIN MGT;  Service: Pain  Management;  Laterality: Bilateral;    TRANSFORAMINAL EPIDURAL INJECTION OF STEROID Bilateral 8/9/2023    Procedure: bilateral TFESI L3/4;  Surgeon: Sanjeev Rosas MD;  [facility] PAIN MANAGEMENT;  Service: Pain Management;  Laterality: Bilateral;    TRANSFORAMINAL EPIDURAL INJECTION OF STEROID Bilateral 7/18/2024    Procedure: LUMBAR TRANSFORAMINAL BILATERAL L3/4 DIRECT REFERRAL;  Surgeon: Sanjeev Rosas MD;  Location: [facility] PAIN MGT;  Service: Pain Management;  Laterality: Bilateral;  [phone]       Time Tracking:     PT Received On: 01/16/25  PT Start Time: 1440     PT Stop Time: 1505  PT Total Time (min): 25 min co-eval with OT    Billable Minutes: Evaluation 9, Gait Training 8, and Therapeutic Activity 8      01/16/2025

## 2025-01-16 NOTE — TRANSFER OF CARE
"Anesthesia Transfer of Care Note    Patient: Octavio Constantino    Procedure(s) Performed: Procedure(s) (LRB):  FUSION, SPINE, WITH INSTRUMENTATION (N/A)  FUSION, SPINE, LUMBAR (N/A)    Patient location: PACU    Anesthesia Type: general    Transport from OR: Transported from OR on 6-10 L/min O2 by face mask with adequate spontaneous ventilation    Post pain: adequate analgesia    Post assessment: no apparent anesthetic complications    Post vital signs: stable    Level of consciousness: responds to stimulation    Nausea/Vomiting: no nausea/vomiting    Complications: none    Transfer of care protocol was followed      Last vitals: Visit Vitals  BP (!) 109/53 (BP Location: Right arm, Patient Position: Lying)   Pulse 83   Temp 97.7   Resp 16   Ht 5' 9" (1.753 m)   Wt 86.2 kg (190 lb)   SpO2 98%   BMI 28.06 kg/m²     "

## 2025-01-17 LAB
ANION GAP SERPL CALC-SCNC: 11 MMOL/L (ref 8–16)
BASOPHILS # BLD AUTO: 0.01 K/UL (ref 0–0.2)
BASOPHILS NFR BLD: 0.1 % (ref 0–1.9)
BUN SERPL-MCNC: 10 MG/DL (ref 8–23)
CALCIUM SERPL-MCNC: 8.3 MG/DL (ref 8.7–10.5)
CHLORIDE SERPL-SCNC: 105 MMOL/L (ref 95–110)
CO2 SERPL-SCNC: 22 MMOL/L (ref 23–29)
CREAT SERPL-MCNC: 0.7 MG/DL (ref 0.5–1.4)
DIFFERENTIAL METHOD BLD: ABNORMAL
EOSINOPHIL # BLD AUTO: 0 K/UL (ref 0–0.5)
EOSINOPHIL NFR BLD: 0 % (ref 0–8)
ERYTHROCYTE [DISTWIDTH] IN BLOOD BY AUTOMATED COUNT: 13.9 % (ref 11.5–14.5)
EST. GFR  (NO RACE VARIABLE): >60 ML/MIN/1.73 M^2
GLUCOSE SERPL-MCNC: 119 MG/DL (ref 70–110)
HCT VFR BLD AUTO: 37 % (ref 40–54)
HGB BLD-MCNC: 12.3 G/DL (ref 14–18)
IMM GRANULOCYTES # BLD AUTO: 0.04 K/UL (ref 0–0.04)
IMM GRANULOCYTES NFR BLD AUTO: 0.4 % (ref 0–0.5)
LYMPHOCYTES # BLD AUTO: 1.7 K/UL (ref 1–4.8)
LYMPHOCYTES NFR BLD: 16.9 % (ref 18–48)
MCH RBC QN AUTO: 30.4 PG (ref 27–31)
MCHC RBC AUTO-ENTMCNC: 33.2 G/DL (ref 32–36)
MCV RBC AUTO: 91 FL (ref 82–98)
MONOCYTES # BLD AUTO: 0.7 K/UL (ref 0.3–1)
MONOCYTES NFR BLD: 7.6 % (ref 4–15)
NEUTROPHILS # BLD AUTO: 7.3 K/UL (ref 1.8–7.7)
NEUTROPHILS NFR BLD: 75 % (ref 38–73)
NRBC BLD-RTO: 0 /100 WBC
PLATELET # BLD AUTO: 252 K/UL (ref 150–450)
PMV BLD AUTO: 10.3 FL (ref 9.2–12.9)
POTASSIUM SERPL-SCNC: 4.1 MMOL/L (ref 3.5–5.1)
RBC # BLD AUTO: 4.05 M/UL (ref 4.6–6.2)
SODIUM SERPL-SCNC: 138 MMOL/L (ref 136–145)
WBC # BLD AUTO: 9.77 K/UL (ref 3.9–12.7)

## 2025-01-17 PROCEDURE — 97535 SELF CARE MNGMENT TRAINING: CPT | Mod: CO

## 2025-01-17 PROCEDURE — 36415 COLL VENOUS BLD VENIPUNCTURE: CPT | Performed by: NEUROLOGICAL SURGERY

## 2025-01-17 PROCEDURE — 94799 UNLISTED PULMONARY SVC/PX: CPT | Mod: XB

## 2025-01-17 PROCEDURE — 97116 GAIT TRAINING THERAPY: CPT

## 2025-01-17 PROCEDURE — 97530 THERAPEUTIC ACTIVITIES: CPT

## 2025-01-17 PROCEDURE — 80048 BASIC METABOLIC PNL TOTAL CA: CPT | Performed by: NEUROLOGICAL SURGERY

## 2025-01-17 PROCEDURE — 25000003 PHARM REV CODE 250: Performed by: NEUROLOGICAL SURGERY

## 2025-01-17 PROCEDURE — 99900035 HC TECH TIME PER 15 MIN (STAT)

## 2025-01-17 PROCEDURE — 85025 COMPLETE CBC W/AUTO DIFF WBC: CPT | Performed by: NEUROLOGICAL SURGERY

## 2025-01-17 PROCEDURE — 63600175 PHARM REV CODE 636 W HCPCS: Performed by: NEUROLOGICAL SURGERY

## 2025-01-17 PROCEDURE — 11000001 HC ACUTE MED/SURG PRIVATE ROOM

## 2025-01-17 PROCEDURE — 94761 N-INVAS EAR/PLS OXIMETRY MLT: CPT

## 2025-01-17 RX ADMIN — CELECOXIB 200 MG: 100 CAPSULE ORAL at 08:01

## 2025-01-17 RX ADMIN — MUPIROCIN: 20 OINTMENT TOPICAL at 08:01

## 2025-01-17 RX ADMIN — ACETAMINOPHEN 650 MG: 325 TABLET ORAL at 05:01

## 2025-01-17 RX ADMIN — ACETAMINOPHEN 650 MG: 325 TABLET ORAL at 10:01

## 2025-01-17 RX ADMIN — HEPARIN SODIUM 5000 UNITS: 5000 INJECTION INTRAVENOUS; SUBCUTANEOUS at 08:01

## 2025-01-17 RX ADMIN — SENNOSIDES AND DOCUSATE SODIUM 2 TABLET: 8.6; 5 TABLET ORAL at 10:01

## 2025-01-17 RX ADMIN — OXYCODONE 10 MG: 5 TABLET ORAL at 10:01

## 2025-01-17 RX ADMIN — NIFEDIPINE 30 MG: 30 TABLET, FILM COATED, EXTENDED RELEASE ORAL at 08:01

## 2025-01-17 NOTE — PLAN OF CARE
Problem: Occupational Therapy  Goal: Occupational Therapy Goal  Description: Goals to be met by: 02/16/2025     Patient will increase functional independence with ADLs by performing:    Toileting from toilet with Supervision for hygiene and clothing management.   Supine to sit with Modified [place].  Step transfer with Supervision  Toilet transfer to toilet with Supervision.    Outcome: Progressing   Octavio Constantino is a 74 y.o. male with a medical diagnosis of Lumbar stenosis with neurogenic claudication.   Performance deficits affecting function are weakness, impaired endurance, impaired self care skills, impaired functional mobility, gait instability, impaired balance, decreased lower extremity function, decreased safety awareness, pain, decreased ROM, impaired skin, edema, orthopedic precautions.    Pt found in chair, agreeable to therapy.  Pt is progressing well towards goals. Continue OT services to address functional goals, progressing as able.

## 2025-01-17 NOTE — PLAN OF CARE
Problem: Adult Inpatient Plan of Care  Goal: Plan of Care Review  Outcome: Progressing     Problem: Wound  Goal: Optimal Coping  Outcome: Progressing     Problem: Infection  Goal: Absence of Infection Signs and Symptoms  Outcome: Progressing     Problem: Spinal Surgery  Goal: Optimal Coping with Surgery  Outcome: Progressing     Problem: Fall Injury Risk  Goal: Absence of Fall and Fall-Related Injury  Outcome: Progressing    Pt is AAOx4 with complaints of pain with movement. Oxy given prn. Patient worked with PT/OT and has been up walking and in the chair this shift. Pt had xrays done. Pt is wearing LSO brace when up.

## 2025-01-17 NOTE — ANESTHESIA POSTPROCEDURE EVALUATION
Anesthesia Post Evaluation    Patient: Octavio Constantino    Procedure(s) Performed: Procedure(s) (LRB):  FUSION, SPINE, WITH INSTRUMENTATION (N/A)  FUSION, SPINE, LUMBAR (N/A)    Final Anesthesia Type: general      Patient location during evaluation: PACU  Patient participation: Yes- Able to Participate  Level of consciousness: awake and alert  Post-procedure vital signs: reviewed and stable  Pain management: adequate  Airway patency: patent    PONV status at discharge: No PONV  Anesthetic complications: no      Cardiovascular status: stable  Respiratory status: room air  Hydration status: euvolemic  Follow-up not needed.              Vitals Value Taken Time   /65 01/17/25 0448   Temp 36.7 °C (98.1 °F) 01/17/25 0448   Pulse 72 01/17/25 0448   Resp 18 01/17/25 0448   SpO2 97 % 01/17/25 0448         Event Time   Out of Recovery 13:23:08         Pain/Blaire Score: Pain Rating Prior to Med Admin: 2 (1/17/2025  5:37 AM)  Pain Rating Post Med Admin: 0 (1/17/2025 12:41 AM)  Blaire Score: 10 (1/16/2025  1:15 PM)

## 2025-01-17 NOTE — PROGRESS NOTES
NEUROSURGICAL POST-OPERATIVE PROGRESS NOTE    DATE OF SERVICE:  01/17/2025      ATTENDING PHYSICIAN:  Clemente Lindquist MD    SUBJECTIVE:    INTERIM HISTORY:    This is a very pleasant 74 y.o. y.o. male, who is status postop day 1 L2-3, L3-4, L4-5 oblique interbody fusion with minimally invasive posterior instrumentation for multilevel severe spinal stenosis with foraminal stenosis.  Patient is doing well.  Denies any new weakness or numbness.  No abdominal pain.  Voiding appropriately.  Pain is well-controlled.  Was able to get up with physical therapy yesterday evening.              OBJECTIVE:    PHYSICAL EXAMINATION:   Vitals:    01/17/25 0448   BP: (!) 148/65   Pulse: 72   Resp: 18   Temp: 98.1 °F (36.7 °C)       Physical Exam:  Vitals reviewed.    Constitutional: He appears well-developed and well-nourished.     Eyes: Pupils are equal, round, and reactive to light. Conjunctivae and EOM are normal.     Cardiovascular: Normal distal pulses and no edema.     Abdominal: Soft.     Skin: Skin displays no rash on trunk and no rash on extremities. Skin displays no lesions on trunk and no lesions on extremities.     Psych/Behavior: He is alert. He is oriented to person, place, and time. He has a normal mood and affect.     Musculoskeletal:        Neck: Range of motion is full.       Ortho Exam    Neurological Exam  Mental Status  Alert. Oriented to person, place, and time.    Cranial Nerves  CN III, IV, VI: Extraocular movements intact bilaterally. Pupils equal round and reactive to light bilaterally.    Motor   Strength is 5/5 throughout all four extremities.          DIAGNOSTIC DATA:    X-ray of the lumbar spine pending    ASSESSMENT:    This is a 74 y.o. male who is s/p L2-3, L3-4, L4-5 oblique interbody fusion with posterior instrumentation.  Doing well.    Problem List Items Addressed This Visit          Neuro    * (Principal) Lumbar stenosis with neurogenic claudication (Chronic)    Relevant Orders    Vital signs     Turn cough deep breathe    Oral Care    Neurovascular checks:  RLE, LLE    Intake and output    Place sequential compression device    Chlorohexidine Bath    Incentive spirometry    Pulse Oximetry Q4H    Incentive spirometry    PT evaluate and treat    OT evaluate and treat    Transfer patient (Completed)    PT assistance with ambulation    Patient must wear orthosis while sitting or standing    LSO brace    Discontinue Wood Catheter now    Diet Adult Regular    CBC auto differential (Completed)    Basic metabolic panel (Completed)    X-Ray Lumbar Spine Ap And Lateral    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    Relevant Orders    Admit to Inpatient (Completed)    Vital signs    Turn cough deep breathe    Oral Care    Neurovascular checks:  RLE, LLE    Intake and output    Place sequential compression device    Chlorohexidine Bath    Incentive spirometry    Pulse Oximetry Q4H    Incentive spirometry    PT evaluate and treat    OT evaluate and treat    Transfer patient (Completed)    PT assistance with ambulation    Patient must wear orthosis while sitting or standing    LSO brace    Discontinue Wood Catheter now    Diet Adult Regular    CBC auto differential (Completed)    Basic metabolic panel (Completed)    X-Ray Lumbar Spine Ap And Lateral       Cardiac/Vascular    Essential hypertension    Relevant Orders    Vital signs    Turn cough deep breathe    Oral Care    Neurovascular checks:  RLE, LLE    Intake and output    Place sequential compression device    Chlorohexidine Bath    Incentive spirometry    Pulse Oximetry Q4H    Incentive spirometry    PT evaluate and treat    OT evaluate and treat    Transfer patient (Completed)    PT assistance with ambulation    Patient must wear orthosis while sitting or standing    LSO brace    Discontinue Wood Catheter now    Diet Adult Regular    CBC auto differential (Completed)    Basic metabolic panel (Completed)    X-Ray Lumbar Spine Ap And Lateral        PLAN:    Continue to mobilize with physical therapy  Possible discharge tomorrow  Postoperative lumbar x-ray  All questions answered        Clemente Lindquist MD  Pager: [phone]

## 2025-01-17 NOTE — PT/OT/SLP PROGRESS
Occupational Therapy   Treatment    Name: Octavio Constantino  MRN: [id number]  Admitting Diagnosis:  Lumbar stenosis with neurogenic claudication  1 Day Post-Op    Recommendations:     Discharge Recommendations: Low Intensity Therapy  Discharge Equipment Recommendations:  walker, rolling, shower chair, bedside commode, hip kit  The mobility limitation cannot be sufficiently resolved by the use of a cane.   Patient's functional mobility deficit can be sufficiently resolved with the use of a rolling walker. Patient's mobility limitation significantly impairs their ability to participate in one of more activities of daily living. The use of a rolling walker will significantly improve the patient's ability to participate in MRADLS and the patient will use it on regular basis in the home.     This patient requires a bedside commode / 3 in 1 commode because they are physically incapable of utilizing their regular toilet facility for a 30-90 day period due to current non-ambulatory status. The patient will be confined to a single room or to one level of the home and there is no toilet on that level, or there are no toilet facilities in the home.        This patient would BENEFIT from a bedside commode, though they are minimally ambulatory, in order to maintain medically prescribed precautions and thus not cause failure of the integrity of their recent surgical intervention   Barriers to discharge:  Increased level of assistance    Assessment:     Octavio Constantino is a 74 y.o. male with a medical diagnosis of Lumbar stenosis with neurogenic claudication.   Performance deficits affecting function are weakness, impaired endurance, impaired self care skills, impaired functional mobility, gait instability, impaired balance, decreased lower extremity function, decreased safety awareness, pain, decreased ROM, impaired skin, edema, orthopedic precautions.    Pt found in chair, agreeable to therapy.  Pt is progressing well  towards goals. Continue OT services to address functional goals, progressing as able.      Rehab Prognosis:  Good; patient would benefit from acute skilled OT services to address these deficits and reach maximum level of function.       Plan:     Patient to be seen 5 x/week to address the above listed problems via self-care/home management, therapeutic activities, therapeutic exercises  Plan of Care Expires: 02/16/25  Plan of Care Reviewed with: patient    Subjective     Chief Complaint: inability to don shoes and perform pericare after toileting  Patient/Family Comments/goals: to go home  Pain/Comfort:  Pain Rating 1: 3/10  Location - Side 1: Bilateral  Location - Orientation 1: lower  Location 1: back  Pain Addressed 1: Pre-medicate for activity, Distraction  Pain Rating Post-Intervention 1: 3/10    Objective:     Communicated with: nurse prior to session.  Patient found up in chair with chair check, peripheral IV upon OT entry to room.    General Precautions: Standard, fall    Orthopedic Precautions:spinal precautions  Braces: TLSO  Respiratory Status: Room air     Occupational Performance:     Activities of Daily Living:  Lower Body Dressing: minimum assistance Pt able to place BLEs into figure 4 position to safely address LB drsg while maintaining spinal precautions.  Pt required increased time and effort.       [facility] 6 Click ADL: 20    Treatment & Education:  Educated pt on:   -home environment modifications to increase safety, reduce risk of fall as well as increase pt independence in home environment   -RW safety/mgmt during transfers and BADL's   -DME recommendations and use of- shower chair, BSC, RW   -LB drsg technique using AE: reacher, sock aid as well as Figure 4 position.    -Spinal precautions and maintaining during BADL's and mobility.  Pt recalls 2/3 spinal precautions.    -use of portable bidet seat and/or toilet paper extended for pericare   -elastic shoe laces for tennis shoes and amber shoe horn     -use of shower chair, hand held shower head and long sponge during shower as well as having supervision/assistance for shower for safety.   Handouts provided for AE.     Patient left up in chair with all lines intact, call button in reach, chair alarm on, and eating lunch     GOALS:   Multidisciplinary Problems       Occupational Therapy Goals          Problem: Occupational Therapy    Goal Priority Disciplines Outcome Interventions   Occupational Therapy Goal     OT, PT/OT Progressing    Description: Goals to be met by: 02/16/2025     Patient will increase functional independence with ADLs by performing:    Toileting from toilet with Supervision for hygiene and clothing management.   Supine to sit with Modified [place].  Step transfer with Supervision  Toilet transfer to toilet with Supervision.                             Time Tracking:     OT Date of Treatment: 01/17/25  OT Start Time: 1145  OT Stop Time: 1214  OT Total Time (min): 29 min    Billable Minutes:Self Care/Home Management 29 1/17/2025   Odomzo Counseling- I discussed with the patient the risks of Odomzo including but not limited to nausea, vomiting, diarrhea, constipation, weight loss, changes in the sense of taste, decreased appetite, muscle spasms, and hair loss.  The patient verbalized understanding of the proper use and possible adverse effects of Odomzo.  All of the patient's questions and concerns were addressed.

## 2025-01-17 NOTE — PT/OT/SLP PROGRESS
Physical Therapy Treatment    Patient Name:  Octavio Constantino   MRN:  [id number]    Recommendations:     Discharge Recommendations: Low Intensity Therapy ( PT/OT)  Discharge Equipment Recommendations: walker, rolling, shower chair, bedside commode, hip kit  Barriers to discharge: None    Assessment:     Octavio Constantino is a 74 y.o. male admitted with a medical diagnosis of Lumbar stenosis with neurogenic claudication.  He presents with the following impairments/functional limitations: weakness, impaired functional mobility, impaired endurance, gait instability, impaired balance, impaired self care skills, decreased lower extremity function, decreased ROM, impaired skin, orthopedic precautions, pain .Pt is progressing well with therapy, no dizziness reported today. Pt ambulated ~120 ft with RW and CGA progressed to SBA. Pt with mild unsteadiness but no LOB. Recommending low intensity therapy upon d/c. DME recs: BSC, RW, shower chair, and hip kit.     The mobility limitation cannot be sufficiently resolved by the use of a cane.   Patient's functional mobility deficit can be sufficiently resolved with the use of a rolling walker. Patient's mobility limitation significantly impairs their ability to participate in one of more activities of daily living. The use of a rolling walker will significantly improve the patient's ability to participate in MRADLS and the patient will use it on regular basis in the home.     This patient requires a bedside commode / 3 in 1 commode because they are physically incapable of utilizing their regular toilet facility for a 30-90 day period due to current non-ambulatory status. The patient will be confined to a single room or to one level of the home and there is no toilet on that level, or there are no toilet facilities in the home.        This patient would BENEFIT from a bedside commode, though they are minimally ambulatory, in order to maintain medically prescribed precautions  and thus not cause failure of the integrity of their recent surgical intervention     Rehab Prognosis: Good; patient would benefit from acute skilled PT services to address these deficits and reach maximum level of function.    Recent Surgery: Procedure(s) (LRB):  FUSION, SPINE, WITH INSTRUMENTATION (N/A)  FUSION, SPINE, LUMBAR (N/A) 1 Day Post-Op    Plan:     During this hospitalization, patient to be seen daily to address the identified rehab impairments via gait training, therapeutic activities, therapeutic exercises, neuromuscular re-education and progress toward the following goals:    Plan of Care Expires:  02/16/25    Subjective     Chief Complaint: post-op pain  Patient/Family Comments/goals: pt expressing concern for ability to perform toileting and dressing tasks, increased pain with ambulating  Pain/Comfort:  Pain Rating 1: 3/10 (6-7/10 with ambulating)  Location - Side 1: Bilateral  Location - Orientation 1: lower  Location 1: back  Pain Addressed 1: Pre-medicate for activity, Reposition, Distraction, Cessation of Activity, Nurse notified  Pain Rating Post-Intervention 1: 3/10      Objective:     Communicated with nsg prior to session.  Patient found HOB elevated with peripheral IV, bed alarm upon PT entry to room.     General Precautions: Standard, fall  Orthopedic Precautions: spinal precautions  Braces: TLSO  Respiratory Status: Room air     Functional Mobility:  Bed Mobility:     Rolling Right: contact guard assistance  Supine to Sit: contact guard assistance and minimum assistance  Transfers:     Sit to Stand:  stand by assistance and contact guard assistance with rolling walker  Toilet Transfer: stand by assistance and contact guard assistance with  rolling walker  using  Step Transfer; VC's for use of GB  Gait: Pt ambulated ~120 ft with RW and CGA progressed to SBA. Pt with mild unsteadiness but no LOB. Vc's for safe / efficient use of RW and increasing proximity to RW, slow  speed/shadi      AM-PAC 6 CLICK MOBILITY  Turning over in bed (including adjusting bedclothes, sheets and blankets)?: 3  Sitting down on and standing up from a chair with arms (e.g., wheelchair, bedside commode, etc.): 3  Moving from lying on back to sitting on the side of the bed?: 3  Moving to and from a bed to a chair (including a wheelchair)?: 3  Need to walk in hospital room?: 3  Climbing 3-5 steps with a railing?: 2  Basic Mobility Total Score: 17       Treatment & Education:  Pt re-educated on spinal precautions, use of TLSO brace, and log rolling technique   Pt able to recall 1/3 precautions  Orthostatics taken and pt denies any dizziness  LE therex performed: 1 x 10 ankle pumps and LAQs BLE  BP taken as follows:   BP   Supine 161/78   Seated 176/83   Standing 148/76     Pt performed G&H at sink with supervision and use of RW for standing balance, educated pt on safe approach to surfaces / toilet / sink with use of RW  Educated pt on DME such as RW, BSC, toilet aide, bidet, and hip kit to improve ability to perform ADL's safely and independently while maintaining spinal precautions   Assist required to rajesh brace, pt able to perform hygiene without assist but with difficulty       Patient left up in chair with all lines intact, call button in reach, chair alarm on, and nsg notified..    GOALS:   Multidisciplinary Problems       Physical Therapy Goals          Problem: Physical Therapy    Goal Priority Disciplines Outcome Interventions   Physical Therapy Goal     PT, PT/OT Progressing    Description: Goals to be met by: 25     Patient will increase functional independence with mobility by performin. Supine to sit with Modified [place]  2. Sit to supine with Modified [place]  3. Sit to stand transfer with Modified [place]  4. Bed to chair transfer with Modified [place] using Rolling Walker  5. Gait  x 100 feet with Modified [place] using Rolling Walker.   6.  Ascend/Descend 4 inch curb step with Contact Guard Assistance using Rolling Walker.                         DME Justifications:   Octavio requires a commode for home use because he is confined to a single room.   Octavio's mobility limitation cannot be sufficiently resolved by the use of a cane. His functional mobility deficit can be sufficiently resolved with the use of a Rolling Walker. Patient's mobility limitation significantly impairs their ability to participate in one of more activities of daily living.  The use of a RW will significantly improve the patient's ability to participate in MRADLS and the patient will use it on regular basis in the home.    Time Tracking:     PT Received On: 01/17/25  PT Start Time: 1047     PT Stop Time: 1125  PT Total Time (min): 38 min     Billable Minutes: Gait Training 23 and Therapeutic Activity 15    Treatment Type: Treatment  PT/PTA: PT     Number of PTA visits since last PT visit: 0     01/17/2025

## 2025-01-17 NOTE — PLAN OF CARE
Problem: Physical Therapy  Goal: Physical Therapy Goal  Description: Goals to be met by: 25     Patient will increase functional independence with mobility by performin. Supine to sit with Modified [place]  2. Sit to supine with Modified [place]  3. Sit to stand transfer with Modified [place]  4. Bed to chair transfer with Modified [place] using Rolling Walker  5. Gait  x 100 feet with Modified [place] using Rolling Walker.   6. Ascend/Descend 4 inch curb step with Contact Guard Assistance using Rolling Walker.    Outcome: Progressing     Pt is progressing well with therapy, no dizziness reported today. Pt ambulated ~120 ft with RW and CGA progressed to SBA. Pt with mild unsteadiness but no LOB. Recommending low intensity therapy upon d/c. DME recs: BSC, RW, shower chair, and hip kit.     The mobility limitation cannot be sufficiently resolved by the use of a cane.   Patient's functional mobility deficit can be sufficiently resolved with the use of a rolling walker. Patient's mobility limitation significantly impairs their ability to participate in one of more activities of daily living. The use of a rolling walker will significantly improve the patient's ability to participate in MRADLS and the patient will use it on regular basis in the home.     This patient requires a bedside commode / 3 in 1 commode because they are physically incapable of utilizing their regular toilet facility for a 30-90 day period due to current non-ambulatory status. The patient will be confined to a single room or to one level of the home and there is no toilet on that level, or there are no toilet facilities in the home.        This patient would BENEFIT from a bedside commode, though they are minimally ambulatory, in order to maintain medically prescribed precautions and thus not cause failure of the integrity of their recent surgical intervention

## 2025-01-18 PROCEDURE — 97116 GAIT TRAINING THERAPY: CPT

## 2025-01-18 PROCEDURE — 11000001 HC ACUTE MED/SURG PRIVATE ROOM

## 2025-01-18 PROCEDURE — 97530 THERAPEUTIC ACTIVITIES: CPT

## 2025-01-18 PROCEDURE — 99900035 HC TECH TIME PER 15 MIN (STAT)

## 2025-01-18 PROCEDURE — 63600175 PHARM REV CODE 636 W HCPCS: Performed by: NEUROLOGICAL SURGERY

## 2025-01-18 PROCEDURE — 25000003 PHARM REV CODE 250: Performed by: NEUROLOGICAL SURGERY

## 2025-01-18 PROCEDURE — 94799 UNLISTED PULMONARY SVC/PX: CPT | Mod: XB

## 2025-01-18 PROCEDURE — 94761 N-INVAS EAR/PLS OXIMETRY MLT: CPT

## 2025-01-18 RX ORDER — CELECOXIB 200 MG/1
200 CAPSULE ORAL 2 TIMES DAILY
Qty: 60 CAPSULE | Refills: 3 | Status: SHIPPED | OUTPATIENT
Start: 2025-01-18

## 2025-01-18 RX ORDER — HYDROCODONE BITARTRATE AND ACETAMINOPHEN 10; 325 MG/1; MG/1
1 TABLET ORAL EVERY 6 HOURS PRN
Qty: 28 TABLET | Refills: 0 | Status: SHIPPED | OUTPATIENT
Start: 2025-01-18

## 2025-01-18 RX ORDER — METHOCARBAMOL 750 MG/1
750 TABLET, FILM COATED ORAL 3 TIMES DAILY PRN
Qty: 21 TABLET | Refills: 2 | Status: SHIPPED | OUTPATIENT
Start: 2025-01-18 | End: 2025-02-08

## 2025-01-18 RX ADMIN — HEPARIN SODIUM 5000 UNITS: 5000 INJECTION INTRAVENOUS; SUBCUTANEOUS at 09:01

## 2025-01-18 RX ADMIN — CELECOXIB 200 MG: 100 CAPSULE ORAL at 08:01

## 2025-01-18 RX ADMIN — MUPIROCIN: 20 OINTMENT TOPICAL at 09:01

## 2025-01-18 RX ADMIN — BISACODYL 10 MG: 10 SUPPOSITORY RECTAL at 09:01

## 2025-01-18 RX ADMIN — SENNOSIDES AND DOCUSATE SODIUM 2 TABLET: 8.6; 5 TABLET ORAL at 08:01

## 2025-01-18 RX ADMIN — CELECOXIB 200 MG: 100 CAPSULE ORAL at 09:01

## 2025-01-18 RX ADMIN — OXYCODONE 10 MG: 5 TABLET ORAL at 08:01

## 2025-01-18 RX ADMIN — ACETAMINOPHEN 650 MG: 325 TABLET ORAL at 05:01

## 2025-01-18 RX ADMIN — METHOCARBAMOL TABLETS 750 MG: 750 TABLET, COATED ORAL at 08:01

## 2025-01-18 RX ADMIN — ACETAMINOPHEN 650 MG: 325 TABLET ORAL at 01:01

## 2025-01-18 RX ADMIN — NIFEDIPINE 30 MG: 30 TABLET, FILM COATED, EXTENDED RELEASE ORAL at 09:01

## 2025-01-18 RX ADMIN — MUPIROCIN: 20 OINTMENT TOPICAL at 08:01

## 2025-01-18 RX ADMIN — HEPARIN SODIUM 5000 UNITS: 5000 INJECTION INTRAVENOUS; SUBCUTANEOUS at 08:01

## 2025-01-18 NOTE — PLAN OF CARE
Patient is AAOx4. Patient given medications as ordered per MAR. PRN Tylenol given for pain. LSO in place when patient ambulates or is up in chair. Safety maintained. Bed alarm set.  Instructed to use call light for assistance.             Problem: Adult Inpatient Plan of Care  Goal: Plan of Care Review  Outcome: Progressing     Problem: Wound  Goal: Optimal Functional Ability  Outcome: Progressing     Problem: Fall Injury Risk  Goal: Absence of Fall and Fall-Related Injury  Outcome: Progressing

## 2025-01-18 NOTE — DISCHARGE INSTRUCTIONS
Remain active with walking and other light activities daily.  No heavy lifting, no extreme bending or twisting.   Wear TLSO brace when sitting, standing, walking  Okay to shower on 01/19/2025, leave incisions air dry, no bath  No driving for 2 weeks

## 2025-01-18 NOTE — PROGRESS NOTES
The patient mobility limitation cannot be sufficiently resolved by the use of a cane. The patient functional mobility deficit can be sufficiently resolved with the use of a Rolling Walker. Patient's mobility limitation significantly impairs their ability to participate in one of more activities of daily living.  The use of a RW will significantly improve the patient's ability to participate in MRADLS and the patient will use it on regular basis in the home.

## 2025-01-18 NOTE — PLAN OF CARE
1305  CM was informed by Dr Lindquist that the pt will be medically stable to dc home with HH tomorrow & will need a shower chair, walker, BSC, & hip kit.     1340  HH referral & orders sent via [employer].. Awaiting response.     1400  Message sent to Patricia baxter/[facility] informing of orders for home RW, BSC, hip kit, & shower chair noted and that the pt will dc tomorrow. Awaiting response.     1425  Patient awake & alert sitting in the recliner  when CM rounded via [employer]. TLSO brace in use. No family present. Patient in agreement with plan to discharge home with HH tomorrow. CM informed the pt of home equipment ordered. Pt stated he has a RW, grabber, & does not need the BSC but would like to purchase the shower chair from [employer]. Message sent to Patricia informing of above.     Pt has been accepted by [facility]. Information added to the pt's discharge paperwork.     Previously scheduled post-op appt with JOS Contreras (neuro-surg) on 1/30/2025 at 1100 w/x-ray at 1000 noted. Information added to the pt's discharge paperwork.     1430  CM was informed by Patricia that the pt does not have his wallet & that she will call the pt again tomorrow for payment.     1540  CM was informed by Patricia that the payment has been made & that the shower chair can be delivered to the pt. Shower chair to be delivered to the pt tomorrow prior to dc.

## 2025-01-18 NOTE — PROGRESS NOTES
NEUROSURGICAL POST-OPERATIVE PROGRESS NOTE    DATE OF SERVICE:  01/18/2025      ATTENDING PHYSICIAN:  Clemente Lindquist MD    SUBJECTIVE:    INTERIM HISTORY:    This is a very pleasant 74 y.o. y.o. male, who is status postop day 2 L2-3, L3-4, L4-5 oblique interbody fusion with posterior minimally invasive segmental instrumentation.  Doing well.  Mobilizing well.  Denies having sciatica pain that he had prior to surgery.  No new onset of motor weakness or numbness.  Pain is well-controlled voiding appropriately.              OBJECTIVE:    PHYSICAL EXAMINATION:   Vitals:    01/18/25 1106   BP: (!) 158/81   Pulse: 80   Resp: 18   Temp: 97.6 °F (36.4 °C)       Physical Exam:  Vitals reviewed.    Constitutional: He appears well-developed and well-nourished.     Eyes: Pupils are equal, round, and reactive to light. Conjunctivae and EOM are normal.     Cardiovascular: Normal distal pulses and no edema.     Abdominal: Soft.     Skin: Skin displays no rash on trunk and no rash on extremities. Skin displays no lesions on trunk and no lesions on extremities.     Psych/Behavior: He is alert. He is oriented to person, place, and time. He has a normal mood and affect.     Musculoskeletal:        Neck: Range of motion is full.       Ortho Exam    Neurological Exam  Mental Status  Alert. Oriented to person, place, and time.    Cranial Nerves  CN III, IV, VI: Extraocular movements intact bilaterally. Pupils equal round and reactive to light bilaterally.    Motor   Strength is 5/5 throughout all four extremities.      Dressing clean and dry on inspection    DIAGNOSTIC DATA:    X-ray of the lumbar spine, AP and lateral views reveals the fusion hardware is intact. No loosening of screws or migration of hardware.    ASSESSMENT:    This is a 74 y.o. male who is s/p day 2 L2-L5 fusion.  Doing well.    Problem List Items Addressed This Visit          Neuro    * (Principal) Lumbar stenosis with neurogenic claudication (Chronic)    Relevant  Medications    HYDROcodone-acetaminophen (NORCO)  mg per tablet    Other Relevant Orders    Vital signs    Turn cough deep breathe    Oral Care    Neurovascular checks:  RLE, LLE    Intake and output    Place sequential compression device    Chlorohexidine Bath    Incentive spirometry    Pulse Oximetry Q4H    Incentive spirometry    PT evaluate and treat    OT evaluate and treat    Transfer patient (Completed)    PT assistance with ambulation    Patient must wear orthosis while sitting or standing    LSO brace    Diet Adult Regular    CBC auto differential (Completed)    Basic metabolic panel (Completed)    X-Ray Lumbar Spine Ap And Lateral (Completed)    Full code    Ambulatory referral/consult to Home Health    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    Relevant Medications    HYDROcodone-acetaminophen (NORCO)  mg per tablet    Other Relevant Orders    Admit to Inpatient (Completed)    Vital signs    Turn cough deep breathe    Oral Care    Neurovascular checks:  RLE, LLE    Intake and output    Place sequential compression device    Chlorohexidine Bath    Incentive spirometry    Pulse Oximetry Q4H    Incentive spirometry    PT evaluate and treat    OT evaluate and treat    Transfer patient (Completed)    PT assistance with ambulation    Patient must wear orthosis while sitting or standing    LSO brace    Diet Adult Regular    CBC auto differential (Completed)    Basic metabolic panel (Completed)    X-Ray Lumbar Spine Ap And Lateral (Completed)       Cardiac/Vascular    Essential hypertension    Relevant Medications    HYDROcodone-acetaminophen (NORCO)  mg per tablet    Other Relevant Orders    Vital signs    Turn cough deep breathe    Oral Care    Neurovascular checks:  RLE, LLE    Intake and output    Place sequential compression device    Chlorohexidine Bath    Incentive spirometry    Pulse Oximetry Q4H    Incentive spirometry    PT evaluate and treat    OT evaluate and treat    Transfer  patient (Completed)    PT assistance with ambulation    Patient must wear orthosis while sitting or standing    LSO brace    Diet Adult Regular    CBC auto differential (Completed)    Basic metabolic panel (Completed)    X-Ray Lumbar Spine Ap And Lateral (Completed)       PLAN:    Discharge home tomorrow with home health PT/OT  Rolling walker, shower chair, hip kit, bedside commode ordered  See discharge instructions/prescriptions        Clemente Lindquist MD  Pager: [phone]

## 2025-01-18 NOTE — PT/OT/SLP PROGRESS
Physical Therapy Treatment    Patient Name:  Octavio Constantino   MRN:  [id number]    Recommendations:     Discharge Recommendations: Low Intensity Therapy  Discharge Equipment Recommendations: walker, rolling, shower chair, bedside commode, hip kit  Barriers to discharge: None    Assessment:     Octavio Constantino is a 74 y.o. male admitted with a medical diagnosis of Lumbar stenosis with neurogenic claudication.  He presents with the following impairments/functional limitations: weakness, impaired functional mobility, impaired endurance, gait instability, impaired balance, impaired self care skills, decreased ROM, orthopedic precautions, impaired skin, pain . Pt is progressing well towards goals.    Rehab Prognosis: Good; patient would benefit from acute skilled PT services to address these deficits and reach maximum level of function.    Recent Surgery: Procedure(s) (LRB):  FUSION, SPINE, WITH INSTRUMENTATION (N/A)  FUSION, SPINE, LUMBAR (N/A) 2 Days Post-Op    Plan:     During this hospitalization, patient to be seen daily to address the identified rehab impairments via gait training, therapeutic activities, therapeutic exercises, neuromuscular re-education and progress toward the following goals:    Plan of Care Expires:  02/16/25    Subjective     Chief Complaint: post-op back pain  Patient/Family Comments/goals: to return to PLOF  Pain/Comfort:  Pain Rating 1: 5/10  Location - Side 1: Bilateral  Location - Orientation 1: lower  Location 1: back  Pain Addressed 1: Reposition, Distraction, Cessation of Activity, Nurse notified, Pre-medicate for activity  Pain Rating Post-Intervention 1: 5/10      Objective:     Communicated with nsg prior to session.  Patient found up in chair with peripheral IV, bed alarm upon PT entry to room.     General Precautions: Standard, fall  Orthopedic Precautions: spinal precautions  Braces: TLSO  Respiratory Status: Room air     Functional Mobility:  Transfers:     Sit to Stand:   "stand by assistance with rolling walker  Gait: Pt ambulated ~400 ft with RW and SBA progressed to Supervision; no LOB, steady gait  Stairs:  Pt ascended/descended 8" step with Rolling Walker with no handrails with Contact Guard Assistance. Vc's for safe sequencing, discussed home set up      AM-PAC 6 CLICK MOBILITY  Turning over in bed (including adjusting bedclothes, sheets and blankets)?: 3  Sitting down on and standing up from a chair with arms (e.g., wheelchair, bedside commode, etc.): 3  Moving from lying on back to sitting on the side of the bed?: 3  Moving to and from a bed to a chair (including a wheelchair)?: 3  Need to walk in hospital room?: 3  Climbing 3-5 steps with a railing?: 3  Basic Mobility Total Score: 18       Treatment & Education:  Pt up in chair upon arrival  Practiced stair negotiation as above X 4 trials  Pt ambulated in hallway as above  Provided and educated caregiver on use of gait belt for safe entry into home and for transfers.  Practiced HEP and LE therex such as ankle pumps, LAQs, and gentle hip ROM   Discussed overall home safety   Encouraged continued ambulation in hallway with RW and nsg/staff clearance and/or supervision  Pt able to recall 3/3 spinal precautions  TLSO donned throughout session    Patient left up in chair with all lines intact, call button in reach, and nsg notified..    GOALS:   Multidisciplinary Problems       Physical Therapy Goals          Problem: Physical Therapy    Goal Priority Disciplines Outcome Interventions   Physical Therapy Goal     PT, PT/OT Progressing    Description: Goals to be met by: 25     Patient will increase functional independence with mobility by performin. Supine to sit with Modified Sampson  2. Sit to supine with Modified Sampson  3. Sit to stand transfer with Modified Sampson  4. Bed to chair transfer with Modified Sampson using Rolling Walker  5. Gait  x 100 feet with Modified Sampson using Rolling " Walker.   6. Ascend/Descend 4 inch curb step with Contact Guard Assistance using Rolling Walker.                         DME Justifications:   Octavio requires a commode for home use because he is confined to a single room.   Octavio's mobility limitation cannot be sufficiently resolved by the use of a cane. His functional mobility deficit can be sufficiently resolved with the use of a Rolling Walker. Patient's mobility limitation significantly impairs their ability to participate in one of more activities of daily living.  The use of a RW will significantly improve the patient's ability to participate in MRADLS and the patient will use it on regular basis in the home.    Time Tracking:     PT Received On: 01/18/25  PT Start Time: 1318     PT Stop Time: 1339  PT Total Time (min): 21 min     Billable Minutes: Gait Training 10 and Therapeutic Activity 11    Treatment Type: Treatment  PT/PTA: PT     Number of PTA visits since last PT visit: 0     01/18/2025

## 2025-01-19 VITALS
RESPIRATION RATE: 18 BRPM | WEIGHT: 190.25 LBS | HEART RATE: 88 BPM | BODY MASS INDEX: 28.18 KG/M2 | TEMPERATURE: 100 F | OXYGEN SATURATION: 96 % | SYSTOLIC BLOOD PRESSURE: 134 MMHG | DIASTOLIC BLOOD PRESSURE: 73 MMHG | HEIGHT: 69 IN

## 2025-01-19 PROCEDURE — 25000003 PHARM REV CODE 250: Performed by: NEUROLOGICAL SURGERY

## 2025-01-19 PROCEDURE — 63600175 PHARM REV CODE 636 W HCPCS: Performed by: NEUROLOGICAL SURGERY

## 2025-01-19 RX ADMIN — NIFEDIPINE 30 MG: 30 TABLET, FILM COATED, EXTENDED RELEASE ORAL at 08:01

## 2025-01-19 RX ADMIN — CELECOXIB 200 MG: 100 CAPSULE ORAL at 08:01

## 2025-01-19 RX ADMIN — ACETAMINOPHEN 650 MG: 325 TABLET ORAL at 04:01

## 2025-01-19 RX ADMIN — HEPARIN SODIUM 5000 UNITS: 5000 INJECTION INTRAVENOUS; SUBCUTANEOUS at 08:01

## 2025-01-19 RX ADMIN — MUPIROCIN: 20 OINTMENT TOPICAL at 08:01

## 2025-01-19 NOTE — PROGRESS NOTES
Octavio requires a commode for home use because he is confined to one level of the home environment and there is no toilet on that level.

## 2025-01-19 NOTE — PLAN OF CARE
01/19/25 0850   Final Note   Assessment Type Final Discharge Note   Anticipated Discharge Disposition Home   What phone number can be called within the next 1-3 days to see how you are doing after discharge? [phone]   Post-Acute Status   Discharge Delays None known at this time      spoke with pt regarding discharge plan to home. Per JESSI Tran's note, pt was agreeable to  services and accepted by [facility]. Pt paid for medical equipment BS and SW will deliver to bedside.    Pt informed SW that his son will transport him home at D/C and he has no additional needs at this time.     Follow up appointments scheduled below.     Future Appointments   Date Time Provider Department Center   1/30/2025 10:00 AM [facility] ODC XR-A LIMIT 350 LBS [facility] XRAY OP [facility]   1/30/2025 11:00 AM Cass Contreras PA-C [facility] NEUROSU [facility]   2/19/2025  9:30 AM [facility] Sullivan MD [facility]   2/28/2025 10:00 AM [facility] ODC XR-A LIMIT 350 LBS [facility] XRAY OP [place] [facility]   2/28/2025 11:00 AM Cass Contreras PA-C [facility] NEUROSU [place] [facility]   4/21/2025  2:00 PM [facility] ORTHO CLINIC LIMIT 350LBS [facility] ORXRAY [facility]   4/21/2025  3:00 PM Clemente Lindquist MD [facility] NEUROSU [facility]

## 2025-01-19 NOTE — NURSING
AVS reviewed with virtual nurse. Verbalized understanding of upcoming appointments and home medications. Patient was transported with belongings.

## 2025-01-19 NOTE — PLAN OF CARE
AAOx4 , on room air. Nightly scheduled and prn medications administered as ordered. Patient have total body shower, wound care dressings replaced. Cleansed with vashe and 4x4 gauz,transparent tegaderm covered to the LLQ incision site. Bilateral lower back island dressings was replaced, staples and incision remain intact no dehiscence noted. All safety precautions secured.  Problem: Adult Inpatient Plan of Care  Goal: Optimal Comfort and Wellbeing  Outcome: Progressing     Problem: Wound  Outcome: Progressing     Problem: Spinal Surgery  Goal: Optimal Coping with Surgery  Outcome: Progressing  Problem: Spinal Surgery  Goal: Effective Oxygenation and Ventilation  Outcome: Progressing  Problem: Fall Injury Risk  Goal: Absence of Fall and Fall-Related Injury  Outcome: Progressing      Patient seen by my partner this morning  Chart reviewed  Patient interviewed and examined    Subjective:   Patient is A&Ox0 and laughs but otherwise can't tell me much. Per nursing, her son says she is moving less well over the last few days.     Exam:  Visit Vitals  /75 (BP Location: E, Patient Position: Semi-Gonzalez's)   Pulse 87   Temp 98.9 °F (37.2 °C) (Oral)   Resp 16   Ht 5' 5\" (1.651 m)   Wt 87.6 kg   SpO2 97%   BMI 32.14 kg/m²   GEN: NAD  HEENT: MMM  CV: RRR  PULM: CTAB, on 2L via NC  ABD: NABS, NTND  NEURO: A&Ox0 (only knew first name, laughed at me when I asked for her last name and place and date). Moving all extremities     Assessment and plan:  1. Acute Klebsiella UTI. On Ceftriaxone D1=12/26. Follow cultures   2. Deconditioning. PT/OT  3. Diarrhea/stool incontinence. Check C. Diff.   3. Hypertensive urgency. Hydralazine PRN, consider starting an oral agent if needing hydralazine     4. Dementia. Namenda  5. Hypothyroidism. Levothyroxine. TSH is a little low but T4 normal and T3 slightly low. Will hold of changing meds for now and consider repeating in a few weeks as outpatient.   6. DM II. Diet controlled. Labs with sugars in the 140-150s. Will monitor with labs checks for now.     ADDENDUM:  I discussed with her son Saroj. A&Ox0 is baseline. She has been getting weaker the last few days. And she is normally constipated.     Rafa Dsouza MD  [facility] Hospitalist  Pager [phone]  Please contact the Hospitalist from 7AM until 7pm.   From 7pm to 7am please contact the Hospitalist on call

## 2025-01-19 NOTE — PROGRESS NOTES
Patient seen before discharged.     Doing well with less pain.    Discharge instructions reviewed.    All questions answered.     FU in 2 weeks for wound check with x-ray

## 2025-01-20 NOTE — DISCHARGE SUMMARY
[facility] Surg  Neurosurgery  Discharge Summary      Patient Name: Octavio Constantino  MRN: [id number]  Admission Date: 1/16/2025  Hospital Length of Stay: 3 days  Discharge Date and Time: 1/19/2025 12:09 PM  Attending Physician: No att. providers found   Discharging Provider: Clemente Lindquist MD  Primary Care Provider: Devyn Sullivan MD     HPI: This is a 74 y.o. male who has been complaining of worsening difficulty ambulating, standing upright and bilateral leg pain for about 2 years. Pain is relieved by sitting down. He is able to push a lawnmower were but if he is tries to walk normally he has lot of difficulty. His symptoms have progressively become worse. He has tried physical therapy and has received 3 epidural steroid injection with partial pain relief. He has contemplating chiropractic treatments. No new onset of motor weakness or sphincter dysfunction. He is also complaining of bilateral hand, fingertips numbness. Does not report difficulty buttoning shirts. Has gait imbalance.     Procedure(s) (LRB):  1. Left L2-3, L3-4, L4-5 oblique interbody fusion, placement of interbody spacers, DePuy Conduit cages filled with allograft BMP and DBM  2. L2-L5 posterior segmental instrumentation using DePuy Viper Prime system  3. Registration of navigated instrumentation including intraoperative 3D imaging [facility] and [facility]    Hospital Course: The surgery was uncomplicated and postoperative course uneventful.  Patient was able to be discharged after voiding.      Consults: PT-OT    Significant Diagnostic Studies:Post-op lumbar x-ray showed good hardware positioning.     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    Specialties: Home Health Services, Home Therapy Services, Home Living Aide Services  Why: will provide home health services  Contact information:  [address]  [facility] [address]  [phone]             Dme, [facility] Follow up.    Specialties: DME Provider, Orthotics  Why: will provide a shower chair for home use  Contact information:  [address]  [address]  [place] [address]  [phone]               Cass Contreras PA-C Follow up on 1/30/2025.    Specialties: Neurosurgery, Spine Surgery  Why: at 11:00 AM; neuro-surg post op appointment; with x-ray at 10:00 AM  Contact information:  [address]  [address]  [facility] [id number] [phone]                           Patient Instructions:      WALKER FOR HOME USE     Order Specific Question Answer Comments   Type of Walker: Adult (5'4"-6'6")    With wheels? Yes    Height: 5' 9" (1.753 m)    Weight: 86.3 kg (190 lb 4.1 oz)    Length of need (1-99 months): 3    Does patient have medical equipment at home? none    Please check all that apply: Patient's condition impairs ambulation.      BATH/SHOWER CHAIR FOR HOME USE     Order Specific Question Answer Comments   Height: 5' 9" (1.753 m)    Weight: 86.3 kg (190 lb 4.1 oz)    Does patient have medical equipment at home? none    Length of need (1-99 months): 3    Type: With back      COMMODE FOR HOME USE     Order Specific Question Answer Comments   Type: Standard    Height: 5' 9" (1.753 m)    Weight: 86.3 kg (190 lb 4.1 oz)    Does patient have medical equipment at home? none    Length of need (1-99 months): 3      HIP KIT FOR HOME USE     Order Specific Question Answer Comments   Height: 5' 9" (1.753 m)    Weight: 86.3 kg (190 lb 4.1 oz)    Does patient have medical equipment at home? none    Length of need (1-99 months): 3  "   Type: [place] Hip Kit      Medications:  Reconciled Home Medications:      Medication List        START taking these medications      celecoxib 200 MG capsule  Commonly known as: CeleBREX  Take 1 capsule (200 mg total) by mouth 2 (two) times daily.     HYDROcodone-acetaminophen  mg per tablet  Commonly known as: NORCO  Take 1 tablet by mouth every 6 (six) hours as needed for Pain.     methocarbamoL 750 MG Tab  Commonly known as: ROBAXIN  Take 1 tablet (750 mg total) by mouth 3 (three) times daily as needed (Muscle spasms).            CONTINUE taking these medications      irbesartan 300 MG tablet  Commonly known as: AVAPRO  TAKE 1 TABLET EVERY EVENING     NIFEdipine 30 MG (OSM) 24 hr tablet  Commonly known as: PROCARDIA-XL  TAKE 1 TABLET ONE TIME DAILY     OCUVITE LUTEIN 25 ORAL  Take 1 tablet by mouth once daily.            STOP taking these medications      meloxicam 15 MG tablet  Commonly known as: TAYLOR Lindquist MD  Neurosurgery  [facility] Surg

## 2025-01-23 ENCOUNTER — PATIENT OUTREACH (OUTPATIENT)
Dept: ADMINISTRATIVE | Facility: CLINIC | Age: 75
End: 2025-01-23
Payer: MEDICARE

## 2025-01-23 NOTE — PROGRESS NOTES
C3 nurse spoke with Octavio Constantino for a TCC post hospital discharge follow up call. The patient reports does not have a scheduled HOSFU appointment. C3 nurse was unable to schedule [facility]. Patient advised to contact their PCP to schedule a HOSPFU within 5-7 days.     Declined Home NP visit.

## 2025-01-24 ENCOUNTER — TELEPHONE (OUTPATIENT)
Dept: NEUROSURGERY | Facility: CLINIC | Age: 75
End: 2025-01-24
Payer: MEDICARE

## 2025-01-24 NOTE — TELEPHONE ENCOUNTER
Returned call to physical therapist, Shreya she stated that the pt's dressing on his incision is falling off . I stated to Shreya to let the dressings fall off and leave it open to air. Shreya voiced understanding

## 2025-01-30 ENCOUNTER — OFFICE VISIT (OUTPATIENT)
Dept: NEUROSURGERY | Facility: CLINIC | Age: 75
End: 2025-01-30
Payer: MEDICARE

## 2025-01-30 ENCOUNTER — HOSPITAL ENCOUNTER (OUTPATIENT)
Dept: RADIOLOGY | Facility: HOSPITAL | Age: 75
Discharge: HOME | End: 2025-01-30
Attending: NEUROLOGICAL SURGERY
Payer: MEDICARE

## 2025-01-30 VITALS
HEART RATE: 78 BPM | HEIGHT: 69 IN | BODY MASS INDEX: 28.18 KG/M2 | WEIGHT: 190.25 LBS | DIASTOLIC BLOOD PRESSURE: 80 MMHG | SYSTOLIC BLOOD PRESSURE: 142 MMHG

## 2025-01-30 DIAGNOSIS — Z98.1 S/P LUMBAR FUSION: ICD-10-CM

## 2025-01-30 DIAGNOSIS — Z98.1 S/P LUMBAR SPINAL FUSION: Primary | ICD-10-CM

## 2025-01-30 PROCEDURE — 99024 POSTOP FOLLOW-UP VISIT: CPT | Mod: S$GLB,,, | Performed by: PHYSICIAN ASSISTANT

## 2025-01-30 PROCEDURE — 1125F AMNT PAIN NOTED PAIN PRSNT: CPT | Mod: CPTII,S$GLB,, | Performed by: PHYSICIAN ASSISTANT

## 2025-01-30 PROCEDURE — 1160F RVW MEDS BY RX/DR IN RCRD: CPT | Mod: CPTII,S$GLB,, | Performed by: PHYSICIAN ASSISTANT

## 2025-01-30 PROCEDURE — 3288F FALL RISK ASSESSMENT DOCD: CPT | Mod: CPTII,S$GLB,, | Performed by: PHYSICIAN ASSISTANT

## 2025-01-30 PROCEDURE — 72100 X-RAY EXAM L-S SPINE 2/3 VWS: CPT | Mod: TC,FY

## 2025-01-30 PROCEDURE — 72100 X-RAY EXAM L-S SPINE 2/3 VWS: CPT | Mod: 26,,, | Performed by: RADIOLOGY

## 2025-01-30 PROCEDURE — 3079F DIAST BP 80-89 MM HG: CPT | Mod: CPTII,S$GLB,, | Performed by: PHYSICIAN ASSISTANT

## 2025-01-30 PROCEDURE — 99999 PR PBB SHADOW E&M-EST. PATIENT-LVL III: CPT | Mod: PBBFAC,,, | Performed by: PHYSICIAN ASSISTANT

## 2025-01-30 PROCEDURE — 1101F PT FALLS ASSESS-DOCD LE1/YR: CPT | Mod: CPTII,S$GLB,, | Performed by: PHYSICIAN ASSISTANT

## 2025-01-30 PROCEDURE — 3077F SYST BP >= 140 MM HG: CPT | Mod: CPTII,S$GLB,, | Performed by: PHYSICIAN ASSISTANT

## 2025-01-30 PROCEDURE — 1159F MED LIST DOCD IN RCRD: CPT | Mod: CPTII,S$GLB,, | Performed by: PHYSICIAN ASSISTANT

## 2025-01-30 PROCEDURE — 1157F ADVNC CARE PLAN IN RCRD: CPT | Mod: CPTII,S$GLB,, | Performed by: PHYSICIAN ASSISTANT

## 2025-01-30 NOTE — PROGRESS NOTES
"  Postoperative Wound Check:    Date of surgery 01/16/2025     Preop diagnosis   1. Spinal stenosis with neurogenic claudication   2. Foraminal stenosis lumbar spine with radiculopathy     Postop diagnosis   Same     Surgery   1. Left L2-3, L3-4, L4-5 oblique interbody fusion, placement of interbody spacers, DePuy Conduit cages filled with allograft BMP and DBM  2. L2-L5 posterior segmental instrumentation using DePuy Viper Prime system  3. Registration of navigated instrumentation including intraoperative 3D imaging [facility] and [facility]     Surgeon   Clemente Lindquist MD          HPI:    Patient states he is doing well.  Mild back pain.  No leg pain.  He has some numbness in the right toes.  He is working with home health PT OT.  Wearing his brace.  He is not taking pain medication and has been taking Celebrex twice a day and muscle relaxer as needed.     Patient denies any problems with the incisions.  No redness, swelling, or drainage, and no fever, chills, or sweats.      Physical Exam:    Vitals:    01/30/25 1055   BP: (!) 142/80   Pulse: 78   Weight: 86.3 kg (190 lb 4.1 oz)   Height: 5' 9" (1.753 m)   PainSc:   2   PainLoc: Back         Incision:  Wound edges are approximated.  No redness, swelling, or drainage.      Diagnosis:     1. S/P lumbar spinal fusion              Plan:              -Medical Assistant removed the staples without any complications.  Chloraprep applied.  -x-rays show good hardware placement   -continue home health PTOT   -continue walking   -continue wearing back brace   -okay to take Celebrex as needed  -okay to drive  -she will message Dr. Lindquist regarding questions involving the bone growth stimulator        The patient will follow up with me in 4 weeks for the 6 week po fu with xrays beforehand.    Cass Contreras, [facility], PA-C  Neurosurgery  [facility]        "

## 2025-02-12 ENCOUNTER — LAB VISIT (OUTPATIENT)
Dept: LAB | Facility: HOSPITAL | Age: 75
End: 2025-02-12
Attending: FAMILY MEDICINE
Payer: MEDICARE

## 2025-02-12 DIAGNOSIS — E03.8 SUBCLINICAL HYPOTHYROIDISM: ICD-10-CM

## 2025-02-12 DIAGNOSIS — I10 ESSENTIAL HYPERTENSION: ICD-10-CM

## 2025-02-12 DIAGNOSIS — E78.00 HIGH CHOLESTEROL: ICD-10-CM

## 2025-02-12 LAB
ALBUMIN SERPL BCP-MCNC: 3.7 G/DL (ref 3.5–5.2)
ALP SERPL-CCNC: 116 U/L (ref 40–150)
ALT SERPL W/O P-5'-P-CCNC: 6 U/L (ref 10–44)
ANION GAP SERPL CALC-SCNC: 10 MMOL/L (ref 8–16)
AST SERPL-CCNC: 19 U/L (ref 10–40)
BILIRUB SERPL-MCNC: 0.9 MG/DL (ref 0.1–1)
BUN SERPL-MCNC: 13 MG/DL (ref 8–23)
CALCIUM SERPL-MCNC: 9.2 MG/DL (ref 8.7–10.5)
CHLORIDE SERPL-SCNC: 103 MMOL/L (ref 95–110)
CHOLEST SERPL-MCNC: 188 MG/DL (ref 120–199)
CHOLEST/HDLC SERPL: 2.8 {RATIO} (ref 2–5)
CO2 SERPL-SCNC: 24 MMOL/L (ref 23–29)
CREAT SERPL-MCNC: 0.7 MG/DL (ref 0.5–1.4)
EST. GFR  (NO RACE VARIABLE): >60 ML/MIN/1.73 M^2
GLUCOSE SERPL-MCNC: 93 MG/DL (ref 70–110)
HDLC SERPL-MCNC: 66 MG/DL (ref 40–75)
HDLC SERPL: 35.1 % (ref 20–50)
LDLC SERPL CALC-MCNC: 109.4 MG/DL (ref 63–159)
NONHDLC SERPL-MCNC: 122 MG/DL
POTASSIUM SERPL-SCNC: 4.4 MMOL/L (ref 3.5–5.1)
PROT SERPL-MCNC: 6.6 G/DL (ref 6–8.4)
SODIUM SERPL-SCNC: 137 MMOL/L (ref 136–145)
T4 FREE SERPL-MCNC: 0.81 NG/DL (ref 0.71–1.51)
TRIGL SERPL-MCNC: 63 MG/DL (ref 30–150)
TSH SERPL DL<=0.005 MIU/L-ACNC: 4 UIU/ML (ref 0.4–4)

## 2025-02-12 PROCEDURE — 84439 ASSAY OF FREE THYROXINE: CPT | Performed by: FAMILY MEDICINE

## 2025-02-12 PROCEDURE — 80053 COMPREHEN METABOLIC PANEL: CPT | Performed by: FAMILY MEDICINE

## 2025-02-12 PROCEDURE — 84443 ASSAY THYROID STIM HORMONE: CPT | Performed by: FAMILY MEDICINE

## 2025-02-12 PROCEDURE — 36415 COLL VENOUS BLD VENIPUNCTURE: CPT | Performed by: FAMILY MEDICINE

## 2025-02-12 PROCEDURE — 80061 LIPID PANEL: CPT | Performed by: FAMILY MEDICINE

## 2025-02-19 PROBLEM — F10.10 EXCESSIVE DRINKING OF ALCOHOL: Status: ACTIVE | Noted: 2025-02-19

## 2025-02-28 ENCOUNTER — OFFICE VISIT (OUTPATIENT)
Dept: NEUROSURGERY | Facility: CLINIC | Age: 75
End: 2025-02-28
Payer: MEDICARE

## 2025-02-28 ENCOUNTER — HOSPITAL ENCOUNTER (OUTPATIENT)
Dept: RADIOLOGY | Facility: HOSPITAL | Age: 75
Discharge: HOME | End: 2025-02-28
Attending: NEUROLOGICAL SURGERY
Payer: MEDICARE

## 2025-02-28 VITALS
HEIGHT: 69 IN | DIASTOLIC BLOOD PRESSURE: 71 MMHG | HEART RATE: 76 BPM | SYSTOLIC BLOOD PRESSURE: 117 MMHG | WEIGHT: 196 LBS | BODY MASS INDEX: 29.03 KG/M2

## 2025-02-28 DIAGNOSIS — Z98.1 S/P LUMBAR SPINAL FUSION: Primary | ICD-10-CM

## 2025-02-28 DIAGNOSIS — Z98.1 S/P LUMBAR FUSION: ICD-10-CM

## 2025-02-28 PROCEDURE — 72100 X-RAY EXAM L-S SPINE 2/3 VWS: CPT | Mod: TC,FY

## 2025-02-28 PROCEDURE — 72100 X-RAY EXAM L-S SPINE 2/3 VWS: CPT | Mod: 26,,, | Performed by: RADIOLOGY

## 2025-02-28 PROCEDURE — 99999 PR PBB SHADOW E&M-EST. PATIENT-LVL III: CPT | Mod: PBBFAC,,, | Performed by: PHYSICIAN ASSISTANT

## 2025-02-28 NOTE — PROGRESS NOTES
"  Subjective:     Patient ID:  Octavio Constantino is a 74 y.o. male.    Lexa    Chief Complaint: 6 week po lspine fusion    HPI    Octavio Constantino is a 74 y.o. male who presents for follow up.  Mild back pain that comes and goes somewhat into the hips.  Some right foot numbness more so when lying down.  He has been wearing the back brace.  Does of feel like his walking and strides are back to normal but have been improving.    Patient denies any recent accidents or trauma, no saddle anesthesias, and no bowel or bladder incontinence.      Review of Systems:  Constitution: Negative for chills, fever, night sweats and weight loss.   Musculoskeletal: Negative for falls.   Gastrointestinal: Negative for bowel incontinence, nausea and vomiting.   Genitourinary: Negative for bladder incontinence.   Neurological: Negative for disturbances in coordination and loss of balance.      Objective:      Vitals:    02/28/25 1029   BP: 117/71   Pulse: 76   Weight: 88.9 kg (195 lb 15.8 oz)   Height: 5' 9" (1.753 m)   PainSc:   1   PainLoc: Back         Physical Exam:      General:  Octavio Constantino is well-developed, well-nourished, appears stated age, in no acute distress, alert and oriented to person, place, and time.    Pulmonary/Chest:  Respiratory effort normal  Abdominal: Exhibits no distension  Psychiatric:  Normal mood and affect.  Behavior is normal.  Judgement and thought content normal      Musculoskeletal:    Lumbar Spine Inspection:  Healed surgical scars with no visible rashes.    Neurological:  Alert and oriented to person, place, and time    Muscle strength against resistance:     Right Left   Hip flexion  5 / 5 5 / 5   Hip extension 5 / 5 5 / 5   Hip abduction 5 / 5 5 / 5   Hip adduction  5 / 5 5 / 5   Knee extension  5 / 5 5 / 5   Knee flexion 5 / 5 5 / 5   Dorsiflexion  5 / 5 5 / 5   EHL  5 / 5 5 / 5   Plantar flexion  5 / 5 5 / 5   Inversion of the feet 5 / 5 5 / 5   Eversion of the feet  5 / 5 5 / 5 "       Reflexes:      Clonus:  Negative bilaterally    On gross examination of the bilateral upper extremities, patient has full painfree ROM with no signs of clubbing, cyanosis, edema, or weakness.       XRAY Interpretation:     Lumbar spine xrays were personally reviewed today.  No fractures.  Hardware intact.      Assessment:          1. S/P lumbar spinal fusion            Plan:          Orders Placed This Encounter    Ambulatory Referral/Consult to Physical Therapy/Occupational Therapy     -physical therapy through [facility]   -continue wearing back brace   -continue walking   -Patient will follow-up with Dr. Lindquist in 6 weeks with xrays for the 3 month post op follow-up.        Follow-Up:  Follow up in about 6 weeks (around 4/11/2025). If there are any questions prior to this, the patient was instructed to contact the office.       Cass Contreras [facility] [place], PA-C  Neurosurgery  [facility]  02/28/2025

## 2025-03-03 ENCOUNTER — PATIENT MESSAGE (OUTPATIENT)
Dept: NEUROSURGERY | Facility: CLINIC | Age: 75
End: 2025-03-03
Payer: MEDICARE

## 2025-03-06 ENCOUNTER — CLINICAL SUPPORT (OUTPATIENT)
Dept: REHABILITATION | Facility: HOSPITAL | Age: 75
End: 2025-03-06
Payer: MEDICARE

## 2025-03-06 DIAGNOSIS — Z98.1 S/P LUMBAR SPINAL FUSION: ICD-10-CM

## 2025-03-06 DIAGNOSIS — R29.898 WEAKNESS OF RIGHT LOWER EXTREMITY: ICD-10-CM

## 2025-03-06 DIAGNOSIS — M53.86 DECREASED ROM OF LUMBAR SPINE: Primary | ICD-10-CM

## 2025-03-06 PROCEDURE — 97162 PT EVAL MOD COMPLEX 30 MIN: CPT | Mod: PN

## 2025-03-06 PROCEDURE — 97530 THERAPEUTIC ACTIVITIES: CPT | Mod: PN

## 2025-03-06 NOTE — PROGRESS NOTES
Outpatient Rehab    Physical Therapy Evaluation    Patient Name: Octavio Constantino  MRN: [id number]  YOB: 1950  Encounter Date: 3/6/2025    Therapy Diagnosis:   Encounter Diagnoses   Name Primary?    S/P lumbar spinal fusion     Decreased ROM of lumbar spine Yes    Weakness of right lower extremity      Physician: Cass Contreras, *    Physician Orders: Eval and Treat  Physician Orders: Eval and Treat  Medical Diagnosis: Z98.1 (ICD-10-CM) - S/P lumbar spinal fusion     Visit # / Visits Authorized:  1 / 20   Date of Evaluation:  3/6/2025   Insurance Authorization Period: 3/6/2025 to 12/31/2025   Plan of Care Certification:  3/6/2025 to 4/30/2025      Time In: 0210   Time Out: 0300  Total Time: 50   Total Billable Time: 50 minutes      Time In: 0210   Time Out: 0300  Total Time: 50   Total Billable Time: 50    Intake Outcome Measure for FOTO Survey    Therapist reviewed FOTO scores for Octavio Constantino on 3/6/2025.   FOTO report - see Media section or FOTO account episode details.     Intake Score: 46%         Subjective   History of Present Illness  Octavio is a 74 y.o. male who reports to physical therapy with a chief concern of Low back pain.     The patient reports a medical diagnosis of Z98.1 (ICD-10-CM) - S/P lumbar spinal fusion.  Patient reports a surgery of 1. Left L2-3, L3-4, L4-5 oblique interbody fusion, placement of interbody spacers, DePuy Conduit cages filled with allograft BMP and DBM  2. L2-L5 posterior segmental instrumentation using DePuy Viper Prime system  3. Registration of navigated instrumentation including intraoperative 3D imaging [facility] and [facility] station. Surgery occurred on 01/16/25. Diagnostic tests related to this condition: X-ray.   X-Ray Details: Recent operative changes of L2 through L5 instrumented fusion without complicating features.    History of Present Condition/Illness: Present s/p 7 weeks lumbar spinal fusion. Received home health for about 3 weeks.  He has been pretty sedentary since the surgery. He has been trying to get out and walk, but gets pretty tired quickly. Feels like his gait still isn't normal. Since surgery, he has been having numbness in the tops of his toes in right foot, usually worse when lying down. Little bit of numbness in left toes as well. Numbness wasn't happening before surgery. Midline lumbar spine discomfort with transition activities, bending forwards, long duration standing, and walking. Patient denies radicular symptoms, incontinence, weakness or changes in gait, saddle/perineal paresthesia, pain unchanged with rest, or unexplainable weight loss. As far as he is aware, he is supposed to keep the TLSO brace on until next appointment.      Pain     Patient reports a current pain level of 1/10. Pain at best is reported as 0/10. Pain at worst is reported as 4/10.   Location: Midline lumbar spine and upper buttocks  Clinical Progression (since onset): Stable  Pain Qualities: Knife-like, Sharp  Pain-Relieving Factors: Sitting, Other (Comment), Rest  Other Pain-Relieving Factors: brace  Pain-Aggravating Factors: Bending, Movement, Standing, Stair climbing, Walking, Transfers         Living Arrangements  Living Situation  Housing: Home independently  Living Arrangements: Family members    Home Setup  Number of Levels in Home: Two level  Primary Bedroom: 1st floor        Employment  Employment Status: Retired          Past Medical History/Physical Systems Review:   Octavio Constantino  has a past medical history of Asthma, Gout, Hypertension, and Lumbar stenosis with neurogenic claudication.    Octavio Constantino  has a past surgical history that includes Mouth surgery (02/2018); Adenoidectomy; Tonsillectomy; Eye surgery; Refractive surgery (Right); Colonoscopy w/ polypectomy (05/30/2018); Colonoscopy (N/A, 5/30/2018); Transforaminal epidural injection of steroid (Bilateral, 3/15/2023); Transforaminal epidural injection of steroid  (Bilateral, 8/9/2023); Colonoscopy (N/A, 9/13/2023); Transforaminal epidural injection of steroid (Bilateral, 7/18/2024); Fusion of spine with instrumentation (N/A, 1/16/2025); and Lumbar fusion (N/A, 1/16/2025).    Octavio has a current medication list which includes the following prescription(s): celecoxib, irbesartan, lutein/zeaxanthin, and nifedipine.    Review of patient's allergies indicates:  No Known Allergies     Objective   Bracing  Patient presents with a thoracic/lumbar brace type of Thoracic lumbar-sacral orthosis (TLSO).           Posture  Patient presents with a Forward head position.     Shoulders are Rounded. Right scapula is: Protracted              Lower Extremity Sensation  Right Lumbar/Lower Extremity Sensation  Intact: Light Touch       Left Lumbar/Lower Extremity Sensation  Intact: Light Touch                Right Lower Extremity Reflexes  Patellar, L4: Normal (2+)         Achilles, S1: Absent (0)         Left Lower Extremity Reflexes  Patellar, L4: Normal (2+)          Achilles, S1: Absent (0)         Lower Extremity Reflex Details  Ankle clonus: (-) bilaterally        Lumbar Range of Motion   Not assessed secondary to precautions.      Hip Range of Motion   Right Hip   Active (deg) Passive (deg) Pain   Flexion         Extension         ABduction         ADduction         External Rotation 90/90 21       External Rotation Prone         Internal Rotation 90/90 18       Internal Rotation Prone             Left Hip   Active (deg) Passive (deg) Pain   Flexion         Extension         ABduction         ADduction         External Rotation 90/90 21       External Rotation Prone         Internal Rotation 90/90 20       Internal Rotation Prone                  Knee Range of Motion   Right Knee   Active (deg) Passive (deg) Pain   Flexion 115       Extension 5                          Hip Strength - Planes of Motion   Right Strength Right Pain Left Strength Left  Pain   Flexion (L2) 3+   4-    "  Extension           ABduction           ADduction           Internal Rotation 5   5     External Rotation 5   4+         Knee Strength   Right Strength Right Pain Left Strength Left  Pain   Flexion (S2) 4-   4+     Prone Flexion           Extension (L3) 4   5            Ankle/Foot Strength - Planes of Motion   Right Strength Right Pain Left Strength Left  Pain   Dorsiflexion (L4) 5   5     Plantar Flexion (S1) 4+   5     Inversion           Eversion           Great Toe Flexion           Great Toe Extension (L5)           Lesser Toes Flexion           Lesser Toes Extension                     Gait Analysis  Base of Support: Normal            Hip Observations During Gait  Bilateral: Decreased Hip Extension  Knee Observations During Gait  Right: Decreased Knee Extension in Initial Contact and Knee Decreased Extension in Midstance  Ankle/Foot Observations During Gait  Right: Ankle Delayed Push Off, Decreased Ankle Dorsiflexion on Swing, and Flat Foot Initial Contact  Gait Analysis Details  2 MWT: 320 feet          Treatment:                 Therapeutic Activity  Therapeutic Activity 1: Walking program review  Therapeutic Activity 2: SKTC: 10x5" B  Therapeutic Activity 3: Scaitic nerve glides: 15x B  Therapeutic Activity 4: BKFO: 10x5" B  Therapeutic Activity 5: Glute set with bridge: 10x  Therapeutic Activity 6: TA brace: 15x5"  Therapeutic Activity 7: Hip abduction isometric: 15x5"                   Assessment & Plan   Assessment  Octavio presents with a condition of Moderate complexity.   Presentation of Symptoms: Stable  Will Comorbidities Impact Care: Yes  hx of lumbar fusion, HTN, high cholesterol.    Functional Limitations: Activity tolerance, Completing self-care activities, Completing work/school activities, Decreased ambulation distance/endurance, Gait limitations, Increased risk of fall, Maintaining balance, Functional mobility, Pain with ADLs/IADLs, Painful locomotion/ambulation, Participating in leisure " activities, Performing household chores, Proprioception, Range of motion, Squatting, Standing tolerance, Transfers  Impairments: Abnormal gait, Impaired balance, Activity intolerance, Abnormal or restricted range of motion, Impaired physical strength, Pain with functional activity    Patient Goal for Therapy (PT): Return to prior level of function  Prognosis: Good  Assessment Details: Patient presents 7 weeks s/p lumbar spinal fusion. Presents with TLSO brace donned and aware of all precautions. Presents with decreased lumbar active range of motion, bilateral lower extremity weakness, impaired proprioception/balance, antalgic gait deficits, and poor aerobic tolerance. Right lower extremity weakness greater than left. Right flat foot contact during gait with lack of push off. Also, noted with knee range of motion deficits which could be contributing. Will benefit from physical therapy to return to prior level of function and quality of life.    Plan  From a physical therapy perspective, the patient would benefit from: Skilled Rehab Services    Planned therapy interventions include: Therapeutic exercise, Therapeutic activities, Neuromuscular re-education, Manual therapy, and Gait training.    Planned modalities to include: Cryotherapy (cold pack) and Thermotherapy (hot pack).        Visit Frequency: 2 times Per Week for 8 Weeks.       This plan was discussed with Patient and Therapy assistant.   Discussion participants: Agreed Upon Plan of Care             Patient's spiritual, cultural, and educational needs considered and patient agreeable to plan of care and goals.     Education  Education was done with Patient. The patient's learning style includes Demonstration and Listening. The patient Verbalizes understanding and Demonstrates understanding.                 Goals:   Active       Ambulation/movement       Patient will be able to complete 6 MWT without rest breaks to demonstrate improve tolerance to long duration  walking.       Start:  03/07/25    Expected End:  04/18/25            Patient will no longer use TLSO brace to return to prior level of function.       Start:  03/07/25    Expected End:  04/04/25               Changing body position       Patient will be able to perform 10 repetitions of sit to stands without upper extremity support to improve independence.       Start:  03/07/25    Expected End:  05/02/25               Functional outcome       Patient will demonstrate independence in home program for support of progression       Start:  03/07/25    Expected End:  04/04/25               Home activities       Patient will perform household indoor maintenance without increased pain levels to improve quality of life and independence.       Start:  03/07/25    Expected End:  05/02/25               Pain       Patient will report pain of 2/10 demonstrating a reduction of overall pain       Start:  03/07/25    Expected End:  04/04/25               Strength       Patient will achieve right hip flexion strength of 4+/5       Start:  03/07/25    Expected End:  05/02/25            Patient will achieve right knee extension strength of 5/5       Start:  03/07/25    Expected End:  05/02/25                Alea Gary, PT

## 2025-03-07 PROBLEM — M53.86 DECREASED ROM OF LUMBAR SPINE: Status: ACTIVE | Noted: 2025-03-07

## 2025-03-07 PROBLEM — R29.898 WEAKNESS OF RIGHT LOWER EXTREMITY: Status: ACTIVE | Noted: 2025-03-07

## 2025-03-13 ENCOUNTER — CLINICAL SUPPORT (OUTPATIENT)
Dept: REHABILITATION | Facility: HOSPITAL | Age: 75
End: 2025-03-13
Payer: MEDICARE

## 2025-03-13 DIAGNOSIS — M53.86 DECREASED ROM OF LUMBAR SPINE: Primary | ICD-10-CM

## 2025-03-13 DIAGNOSIS — R29.898 WEAKNESS OF RIGHT LOWER EXTREMITY: ICD-10-CM

## 2025-03-13 PROCEDURE — 97110 THERAPEUTIC EXERCISES: CPT | Mod: PN

## 2025-03-13 PROCEDURE — 97112 NEUROMUSCULAR REEDUCATION: CPT | Mod: PN

## 2025-03-13 PROCEDURE — 97530 THERAPEUTIC ACTIVITIES: CPT | Mod: PN

## 2025-03-13 NOTE — PROGRESS NOTES
"  Outpatient Rehab    Physical Therapy Visit    Patient Name: Octavio Constantino  MRN: [id number]  YOB: 1950  Encounter Date: 3/13/2025    Therapy Diagnosis:   Encounter Diagnoses   Name Primary?    Decreased ROM of lumbar spine Yes    Weakness of right lower extremity      Physician: Cass Contreras, *    Physician Orders: Eval and Treat  Medical Diagnosis: Z98.1 (ICD-10-CM) - S/P lumbar spinal fusion      Visit # / Visits Authorized:  1 / 10 (+1)   Date of Evaluation:  3/6/2025   Insurance Authorization Period: 3/6/2025 to 12/31/2025   Plan of Care Certification:  3/6/2025 to 4/30/2025      Time In:  11:05 am   Time Out:  12:00 pm   Total Time:   55 minutes   Total Billable Time: 55 minutes     FOTO:  Intake Score:  %  Survey Score 1:  %  Survey Score 2:  %         Subjective   He is doing well today. He has been keeping up with HEP and feels like it has been helping. He will get sore, but no pain. Up to 5-6 minutes with walking program but with right leg is quick to fatigue and get painful..  Pain reported as 0/10. lumbar spine    Objective            Treatment:  Therapeutic Exercise  Therapeutic Exercise Activity 1: LTR: 15x (small range, pain-free)  Therapeutic Exercise Activity 2: SKTC: 10x10" B  Therapeutic Exercise Activity 3: DL heel raise: 2x10         Balance/Neuromuscular Re-Education  Balance/Neuromuscular Re-Education Activity 1: Long sitting plantarflexion: blue theraband 3x10  Balance/Neuromuscular Re-Education Activity 2: Long sitting dorsiflexion: green theraband 3x10  Balance/Neuromuscular Re-Education Activity 3: Supine sciatic nerve glides: 2x20 B  Balance/Neuromuscular Re-Education Activity 4: Transverse abdominus activation: 15x5"  Balance/Neuromuscular Re-Education Activity 5: Transverse abdominus activation with SAPD: green band 15x5"  Balance/Neuromuscular Re-Education Activity 6: Seated row: green band 3x10  Balance/Neuromuscular Re-Education Activity 7: Seated SAPD: " green band 3x10  Balance/Neuromuscular Re-Education Activity 8: Seated low multifidi SAPD (palm up): red band 3x10  Balance/Neuromuscular Re-Education Activity 9: Side lying clamshells: green band 2x10    Therapeutic Activity  Therapeutic Activity 1: Nu-step: level 4 for 8 minutes for lower extremity strength and endurance trianing.           Assessment & Plan   Assessment: Patient presents 8 weeks s/p lumbar spinal fusion. Presents with improved pain levels compared to initial evaluation with compliance noted with home exercise program. First follow up consisted of hip mobility, posterolateral and posterior chain neuromuscular re-education, and core stabilization. Excellent performance of all exercises demonstrated without complaints of pain. Difficulty avoiding rocking compensation with heel raises secondary to right plantar flexion weakness. Overall, tolerated first follow up well without any complaints.  Evaluation/Treatment Tolerance: Patient tolerated treatment well    Patient will continue to benefit from skilled outpatient physical therapy to address the deficits listed in the problem list box on initial evaluation, provide pt/family education and to maximize pt's level of independence in the home and community environment.     Patient's spiritual, cultural, and educational needs considered and patient agreeable to plan of care and goals.           Plan: Hip mobility (emphasis on extension and ER), posterolateral hip strengthening, core stabilization routine, increased aerobic tolerance.    Goals:   Active       Ambulation/movement       Patient will be able to complete 6 MWT without rest breaks to demonstrate improve tolerance to long duration walking. (Progressing)       Start:  03/07/25    Expected End:  04/18/25            Patient will no longer use TLSO brace to return to prior level of function. (Progressing)       Start:  03/07/25    Expected End:  04/04/25               Changing body position        Patient will be able to perform 10 repetitions of sit to stands without upper extremity support to improve independence. (Progressing)       Start:  03/07/25    Expected End:  05/02/25               Functional outcome       Patient will demonstrate independence in home program for support of progression (Progressing)       Start:  03/07/25    Expected End:  04/04/25               Home activities       Patient will perform household indoor maintenance without increased pain levels to improve quality of life and independence. (Progressing)       Start:  03/07/25    Expected End:  05/02/25               Pain       Patient will report pain of 2/10 demonstrating a reduction of overall pain (Progressing)       Start:  03/07/25    Expected End:  04/04/25               Strength       Patient will achieve right hip flexion strength of 4+/5 (Progressing)       Start:  03/07/25    Expected End:  05/02/25            Patient will achieve right knee extension strength of 5/5 (Progressing)       Start:  03/07/25    Expected End:  05/02/25                Alea Gary, PT

## 2025-03-14 ENCOUNTER — CLINICAL SUPPORT (OUTPATIENT)
Dept: REHABILITATION | Facility: HOSPITAL | Age: 75
End: 2025-03-14
Payer: MEDICARE

## 2025-03-14 DIAGNOSIS — M53.86 DECREASED ROM OF LUMBAR SPINE: Primary | ICD-10-CM

## 2025-03-14 DIAGNOSIS — R52 PAIN AGGRAVATED BY STANDING: ICD-10-CM

## 2025-03-14 DIAGNOSIS — Z98.1 S/P LUMBAR SPINAL FUSION: ICD-10-CM

## 2025-03-14 DIAGNOSIS — R29.898 WEAKNESS OF RIGHT LOWER EXTREMITY: ICD-10-CM

## 2025-03-14 PROCEDURE — 97110 THERAPEUTIC EXERCISES: CPT | Mod: PN,CQ

## 2025-03-14 PROCEDURE — 97112 NEUROMUSCULAR REEDUCATION: CPT | Mod: PN,CQ

## 2025-03-18 ENCOUNTER — CLINICAL SUPPORT (OUTPATIENT)
Dept: REHABILITATION | Facility: HOSPITAL | Age: 75
End: 2025-03-18
Payer: MEDICARE

## 2025-03-18 DIAGNOSIS — R29.898 WEAKNESS OF RIGHT LOWER EXTREMITY: ICD-10-CM

## 2025-03-18 DIAGNOSIS — M53.86 DECREASED ROM OF LUMBAR SPINE: Primary | ICD-10-CM

## 2025-03-18 DIAGNOSIS — Z98.1 S/P LUMBAR SPINAL FUSION: ICD-10-CM

## 2025-03-18 PROCEDURE — 97530 THERAPEUTIC ACTIVITIES: CPT | Mod: PN,CQ

## 2025-03-18 PROCEDURE — 97112 NEUROMUSCULAR REEDUCATION: CPT | Mod: PN,CQ

## 2025-03-18 NOTE — PROGRESS NOTES
"  Outpatient Rehab    Physical Therapy Visit    Patient Name: Octavio Constantino  MRN: [id number]  YOB: 1950  Encounter Date: 3/18/2025    Therapy Diagnosis: Medical Diagnosis: Z98.1 (ICD-10-CM) - S/P lumbar spinal fusion      Visit # / Visits Authorized:  1 / 10 (+1)   Date of Evaluation:  3/6/2025   Insurance Authorization Period: 3/6/2025 to 12/31/2025   Plan of Care Certification:  3/6/2025 to 4/30/2025                 Time In:  1100  Time Out:  1155  Total Time:   55 minutes   Total Billable Time: 55 minutes   Physician: Cass Contreras, *    Physician Orders: Eval and Treat  Medical Diagnosis: S/P lumbar spinal fusionMedical Diagnosis: Z98.1 (ICD-10-CM) - S/P lumbar spinal fusion      Visit # / Visits Authorized:  1 / 10 (+1)   Date of Evaluation:  3/6/2025   Insurance Authorization Period: 3/6/2025 to 12/31/2025   Plan of Care Certification:  3/6/2025 to 4/30/2025                 Time In:  1100  Time Out:  1155  Total Time:   55 minutes   Total Billable Time: 55 minutes   Medical Diagnosis: Z98.1 (ICD-10-CM) - S/P lumbar spinal fusion      Visit # / Visits Authorized:  1 / 10 (+1)   Date of Evaluation:  3/6/2025   Insurance Authorization Period: 3/6/2025 to 12/31/2025   Plan of Care Certification:  3/6/2025 to 4/30/2025                 Time In:  1200  Time Out:  1255  Total Time:   55 minutes   Total Billable Time: 55 minutes   Visit # / Visits Authorized:  3 / 10      FOTO:  Intake Score:  %  Survey Score 1:  %  Survey Score 2:  %         Subjective   He is doing well today. He has been keeping up with HEP and feels like it has been helping. He will get sore, but no pain. Up to 5-6 minutes with walking program but with right leg is quick to fatigue and loses some control and effeciency..    lumbar spine    Objective            Treatment:  Therapeutic Exercise  TE 1: LTR: 15x (small range, pain-free)  TE 2: SKTC: 10x10" B  TE 3: DL heel raise: 2x10  Balance/Neuromuscular Re-Education  NMR " "1: Long sitting plantarflexion: blue theraband 3x10  NMR 2: Long sitting dorsiflexion: green theraband 3x10  NMR 3: Supine sciatic nerve glides: 2x20 B  NMR 4: Transverse abdominus activation: 15x5"  NMR 5: Transverse abdominus activation with SAPD: Pink Cook  band 15x5"  NMR 6: Seated row: green band 3x10  NMR 7: Seated SAPD: green band 3x10  NMR 8: Seated low multifidi SAPD (palm up): green band 3x10  NMR 9: Side lying clamshells: green band 2x10  NMR 10: Setaed LAQ 3x10-GTB  Therapeutic Activity  TA 1: Nu-step: level 4 for 8 minutes for lower extremity strength and endurance trianing.  TA 2: SKTC: 10x5" B  TA 3: Scaitic nerve glides: 15x B  TA 4: BKFO: 10x5" B-GTB  TA 5: Glute set with bridge: 10x  TA 6: TA brace: 15x5"  TA 7: Hip abduction isometric: 15x5"    Time Entry(in minutes):       Assessment & Plan   Assessment:         Patient will continue to benefit from skilled outpatient physical therapy to address the deficits listed in the problem list box on initial evaluation, provide pt/family education and to maximize pt's level of independence in the home and community environment.     Patient's spiritual, cultural, and educational needs considered and patient agreeable to plan of care and goals.           Plan:      Goals:   Active       Ambulation/movement       Patient will be able to complete 6 MWT without rest breaks to demonstrate improve tolerance to long duration walking. (Progressing)       Start:  03/07/25    Expected End:  04/18/25            Patient will no longer use TLSO brace to return to prior level of function. (Progressing)       Start:  03/07/25    Expected End:  04/04/25               Changing body position       Patient will be able to perform 10 repetitions of sit to stands without upper extremity support to improve independence. (Progressing)       Start:  03/07/25    Expected End:  05/02/25               Functional outcome       Patient will demonstrate independence in home program for " support of progression (Progressing)       Start:  03/07/25    Expected End:  04/04/25               Home activities       Patient will perform household indoor maintenance without increased pain levels to improve quality of life and independence. (Progressing)       Start:  03/07/25    Expected End:  05/02/25               Pain       Patient will report pain of 2/10 demonstrating a reduction of overall pain (Progressing)       Start:  03/07/25    Expected End:  04/04/25               Strength       Patient will achieve right hip flexion strength of 4+/5 (Progressing)       Start:  03/07/25    Expected End:  05/02/25            Patient will achieve right knee extension strength of 5/5 (Progressing)       Start:  03/07/25    Expected End:  05/02/25                Zachary Johnson PTA

## 2025-03-19 ENCOUNTER — EXTERNAL HOME HEALTH (OUTPATIENT)
Dept: HOME HEALTH SERVICES | Facility: HOSPITAL | Age: 75
End: 2025-03-19
Payer: MEDICARE

## 2025-03-20 ENCOUNTER — CLINICAL SUPPORT (OUTPATIENT)
Dept: REHABILITATION | Facility: HOSPITAL | Age: 75
End: 2025-03-20
Payer: MEDICARE

## 2025-03-20 DIAGNOSIS — R52 PAIN AGGRAVATED BY STANDING: ICD-10-CM

## 2025-03-20 DIAGNOSIS — M53.86 DECREASED ROM OF LUMBAR SPINE: Primary | ICD-10-CM

## 2025-03-20 DIAGNOSIS — Z98.1 S/P LUMBAR SPINAL FUSION: ICD-10-CM

## 2025-03-20 DIAGNOSIS — R29.898 WEAKNESS OF RIGHT LOWER EXTREMITY: ICD-10-CM

## 2025-03-20 PROCEDURE — 97112 NEUROMUSCULAR REEDUCATION: CPT | Mod: PN,CQ

## 2025-03-20 PROCEDURE — 97530 THERAPEUTIC ACTIVITIES: CPT | Mod: PN,CQ

## 2025-03-20 NOTE — PROGRESS NOTES
"  Outpatient Rehab    Physical Therapy Visit    Patient Name: Octavio Constantino  MRN: [id number]  YOB: 1950  Encounter Date: 3/20/2025    Therapy Diagnosis:   Encounter Diagnoses   Name Primary?    Decreased ROM of lumbar spine Yes    Weakness of right lower extremity     S/P lumbar spinal fusion     Pain aggravated by standing        Physician: Cass Contreras, *    Physician Orders: Eval and Treat  Medical Diagnosis: S/P lumbar spinal fusion    Visit # / Visits Authorized:  4 / 10  Medical Diagnosis: Z98.1 (ICD-10-CM) - S/P lumbar spinal fusion      Visit # / Visits Authorized:  4 / 10 (+1)   Date of Evaluation:  3/6/2025   Insurance Authorization Period: 3/6/2025 to 12/31/2025   Plan of Care Certification:  3/6/2025 to 4/30/2025                 Time In:  1100  Time Out:  1155  Total Time:   55 minutes   Total Billable Time: 30 minutes   Visit # / Visits Authorized:  4/ 10    FOTO:  Intake Score:  %  Survey Score 1:  %  Survey Score 2:  %         Subjective   "Really suprised me. No pain at all today.".         Objective            Treatment:  Therapeutic Exercise  TE 1: Therapeutic Exercise  TE 1: LTR: 15x (small range, pain-free)  TE 2: SKTC: 10x10" B  TE 3: DL heel raise: 2x10  TE 2: Step ups Fwd to 6" step 2x15 reps B with single UE support  TE 3: Step ups laterally to 6" step 20 w/B UE support  Balance/Neuromuscular Re-Education  NMR 1: Balance/Neuromuscular Re-Education  NMR 1: Long sitting plantarflexion: blue theraband 3x10  NMR 2: Long sitting dorsiflexion: green theraband 3x10  NMR 3: Supine sciatic nerve glides: 2x20 B  NMR 4: Transverse abdominus activation: 15x5"  NMR 5: Transverse abdominus activation with SAPD: Pink Cook  band 15x5"  NMR 6: Seated row: blue band 3x10  NMR 7: Seated SAPD: green band 3x10  NMR 8: Seated low multifidi SAPD (palm up): green band 3x10  NMR 9: Side lying clamshells: green band 2x10  NMR 10: Setaed LAQ 3x10-GTB  Therapeutic Activity  TA 1: Therapeutic " "Activity  TA 1: Nu-step: level 5 for 8 minutes for lower extremity strength and endurance trianing.  TA 2: SKTC: 10x5" B  TA 3: Scaitic nerve glides: 15x B  TA 4: BKFO: 10x5" B-GTB  TA 5: Glute set with bridge: 10x  TA 6: TA brace: 15x5"  TA 7: Hip abduction isometric: 15x5"    Time Entry(in minutes):       Assessment & Plan   Assessment: Tolerated well. Presest with no pain excited pain free.       Patient will continue to benefit from skilled outpatient physical therapy to address the deficits listed in the problem list box on initial evaluation, provide pt/family education and to maximize pt's level of independence in the home and community environment.     Patient's spiritual, cultural, and educational needs considered and patient agreeable to plan of care and goals.           Plan: Continue patient's plan of care      Goals:   Active       Ambulation/movement       Patient will be able to complete 6 MWT without rest breaks to demonstrate improve tolerance to long duration walking. (Progressing)       Start:  03/07/25    Expected End:  04/18/25            Patient will no longer use TLSO brace to return to prior level of function. (Progressing)       Start:  03/07/25    Expected End:  04/04/25               Changing body position       Patient will be able to perform 10 repetitions of sit to stands without upper extremity support to improve independence. (Progressing)       Start:  03/07/25    Expected End:  05/02/25               Functional outcome       Patient will demonstrate independence in home program for support of progression (Progressing)       Start:  03/07/25    Expected End:  04/04/25               Home activities       Patient will perform household indoor maintenance without increased pain levels to improve quality of life and independence. (Progressing)       Start:  03/07/25    Expected End:  05/02/25               Pain       Patient will report pain of 2/10 demonstrating a reduction of overall " pain (Progressing)       Start:  03/07/25    Expected End:  04/04/25               Strength       Patient will achieve right hip flexion strength of 4+/5 (Progressing)       Start:  03/07/25    Expected End:  05/02/25            Patient will achieve right knee extension strength of 5/5 (Progressing)       Start:  03/07/25    Expected End:  05/02/25                Zachary Johnson, PTA

## 2025-03-24 ENCOUNTER — CLINICAL SUPPORT (OUTPATIENT)
Dept: REHABILITATION | Facility: HOSPITAL | Age: 75
End: 2025-03-24
Payer: MEDICARE

## 2025-03-24 DIAGNOSIS — M53.86 DECREASED ROM OF LUMBAR SPINE: Primary | ICD-10-CM

## 2025-03-24 DIAGNOSIS — R29.898 WEAKNESS OF RIGHT LOWER EXTREMITY: ICD-10-CM

## 2025-03-24 PROCEDURE — 97530 THERAPEUTIC ACTIVITIES: CPT | Mod: PN

## 2025-03-24 PROCEDURE — 97112 NEUROMUSCULAR REEDUCATION: CPT | Mod: PN

## 2025-03-24 NOTE — PROGRESS NOTES
Outpatient Rehab    Physical Therapy Visit    Patient Name: Octavio Constantino  MRN: [id number]  YOB: 1950  Encounter Date: 3/24/2025    Therapy Diagnosis:   Encounter Diagnoses   Name Primary?    Decreased ROM of lumbar spine Yes    Weakness of right lower extremity          Physician: Cass Contreras, *    Physician Orders: Eval and Treat  Medical Diagnosis: S/P lumbar spinal fusion    Visit # / Visits Authorized:  5 / 10  Medical Diagnosis: Z98.1 (ICD-10-CM) - S/P lumbar spinal fusion      Visit # / Visits Authorized:  4 / 10 (+1)   Date of Evaluation:  3/6/2025   Insurance Authorization Period: 3/6/2025 to 12/31/2025   Plan of Care Certification:  3/6/2025 to 4/30/2025                 Time In:  1100  Time Out:  1155  Total Time:   55 minutes   Total Billable Time: 30 minutes   Visit # / Visits Authorized:  4/ 10    FOTO:  Intake Score:  %  Survey Score 1:  %  Survey Score 2:  %         Subjective   Constant numbness from ball of his foot and into all his toes on right. Same on the left but not as predominant. He can walk a total of 10 minutes, but his right leg usually gets tired. Has sciatic type pain before surgery down the back of his leg. Denies right knee buckling..  Pain reported as 0/10. lumbar spine and right lower extremity    Objective            Treatment:  Therapeutic Exercise  TE 1: Recumbent bike: hills - levle 5 for 5 minutes  TE 3: Bridges + abduction: green band 3x10  TE 4: Standing hip extension with UE propped on wedge: 2x10  TE 5: Standing hip abduction with UEs propped on wedge: 2x10  Balance/Neuromuscular Re-Education  NMR 1: Seated heel raise: 30# kettle bell 3x15  NMR 6: standing row: blue band 3x15  NMR 7: Standing SAPD: green band 3x15  NMR 8: Standing low multifidi SAPD (palm up): redband 3x10  NMR 9: TA brace with laternating march: 10x  Therapeutic Activity  TA 1: Cybex knee extension: 50# dL 3x10  TA 2: Cybex hamstring curls: 5 plates 3x10    Time Entry(in  minutes):       Assessment & Plan   Assessment: Patient presents 7 weeks s/p lumbar spinal fusion. Presents without TLSO brace donned. Right lower extemity weakness remains. Flat foot initial contact with decreased push off. Unable to toe walk. Instructed patient to inform therapist of right lower extremity weakness increases. Addition of gross lower extremity strengthening via cybex resistance machines with excellent tolerance.       Patient will continue to benefit from skilled outpatient physical therapy to address the deficits listed in the problem list box on initial evaluation, provide pt/family education and to maximize pt's level of independence in the home and community environment.     Patient's spiritual, cultural, and educational needs considered and patient agreeable to plan of care and goals.           Plan: Continue patient's plan of care      Goals:   Active       Ambulation/movement       Patient will be able to complete 6 MWT without rest breaks to demonstrate improve tolerance to long duration walking. (Progressing)       Start:  03/07/25    Expected End:  04/18/25            Patient will no longer use TLSO brace to return to prior level of function. (Progressing)       Start:  03/07/25    Expected End:  04/04/25               Changing body position       Patient will be able to perform 10 repetitions of sit to stands without upper extremity support to improve independence. (Progressing)       Start:  03/07/25    Expected End:  05/02/25               Functional outcome       Patient will demonstrate independence in home program for support of progression (Progressing)       Start:  03/07/25    Expected End:  04/04/25               Home activities       Patient will perform household indoor maintenance without increased pain levels to improve quality of life and independence. (Progressing)       Start:  03/07/25    Expected End:  05/02/25               Pain       Patient will report pain of 2/10  demonstrating a reduction of overall pain (Progressing)       Start:  03/07/25    Expected End:  04/04/25               Strength       Patient will achieve right hip flexion strength of 4+/5 (Progressing)       Start:  03/07/25    Expected End:  05/02/25            Patient will achieve right knee extension strength of 5/5 (Progressing)       Start:  03/07/25    Expected End:  05/02/25                Alea Gary, PT

## 2025-03-27 ENCOUNTER — CLINICAL SUPPORT (OUTPATIENT)
Dept: REHABILITATION | Facility: HOSPITAL | Age: 75
End: 2025-03-27
Payer: MEDICARE

## 2025-03-27 ENCOUNTER — DOCUMENT SCAN (OUTPATIENT)
Dept: HOME HEALTH SERVICES | Facility: HOSPITAL | Age: 75
End: 2025-03-27
Payer: MEDICARE

## 2025-03-27 DIAGNOSIS — R29.898 WEAKNESS OF RIGHT LOWER EXTREMITY: ICD-10-CM

## 2025-03-27 DIAGNOSIS — M53.86 DECREASED ROM OF LUMBAR SPINE: Primary | ICD-10-CM

## 2025-03-27 PROCEDURE — 97112 NEUROMUSCULAR REEDUCATION: CPT | Mod: PN

## 2025-03-27 PROCEDURE — 97530 THERAPEUTIC ACTIVITIES: CPT | Mod: PN

## 2025-03-27 NOTE — PROGRESS NOTES
"  Outpatient Rehab    Physical Therapy Visit    Patient Name: Octavio Constantino  MRN: [id number]  YOB: 1950  Encounter Date: 3/27/2025    Therapy Diagnosis:   No diagnosis found.        Physician: Cass Contreras, *    Physician Orders: Eval and Treat  Medical Diagnosis: S/P lumbar spinal fusion    Visit # / Visits Authorized:  6 / 10  Medical Diagnosis: Z98.1 (ICD-10-CM) - S/P lumbar spinal fusion      Visit # / Visits Authorized:  6/10 (+1)   Date of Evaluation:  3/6/2025   Insurance Authorization Period: 3/6/2025 to 12/31/2025   Plan of Care Certification:  3/6/2025 to 4/30/2025                 Time In: 10:01 am   Time Out: 10:58 am  Total Time: 57 minutes   Total Billable Time: 30 minutes     Visit # / Visits Authorized: 6/10    FOTO:  Intake Score:  %  Survey Score 1:  %  Survey Score 2:  %         Subjective             Objective            Treatment:  Therapeutic Exercise  TE 3: Bridges + abduction: green band 3x10  TE 4: Standing hip extension with UE propped on wedge: 2x10  TE 5: Standing hip abduction with UEs propped on wedge: 2x10  Balance/Neuromuscular Re-Education  NMR 1: Seated heel raise: 30# kettle bell 3x15  NMR 2: Split stance with chest press: 6" step with 2 kg red ball 2x15 B  NMR 3: Lateral band walks: red band 5 laps in // bars  NMR 5: Paloff press: doubled red 2x10 bilateral  NMR 6: standing row: blue band 3x15  NMR 7: Standing SAPD: green band 3x15  NMR 8: Standing low multifidi SAPD (palm up): redband 3x10  NMR 9: TA brace with laternating march: 10x  Therapeutic Activity  TA 1: Cybex knee extension: 50# dL 3x10  TA 2: Cybex hamstring curls: 5 plates 3x10  TA 3: Step ups: 6" step 2x15 reps B with single UE support  TA 4: Recumbent bike: multi hills - level 8.5 for 8 minutes    Time Entry(in minutes):       Assessment & Plan   Assessment: Patient presents 7 weeks s/p lumbar spinal fusion. Presents without TLSO brace donned. Progressed to single leg stabilization training " with quick lateral hip fatigue. Began anti-rotational core stabilization. Will progress to supine sahrmann series in upcoming visits. Continued right flat foot contact with ambulation. Slightly improved with conscious effort to heel strike.  Evaluation/Treatment Tolerance: Patient tolerated treatment well    Patient will continue to benefit from skilled outpatient physical therapy to address the deficits listed in the problem list box on initial evaluation, provide pt/family education and to maximize pt's level of independence in the home and community environment.     Patient's spiritual, cultural, and educational needs considered and patient agreeable to plan of care and goals.           Plan: Continue patient's plan of care Posterior chains stabilization, progress to sahrmann core stability series, posterolateral hip strengthening, progress gross lower extremity strength and endurance.    Goals:   Active       Ambulation/movement       Patient will be able to complete 6 MWT without rest breaks to demonstrate improve tolerance to long duration walking. (Progressing)       Start:  03/07/25    Expected End:  04/18/25            Patient will no longer use TLSO brace to return to prior level of function. (Progressing)       Start:  03/07/25    Expected End:  04/04/25               Changing body position       Patient will be able to perform 10 repetitions of sit to stands without upper extremity support to improve independence. (Progressing)       Start:  03/07/25    Expected End:  05/02/25               Functional outcome       Patient will demonstrate independence in home program for support of progression (Progressing)       Start:  03/07/25    Expected End:  04/04/25               Home activities       Patient will perform household indoor maintenance without increased pain levels to improve quality of life and independence. (Progressing)       Start:  03/07/25    Expected End:  05/02/25               Pain        Patient will report pain of 2/10 demonstrating a reduction of overall pain (Progressing)       Start:  03/07/25    Expected End:  04/04/25               Strength       Patient will achieve right hip flexion strength of 4+/5 (Progressing)       Start:  03/07/25    Expected End:  05/02/25            Patient will achieve right knee extension strength of 5/5 (Progressing)       Start:  03/07/25    Expected End:  05/02/25                Alea Gary, PT

## 2025-04-01 ENCOUNTER — CLINICAL SUPPORT (OUTPATIENT)
Dept: REHABILITATION | Facility: HOSPITAL | Age: 75
End: 2025-04-01
Payer: MEDICARE

## 2025-04-01 DIAGNOSIS — R29.898 WEAKNESS OF RIGHT LOWER EXTREMITY: ICD-10-CM

## 2025-04-01 DIAGNOSIS — M53.86 DECREASED ROM OF LUMBAR SPINE: Primary | ICD-10-CM

## 2025-04-01 PROCEDURE — 97530 THERAPEUTIC ACTIVITIES: CPT | Mod: PN

## 2025-04-01 PROCEDURE — 97112 NEUROMUSCULAR REEDUCATION: CPT | Mod: PN

## 2025-04-01 NOTE — PROGRESS NOTES
"  Outpatient Rehab    Physical Therapy Visit    Patient Name: Octavio Constantino  MRN: [id number]  YOB: 1950  Encounter Date: 4/1/2025    Therapy Diagnosis:   No diagnosis found.        Physician: Cass Contreras, *    Physician Orders: Eval and Treat  Medical Diagnosis: S/P lumbar spinal fusion    Visit # / Visits Authorized:  7 / 22  Medical Diagnosis: Z98.1 (ICD-10-CM) - S/P lumbar spinal fusion      Visit # / Visits Authorized:  6/10 (+1)   Date of Evaluation:  3/6/2025   Insurance Authorization Period: 3/6/2025 to 12/31/2025   Plan of Care Certification:  3/6/2025 to 4/30/2025                 Time In: 10:55 am   Time Out: 12:00 pm  Total Time: 65 minutes   Total Billable Time: 50 minutes - 1:1 physical therapist       Visit # / Visits Authorized: 6/10    FOTO:  Intake Score:  %  Survey Score 1:  %  Survey Score 2:  %         Subjective   Still consistent numbness in his toes on the right foot as well as right lower extremity fatigue with long duration walking. Right lower extremity weakness was occuring prior to surgery, but he did not have constant numbness in his toes prior to surgery. Also, has intermittent left foot tingling..  Pain reported as 0/10. lumbar spine and right lower extremity    Objective            Treatment:  Therapeutic Exercise  TE 1: Light swiss ball hip hinge: 15x5" (pain-free range)  TE 4: Standing hip extension: red band 2x10  TE 5: Standing hip abduction: red band 2x10  TE 6: Hip extension stretch at staircase: 10x10" B  Manual Therapy  MT 1: Right long axis hip distraction  Balance/Neuromuscular Re-Education  NMR 2: Split stance with chest press: 6" step with 2 kg red ball 2x15 B  NMR 3: Lateral band walks: red band 5 laps in // bars  NMR 5: Paloff press: doubled red 2x10 bilateral - OOT next  NMR 6: standing row: black band 3x15  NMR 7: Standing SAPD - level 2: green band 3x15  NMR 8: Standing low multifidi SAPD (palm up): green band 3x10 with 5" hold  NMR 9: " Seated sciatic nerve glides: 2x20 B  Therapeutic Activity  TA 1: Cybex knee extension: 50# dL 3x10  TA 2: Cybex hamstring curls: 6 plates 3x10 dL  TA 4: Recumbent bike: multi hills - level 8.5 for 8 minutes  TA 5: Wall squats: 2x10    Time Entry(in minutes):  Manual Therapy Time Entry: 5  Neuromuscular Re-Education Time Entry: 30  Therapeutic Activity Time Entry: 20  Therapeutic Exercise Time Entry: 10    Assessment & Plan   Assessment: Patient presents 8 weeks s/p lumbar spinal fusion. Presents without TLSO brace donned. Continued constant right foot numbness with intermittent numbness in left foot. Numbness sensation not worsening nor improving either. Staying consistent. Progressed resistance difficutly with postural stabilization with good tolerance. Added resistance with posterolateral hip strengthening. No increased lumbar spine pain. Will monitor continued numbness in bilateral lower extremities.  Evaluation/Treatment Tolerance: Patient tolerated treatment well    Patient will continue to benefit from skilled outpatient physical therapy to address the deficits listed in the problem list box on initial evaluation, provide pt/family education and to maximize pt's level of independence in the home and community environment.     Patient's spiritual, cultural, and educational needs considered and patient agreeable to plan of care and goals.           Plan: Continue patient's plan of care Posterior chains stabilization, progress to sahrmann core stability series, posterolateral hip strengthening, progress gross lower extremity strength and endurance.    Goals:   Active       Ambulation/movement       Patient will be able to complete 6 MWT without rest breaks to demonstrate improve tolerance to long duration walking. (Progressing)       Start:  03/07/25    Expected End:  04/18/25            Patient will no longer use TLSO brace to return to prior level of function. (Progressing)       Start:  03/07/25    Expected  End:  04/04/25               Changing body position       Patient will be able to perform 10 repetitions of sit to stands without upper extremity support to improve independence. (Progressing)       Start:  03/07/25    Expected End:  05/02/25               Functional outcome       Patient will demonstrate independence in home program for support of progression (Progressing)       Start:  03/07/25    Expected End:  04/04/25               Home activities       Patient will perform household indoor maintenance without increased pain levels to improve quality of life and independence. (Progressing)       Start:  03/07/25    Expected End:  05/02/25               Pain       Patient will report pain of 2/10 demonstrating a reduction of overall pain (Progressing)       Start:  03/07/25    Expected End:  04/04/25               Strength       Patient will achieve right hip flexion strength of 4+/5 (Progressing)       Start:  03/07/25    Expected End:  05/02/25            Patient will achieve right knee extension strength of 5/5 (Progressing)       Start:  03/07/25    Expected End:  05/02/25                Alea Gary, PT

## 2025-04-03 ENCOUNTER — CLINICAL SUPPORT (OUTPATIENT)
Dept: REHABILITATION | Facility: HOSPITAL | Age: 75
End: 2025-04-03
Payer: MEDICARE

## 2025-04-03 DIAGNOSIS — R29.898 WEAKNESS OF RIGHT LOWER EXTREMITY: ICD-10-CM

## 2025-04-03 DIAGNOSIS — Z98.1 S/P LUMBAR SPINAL FUSION: ICD-10-CM

## 2025-04-03 DIAGNOSIS — M53.86 DECREASED ROM OF LUMBAR SPINE: Primary | ICD-10-CM

## 2025-04-03 PROCEDURE — 97112 NEUROMUSCULAR REEDUCATION: CPT | Mod: PN,CQ

## 2025-04-03 PROCEDURE — 97140 MANUAL THERAPY 1/> REGIONS: CPT | Mod: PN,CQ

## 2025-04-03 PROCEDURE — 97530 THERAPEUTIC ACTIVITIES: CPT | Mod: PN,CQ

## 2025-04-03 NOTE — PROGRESS NOTES
"  Outpatient Rehab    Physical Therapy Visit    Patient Name: Octavio Constantino  MRN: [id number]  YOB: 1950  Encounter Date: 4/3/2025    Therapy Diagnosis:   Encounter Diagnoses   Name Primary?    Decreased ROM of lumbar spine Yes    Weakness of right lower extremity     S/P lumbar spinal fusion        Physician: Cass Contreras, *    Physician Orders: Eval and Treat  Medical Diagnosis: S/P lumbar spinal fusion    Visit # / Visits Authorized:  8 / 22  Insurance Authorization Period: 3/13/2025 to 12/31/2025  Date of Evaluation:  3/6/2025   Plan of Care Certification:  3/6/2025 to 4/30/2025      PT/PTA:     Number of PTA visits since last PT visit:   Time In: 1200    Time Out:1255    Total Time: 55    Total Billable Time:  55    FOTO:  Intake Score:  %  Survey Score 1:  %  Survey Score 2:  %         Subjective  "Doing alright. Do my home exercise program. "            Objective            Treatment:    Therapeutic Exercise  TE 1: Light swiss ball hip hinge: 15x5" (pain-free range)  TE 4: Standing hip extension: red band 2x10  TE 5: Standing hip abduction: red band 2x10  TE 6: Hip extension stretch at staircase: 10x10" B  Manual Therapy  MT 1: Right long axis hip distraction  Balance/Neuromuscular Re-Education  NMR 2: Split stance with chest press: 6" step with 4 kg yellow ball 2x15 B  NMR 3: Lateral band walks: red band 5 laps in  open area  NMR 5: Paloff press: doubled red 2x10 bilateral -   NMR 6: standing row: black band 3x15  NMR 7: Standing SAPD - level 2: green band 3x15  NMR 8: Standing low multifidi SAPD (palm up): green band 3x10 with 5" hold  NMR 9: Seated sciatic nerve glides: 2x20 B  Therapeutic Activity  TA 1: Cybex knee extension: 50# dL 3x10  TA 2: Cybex hamstring curls: 6 plates 3x10 dL  TA 4: Upright stationary bike: L2 for 5 minutes           Assessment & Plan   Assessment:  Patient presents 9 weeks s/p lumbar spinal fusion. Presents without TLSO brace donned. Continued constant " right foot numbness with intermittent numbness in left foot. Progressed resistance difficutly with postural stabilization with good tolerance. Added resistance with posterolateral hip strengthening. No increased lumbar spine pain. Will monitor continued numbness in bilateral lower extremities.       Patient will continue to benefit from skilled outpatient physical therapy to address the deficits listed in the problem list box on initial evaluation, provide pt/family education and to maximize pt's level of independence in the home and community environment.     Patient's spiritual, cultural, and educational needs considered and patient agreeable to plan of care and goals.           Plan: Continue plan of care      Goals:   Active       Ambulation/movement       Patient will be able to complete 6 MWT without rest breaks to demonstrate improve tolerance to long duration walking. (Progressing)       Start:  03/07/25    Expected End:  04/18/25            Patient will no longer use TLSO brace to return to prior level of function. (Progressing)       Start:  03/07/25    Expected End:  04/04/25               Changing body position       Patient will be able to perform 10 repetitions of sit to stands without upper extremity support to improve independence. (Progressing)       Start:  03/07/25    Expected End:  05/02/25               Functional outcome       Patient will demonstrate independence in home program for support of progression (Progressing)       Start:  03/07/25    Expected End:  04/04/25               Home activities       Patient will perform household indoor maintenance without increased pain levels to improve quality of life and independence. (Progressing)       Start:  03/07/25    Expected End:  05/02/25               Pain       Patient will report pain of 2/10 demonstrating a reduction of overall pain (Progressing)       Start:  03/07/25    Expected End:  04/04/25               Strength       Patient will  achieve right hip flexion strength of 4+/5 (Progressing)       Start:  03/07/25    Expected End:  05/02/25            Patient will achieve right knee extension strength of 5/5 (Progressing)       Start:  03/07/25    Expected End:  05/02/25                Zachary Johnson PTA

## 2025-04-10 ENCOUNTER — CLINICAL SUPPORT (OUTPATIENT)
Dept: REHABILITATION | Facility: HOSPITAL | Age: 75
End: 2025-04-10
Payer: MEDICARE

## 2025-04-10 DIAGNOSIS — R52 PAIN AGGRAVATED BY STANDING: ICD-10-CM

## 2025-04-10 DIAGNOSIS — R29.898 WEAKNESS OF RIGHT LOWER EXTREMITY: ICD-10-CM

## 2025-04-10 DIAGNOSIS — Z98.1 S/P LUMBAR SPINAL FUSION: ICD-10-CM

## 2025-04-10 DIAGNOSIS — M53.86 DECREASED ROM OF LUMBAR SPINE: Primary | ICD-10-CM

## 2025-04-10 PROCEDURE — 97112 NEUROMUSCULAR REEDUCATION: CPT | Mod: PN,CQ

## 2025-04-10 PROCEDURE — 97110 THERAPEUTIC EXERCISES: CPT | Mod: PN,CQ

## 2025-04-10 NOTE — PROGRESS NOTES
"  Outpatient Rehab    Physical Therapy Visit    Patient Name: Octavio Constantino  MRN: [id number]  YOB: 1950  Encounter Date: 4/10/2025    Therapy Diagnosis:   Encounter Diagnoses   Name Primary?    Decreased ROM of lumbar spine Yes    Weakness of right lower extremity     S/P lumbar spinal fusion     Pain aggravated by standing        Physician: Cass Contreras, *    Physician Orders: Eval and Treat  Medical Diagnosis: S/P lumbar spinal fusion    Visit # / Visits Authorized:  9 / 22  Insurance Authorization Period: 3/13/2025 to 12/31/2025  Date of Evaluation: 3/6/2025   Plan of Care Certification:  3/6/2025 to 4/30/2025      PT/PTA:     Number of PTA visits since last PT visit:   Time In:   1100  Time Out:  1155  Total Time:  55   Total Billable Time: 30     FOTO:  Intake Score:  %  Survey Score 1:  %  Survey Score 2:  %         Subjective  No pain doing well. Doing the home exercise program.             Objective            Treatment:     Therapeutic Exercise  TE 1: Light swiss ball hip hinge: 15x5" (pain-free range)  TE 4: Standing hip extension: red band 2x10  TE 5: Standing hip abduction: red band 2x10  TE 6: Hip extension stretch at staircase: 10x10" B  Manual Therapy NP  MT 1: Right long axis hip distraction  Balance/Neuromuscular Re-Education  NMR 2: Split stance with chest press: 6" step with 4 kg yellow ball 2x15 B  NMR 3: Lateral band walks: red band 5 laps in  open area  NMR 5: Paloff press: doubled red 2x10 bilateral -   NMR 6: standing row: black band 3x15  NMR 7: Standing SAPD - level 2: green band 3x15  NMR 8: Standing low multifidi SAPD (palm up): green band 3x10 with 5" hold  NMR 9: Seated sciatic nerve glides: 2x20 B  Therapeutic Activity  TA 1: Cybex knee extension: 50# dL 3x10  TA 2: Cybex hamstring curls: 6 plates 3x10 dL  TA 4: Upright stationary bike: L2 for 5 minutes  TA 5: Standing heel raises 3x10 on edge of parallel bar platform  TA 6: Sit to stand 3x10 reps teal chair, " no upper extremity support    Assessment & Plan   Assessment:  Patient presents 9 weeks s/p lumbar spinal fusion. Presents without TLSO brace donned. Continued constant right foot numbness with intermittent numbness in left foot. Progressed resistance difficutly with postural stabilization with good tolerance. Added resistance with posterolateral hip strengthening. No increased lumbar spine pain. Will monitor continued numbness in bilateral lower extremities.        Patient will continue to benefit from skilled outpatient physical therapy to address the deficits listed in the problem list box on initial evaluation, provide pt/family education and to maximize pt's level of independence in the home and community environment.     Patient's spiritual, cultural, and educational needs considered and patient agreeable to plan of care and goals.           Plan:  Continue plan of care     Goals:   Active       Ambulation/movement       Patient will be able to complete 6 MWT without rest breaks to demonstrate improve tolerance to long duration walking. (Progressing)       Start:  03/07/25    Expected End:  04/18/25            Patient will no longer use TLSO brace to return to prior level of function. (Progressing)       Start:  03/07/25    Expected End:  04/04/25               Changing body position       Patient will be able to perform 10 repetitions of sit to stands without upper extremity support to improve independence. (Progressing)       Start:  03/07/25    Expected End:  05/02/25               Functional outcome       Patient will demonstrate independence in home program for support of progression (Progressing)       Start:  03/07/25    Expected End:  04/04/25               Home activities       Patient will perform household indoor maintenance without increased pain levels to improve quality of life and independence. (Progressing)       Start:  03/07/25    Expected End:  05/02/25               Pain       Patient will  report pain of 2/10 demonstrating a reduction of overall pain (Progressing)       Start:  03/07/25    Expected End:  04/04/25               Strength       Patient will achieve right hip flexion strength of 4+/5 (Progressing)       Start:  03/07/25    Expected End:  05/02/25            Patient will achieve right knee extension strength of 5/5 (Progressing)       Start:  03/07/25    Expected End:  05/02/25                Zachary Johnson PTA

## 2025-04-15 ENCOUNTER — CLINICAL SUPPORT (OUTPATIENT)
Dept: REHABILITATION | Facility: HOSPITAL | Age: 75
End: 2025-04-15
Payer: MEDICARE

## 2025-04-15 DIAGNOSIS — M53.86 DECREASED ROM OF LUMBAR SPINE: Primary | ICD-10-CM

## 2025-04-15 DIAGNOSIS — R29.898 WEAKNESS OF RIGHT LOWER EXTREMITY: ICD-10-CM

## 2025-04-15 PROCEDURE — 97530 THERAPEUTIC ACTIVITIES: CPT | Mod: PN

## 2025-04-15 PROCEDURE — 97112 NEUROMUSCULAR REEDUCATION: CPT | Mod: PN

## 2025-04-15 NOTE — PROGRESS NOTES
"    Outpatient Rehab    Physical Therapy Visit    Patient Name: Octavio Constantino  MRN: [id number]  YOB: 1950  Encounter Date: 4/15/2025    Therapy Diagnosis:   Encounter Diagnoses   Name Primary?    Decreased ROM of lumbar spine Yes    Weakness of right lower extremity        Physician: Cass Contreras, *    Physician Orders: Eval and Treat  Medical Diagnosis: S/P lumbar spinal fusion    Visit # / Visits Authorized:  10 / 22  Insurance Authorization Period: 3/13/2025 to 2025  Date of Evaluation: 3/6/2025   Plan of Care Certification:  3/6/2025 to 2025      PT/PTA:   1  Number of PTA visits since last PT visit: 2  Time In:   12:00 pm  Time Out:  12:57 pm  Total Time:  57  Total Billable Time: 30     FOTO:  Intake Score:  %  Survey Score 1:  %  Survey Score 2:  %         Subjective  No pain doing well. Doing the home exercise program.   After 30-40 steps, he gets fatigued in his calf. Feels like he cant push off. It's not a cramping or tingling, but just fatigued. His leg gets heavy. Continued constant numbness in dorsal aspect right toes and intermittent into left toes. didnt have tingling before surgery..  Pain reported as 2/10. lumbar spine and right lower extremity    Objective          4/15/2025   Toe walking: (+) unable   Reflex testin - patellar and achilles      Lower Extremity Strength                 LE           Right           Left   Hip flexion: 4-/5 4+/5   Knee flexion 4-/5 4/5   Knee extension 4+/5 4+/5   Ankle dorsiflexion: 4-/5 4+/5   Ankle plantarflexion: 4-/5 5/5         Treatment:  Therapeutic Exercise  TE 1: SKTC: 10x5"  Balance/Neuromuscular Re-Education  NMR 3: Lateral band walks: green band 5 laps in // bars  NMR 4: Supine nerve glides: 2x20 B  NMR 5: Paloff press with NO ROTATION: doubled red 2x10 bilateral  NMR 7: Standing SAPD - level 2: green band 3x15  NMR 8: Standing low multifidi SAPD (palm up): green band 3x10 with 5" hold  NMR 9: Standing hip " "exension and abduction: 3x10 B  NMR 10: Side lying hip abduction: 2x10  Therapeutic Activity  TA 1: objective test and measure  TA 6: Standing straight arm plank at mat: 3x30"  TA 7: Cable column resistance walkin# 5 laps forwards/backwards    Assessment & Plan   Assessment: Patient presents 3 months s/p lumbar spinal fusion. Continued constant right foot numbness with intermittent numbness in left foot. Numbness sensation not worsening nor improving either. Difficulty with heel raise. Unable to toe walk. Correlated S1 weakness down right lower extremity. Concerns for S1 nerve root compression post-operatively. Pain levels doing great and managed, but dorsum foot tingling remains biggest concern. Progressed core stabilization routine this visit with resisted cable walks and standing planks. Quick lateral hip fatigue with standing hip strenghtening.      Patient will continue to benefit from skilled outpatient physical therapy to address the deficits listed in the problem list box on initial evaluation, provide pt/family education and to maximize pt's level of independence in the home and community environment.     Patient's spiritual, cultural, and educational needs considered and patient agreeable to plan of care and goals.           Plan: Posterior chains stabilization, progress to sahrmann core stability series, posterolateral hip strengthening, progress gross lower extremity strength and endurance.    Goals:   Active       Ambulation/movement       Patient will be able to complete 6 MWT without rest breaks to demonstrate improve tolerance to long duration walking. (Progressing)       Start:  25    Expected End:  25            Patient will no longer use TLSO brace to return to prior level of function. (Progressing)       Start:  25    Expected End:  25               Changing body position       Patient will be able to perform 10 repetitions of sit to stands without upper extremity " support to improve independence. (Progressing)       Start:  03/07/25    Expected End:  05/02/25               Functional outcome       Patient will demonstrate independence in home program for support of progression (Progressing)       Start:  03/07/25    Expected End:  04/04/25               Home activities       Patient will perform household indoor maintenance without increased pain levels to improve quality of life and independence. (Progressing)       Start:  03/07/25    Expected End:  05/02/25               Pain       Patient will report pain of 2/10 demonstrating a reduction of overall pain (Progressing)       Start:  03/07/25    Expected End:  04/04/25               Strength       Patient will achieve right hip flexion strength of 4+/5 (Progressing)       Start:  03/07/25    Expected End:  05/02/25            Patient will achieve right knee extension strength of 5/5 (Progressing)       Start:  03/07/25    Expected End:  05/02/25                Alea Gary, PT

## 2025-04-17 ENCOUNTER — CLINICAL SUPPORT (OUTPATIENT)
Dept: REHABILITATION | Facility: HOSPITAL | Age: 75
End: 2025-04-17
Payer: MEDICARE

## 2025-04-17 DIAGNOSIS — R29.898 WEAKNESS OF RIGHT LOWER EXTREMITY: ICD-10-CM

## 2025-04-17 DIAGNOSIS — M53.86 DECREASED ROM OF LUMBAR SPINE: Primary | ICD-10-CM

## 2025-04-17 PROCEDURE — 97530 THERAPEUTIC ACTIVITIES: CPT | Mod: PN

## 2025-04-21 ENCOUNTER — CLINICAL SUPPORT (OUTPATIENT)
Dept: REHABILITATION | Facility: HOSPITAL | Age: 75
End: 2025-04-21
Payer: MEDICARE

## 2025-04-21 DIAGNOSIS — M53.86 DECREASED ROM OF LUMBAR SPINE: Primary | ICD-10-CM

## 2025-04-21 DIAGNOSIS — R29.898 WEAKNESS OF RIGHT LOWER EXTREMITY: ICD-10-CM

## 2025-04-21 PROCEDURE — 97530 THERAPEUTIC ACTIVITIES: CPT | Mod: PN

## 2025-04-21 NOTE — PROGRESS NOTES
"    Outpatient Rehab    Physical Therapy Visit    Patient Name: Octavio Constantino  MRN: [id number]  YOB: 1950  Encounter Date: 2025    Therapy Diagnosis:   Encounter Diagnoses   Name Primary?    Decreased ROM of lumbar spine Yes    Weakness of right lower extremity        Physician: Cass Contreras, *    Physician Orders: Eval and Treat  Medical Diagnosis: S/P lumbar spinal fusion    Visit # / Visits Authorized:    Insurance Authorization Period: 3/13/2025 to 2025  Date of Evaluation: 3/6/2025   Plan of Care Certification:  3/6/2025 to 2025      PT/PTA:    Number of PTA visits since last PT visit:   Time In: 0300   Time Out: 0355  Total Time: 55  Total Billable Time: 30    FOTO:  Intake Score:  %  Survey Score 1:  %  Survey Score 2:  %         Subjective   Continued constant numbness in dorsal aspect right toes and intermittent into left toes.  Pain reported as 1/10. lumbar spine and right lower extremity    Objective          4/15/2025   Toe walking: (+) unable   Reflex testin - patellar and achilles      Lower Extremity Strength                 LE           Right           Left   Hip flexion: 4-/5 4+/5   Knee flexion 4-/5 4/5   Knee extension 4+/5 4+/5   Ankle dorsiflexion: 4-/5 4+/5   Ankle plantarflexion: 4-/5 5/5         Treatment:  Therapeutic Exercise  TE 3: Bridges + abduction: green band 3x10  Balance/Neuromuscular Re-Education  NMR 4: Supine nerve glides: 2x20 B  NMR 7: Standing SAPD - level 2: green band 3x15  NMR 8: Standing low multifidi SAPD (palm up): green band 3x10 with 5" hold  Therapeutic Activity  TA 1: Cybex knee extensions: 10# 3x10 sL  TA 2: Cybex hamstring curls: 4 plates 3x10 sL  TA 3: Step ups with contralateral dumbbell hold: 6# and 6" step 20x B with single UE support  TA 4: Forward gilberto step overs: 8" gilberto 5 lap  TA 5: Lateral gilberto step overs: 8" gilberto 5 laps  TA 6: dL heel raise on closed fitter: 3x10  TA 7: Cable column resistance " walkin# 5 laps forwards/backwards  TA 8: Seated rows: 7# 3x10    Assessment & Plan   Assessment: Patient presents 3 months s/p lumbar spinal fusion. Continued constant right foot numbness with intermittent numbness in left foot. Numbness sensation not worsening nor improving either. Progressed to single leg cybex resistance training to promote gross lower extremity strengthening to improve lower extrmeity fatigue while ambulating. Still considering lower extremity numbness/weakness coming from L5-S1 neural compression secondary to myotomal weakness, hyporeflexia, and unable to toe walk. Follow up with surgeon soon.      Patient will continue to benefit from skilled outpatient physical therapy to address the deficits listed in the problem list box on initial evaluation, provide pt/family education and to maximize pt's level of independence in the home and community environment.     Patient's spiritual, cultural, and educational needs considered and patient agreeable to plan of care and goals.           Plan: Posterior chains stabilization, progress to sahrmann core stability series, posterolateral hip strengthening, progress gross lower extremity strength and endurance.    Goals:   Active       Ambulation/movement       Patient will be able to complete 6 MWT without rest breaks to demonstrate improve tolerance to long duration walking. (Progressing)       Start:  25    Expected End:  25            Patient will no longer use TLSO brace to return to prior level of function. (Progressing)       Start:  25    Expected End:  25               Changing body position       Patient will be able to perform 10 repetitions of sit to stands without upper extremity support to improve independence. (Progressing)       Start:  25    Expected End:  25               Functional outcome       Patient will demonstrate independence in home program for support of progression (Progressing)        Start:  03/07/25    Expected End:  04/04/25               Home activities       Patient will perform household indoor maintenance without increased pain levels to improve quality of life and independence. (Progressing)       Start:  03/07/25    Expected End:  05/02/25               Pain       Patient will report pain of 2/10 demonstrating a reduction of overall pain (Progressing)       Start:  03/07/25    Expected End:  04/04/25               Strength       Patient will achieve right hip flexion strength of 4+/5 (Progressing)       Start:  03/07/25    Expected End:  05/02/25            Patient will achieve right knee extension strength of 5/5 (Progressing)       Start:  03/07/25    Expected End:  05/02/25                Alea Gary, PT

## 2025-04-23 NOTE — PROGRESS NOTES
"    Outpatient Rehab    Physical Therapy Visit    Patient Name: Octavio Constantino  MRN: [id number]  YOB: 1950  Encounter Date: 2025    Therapy Diagnosis:   Encounter Diagnoses   Name Primary?    Decreased ROM of lumbar spine Yes    Weakness of right lower extremity        Physician: Cass Contreras, *    Physician Orders: Eval and Treat  Medical Diagnosis: S/P lumbar spinal fusion    Visit # / Visits Authorized:    Insurance Authorization Period: 3/13/2025 to 2025  Date of Evaluation: 3/6/2025   Plan of Care Certification:  3/6/2025 to 2025      PT/PTA:    Number of PTA visits since last PT visit:   Time In: 1100   Time Out: 1155  Total Time: 55  Total Billable Time: 30    FOTO:  Intake Score:  %  Survey Score 1:  %  Survey Score 2:  %         Subjective   No change in constant numbness in dorsal aspect right toes and intermittent into left toes. Right lower extremity continues to get fatigued..  Pain reported as 1/10. lumbar spine and right lower extremity    Objective          4/15/2025   Toe walking: (+) unable   Reflex testin - patellar and achilles      Lower Extremity Strength                 LE           Right           Left   Hip flexion: 4-/5 4+/5   Knee flexion 4-/5 4/5   Knee extension 4+/5 4+/5   Ankle dorsiflexion: 4-/5 4+/5   Ankle plantarflexion: 4-/5 5/5         Treatment:  Therapeutic Exercise  TE 4: Standing hip extension: red band 3x10  TE 5: Standing hip abduction: red band 3x10  TE 6: Hip extension stretch at staircase: 10x10" B  Balance/Neuromuscular Re-Education  NMR 3: Lateral band walks: green band 5 laps in // bars  NMR 5: Paloff press with NO ROTATION: free motion 3# - 2x10 bilateral  NMR 6: standing row: 7# free motion 3x10  Therapeutic Activity  TA 1: Cybex knee extensions: 10# 3x10 sL  TA 2: Cybex hamstring curls: 4 plates 3x10 sL  TA 3: Step ups with contralateral dumbbell hold: 6# and 6" step 20x B with single UE support  TA 4: Forward " "gilberto step overs: 8" gilberto 5 lap  TA 5: Lateral gilberto step overs: 8" gilberto 5 laps  TA 7: Cable column resistance walkin# - 5 laps forwards/backwards  TA 8: Seated rows: 7# 3x10  TA 9: Nu step: multi hill - level 7 for 8 minutes  TA 10: Standing plank on elevated mat (straight arm): 3x30"    Assessment & Plan   Assessment: Patient presents 3 months s/p lumbar spinal fusion. Continued constant right foot numbness with intermittent numbness in left foot. Numbness sensation not worsening nor improving either. Continued single leg cybex resistance training to promote gross lower extremity strengthening to improve lower extrmeity fatigue while ambulating. Continued higher level dynamic core and posterior chain stablization via cable machines.  Still considering lower extremity numbness/weakness coming from L5-S1 neural compression secondary to myotomal weakness, hyporeflexia, and unable to toe walk. Less circumduction compensation with gilberto step overs compared to previous session.      Patient will continue to benefit from skilled outpatient physical therapy to address the deficits listed in the problem list box on initial evaluation, provide pt/family education and to maximize pt's level of independence in the home and community environment.     Patient's spiritual, cultural, and educational needs considered and patient agreeable to plan of care and goals.           Plan: Posterior chains stabilization, progress to sahrmann core stability series, posterolateral hip strengthening, progress gross lower extremity strength and endurance.    Goals:   Active       Ambulation/movement       Patient will be able to complete 6 MWT without rest breaks to demonstrate improve tolerance to long duration walking. (Progressing)       Start:  25    Expected End:  25            Patient will no longer use TLSO brace to return to prior level of function. (Progressing)       Start:  25    Expected End:  25  "              Changing body position       Patient will be able to perform 10 repetitions of sit to stands without upper extremity support to improve independence. (Progressing)       Start:  03/07/25    Expected End:  05/02/25               Functional outcome       Patient will demonstrate independence in home program for support of progression (Progressing)       Start:  03/07/25    Expected End:  04/04/25               Home activities       Patient will perform household indoor maintenance without increased pain levels to improve quality of life and independence. (Progressing)       Start:  03/07/25    Expected End:  05/02/25               Pain       Patient will report pain of 2/10 demonstrating a reduction of overall pain (Progressing)       Start:  03/07/25    Expected End:  04/04/25               Strength       Patient will achieve right hip flexion strength of 4+/5 (Progressing)       Start:  03/07/25    Expected End:  05/02/25            Patient will achieve right knee extension strength of 5/5 (Progressing)       Start:  03/07/25    Expected End:  05/02/25                Alea Gary, PT

## 2025-04-24 ENCOUNTER — CLINICAL SUPPORT (OUTPATIENT)
Dept: REHABILITATION | Facility: HOSPITAL | Age: 75
End: 2025-04-24
Payer: MEDICARE

## 2025-04-24 DIAGNOSIS — M53.86 DECREASED ROM OF LUMBAR SPINE: Primary | ICD-10-CM

## 2025-04-24 DIAGNOSIS — R29.898 WEAKNESS OF RIGHT LOWER EXTREMITY: ICD-10-CM

## 2025-04-24 PROCEDURE — 97530 THERAPEUTIC ACTIVITIES: CPT | Mod: PN

## 2025-04-24 NOTE — PROGRESS NOTES
"    Outpatient Rehab    Physical Therapy Visit    Patient Name: Octavio Constantino  MRN: [id number]  YOB: 1950  Encounter Date: 2025    Therapy Diagnosis:   Encounter Diagnoses   Name Primary?    Decreased ROM of lumbar spine Yes    Weakness of right lower extremity        Physician: Cass Contreras, *    Physician Orders: Eval and Treat  Medical Diagnosis: S/P lumbar spinal fusion    Visit # / Visits Authorized:    Insurance Authorization Period: 3/13/2025 to 2025  Date of Evaluation: 3/6/2025   Plan of Care Certification:  3/6/2025 to 2025      PT/PTA: PT  Number of PTA visits since last PT visit:0  Time In: 1300   Time Out: 1355  Total Time: 55  Total Billable Time: 30    FOTO:  Intake Score:  %  Survey Score 1:  %  Survey Score 2:  %         Subjective   No change in constant numbness in dorsal aspect right toes and intermittent into left toes. Right lower extremity continues to get fatigued. No low back pain.    lumbar spine and right lower extremity    Objective          4/15/2025   Toe walking: (+) unable   Reflex testin - patellar and achilles      Lower Extremity Strength                 LE           Right           Left   Hip flexion: 4-/5 4+/5   Knee flexion 4-/5 4/5   Knee extension 4+/5 4+/5   Ankle dorsiflexion: 4-/5 4+/5   Ankle plantarflexion: 4-/5 5/5         Treatment:  Balance/Neuromuscular Re-Education  NMR 7: Standing SAPD - level 2: green band 3x15 B  NMR 8: Standing low multifidi SAPD (palm up): green band 3x10 with 5" hold  NMR 9: Standing diagonal chops: green band 2x10 with bilateral upper extremities  NMR 10: D1 diagonal lifts: green band 2x10 with bilateral upper extremities  Therapeutic Activity  TA 1: Cybex knee extensions: 10# 3x10 sL  TA 2: Cybex hamstring curls: 4 plates 3x10 sL  TA 7: Cable column resistance walkin# - 5 laps forwards/backwards  TA 8: Side plank on elbow at elevated mat (1st ring): 2x20" B  TA 9: Sit to stands with " "overhead press: blue ball 3x10 from black chair  TA 10: Standing plank on elevated mat (straight arm): 3x30"    Assessment & Plan   Assessment: Patient presents 3 months s/p lumbar spinal fusion. Continued constant right foot numbness with intermittent numbness in left foot. Numbness sensation not worsening nor improving either. Despite this, no longer experiencing any low back discomfort. Progresed to chop and lifts to implement higher level core and posterior chain stabilization. No discomfort verablized. More difficulty with left side planks > right side planks. Cardiovascular fatigue with sit to stands with overhead press, but able to perform all 3 sets without upper extremity support. Follow up with surgeon next week.      Patient will continue to benefit from skilled outpatient physical therapy to address the deficits listed in the problem list box on initial evaluation, provide pt/family education and to maximize pt's level of independence in the home and community environment.     Patient's spiritual, cultural, and educational needs considered and patient agreeable to plan of care and goals.           Plan: Posterior chains stabilization, progress to sahrmann core stability series, posterolateral hip strengthening, progress gross lower extremity strength and endurance.    Goals:   Active       Ambulation/movement       Patient will be able to complete 6 MWT without rest breaks to demonstrate improve tolerance to long duration walking. (Progressing)       Start:  03/07/25    Expected End:  04/18/25            Patient will no longer use TLSO brace to return to prior level of function. (Progressing)       Start:  03/07/25    Expected End:  04/04/25               Changing body position       Patient will be able to perform 10 repetitions of sit to stands without upper extremity support to improve independence. (Progressing)       Start:  03/07/25    Expected End:  05/02/25               Functional outcome       " Patient will demonstrate independence in home program for support of progression (Progressing)       Start:  03/07/25    Expected End:  04/04/25               Home activities       Patient will perform household indoor maintenance without increased pain levels to improve quality of life and independence. (Progressing)       Start:  03/07/25    Expected End:  05/02/25               Pain       Patient will report pain of 2/10 demonstrating a reduction of overall pain (Progressing)       Start:  03/07/25    Expected End:  04/04/25               Strength       Patient will achieve right hip flexion strength of 4+/5 (Progressing)       Start:  03/07/25    Expected End:  05/02/25            Patient will achieve right knee extension strength of 5/5 (Progressing)       Start:  03/07/25    Expected End:  05/02/25                Alea Gary, PT

## 2025-04-28 ENCOUNTER — CLINICAL SUPPORT (OUTPATIENT)
Dept: REHABILITATION | Facility: HOSPITAL | Age: 75
End: 2025-04-28
Payer: MEDICARE

## 2025-04-28 DIAGNOSIS — M53.86 DECREASED ROM OF LUMBAR SPINE: Primary | ICD-10-CM

## 2025-04-28 DIAGNOSIS — R29.898 WEAKNESS OF RIGHT LOWER EXTREMITY: ICD-10-CM

## 2025-04-28 PROCEDURE — 97530 THERAPEUTIC ACTIVITIES: CPT | Mod: PN

## 2025-04-28 NOTE — PROGRESS NOTES
"    Outpatient Rehab    Physical Therapy Visit    Patient Name: Octavio Constantino  MRN: [id number]  YOB: 1950  Encounter Date: 2025    Therapy Diagnosis:   Encounter Diagnoses   Name Primary?    Decreased ROM of lumbar spine Yes    Weakness of right lower extremity        Physician: Cass Contreras, *    Physician Orders: Eval and Treat  Medical Diagnosis: S/P lumbar spinal fusion    Visit # / Visits Authorized:    Insurance Authorization Period: 3/13/2025 to 2025  Date of Evaluation: 3/6/2025   Plan of Care Certification:  3/6/2025 to 2025      PT/PTA:    Number of PTA visits since last PT visit:   Time In: 1102   Time Out: 1159  Total Time: 57  Total Billable Time: 30    FOTO:  Intake Score:  %  Survey Score 1:  %  Survey Score 2:  %         Subjective   No change regarding constant numbness in dorsal aspect of right toes and intermittent tingling into left toes that was not happening before surgery. Right leg continues to get extremely weak and heavy with higher level physical activity. No low back pain,.  Pain reported as 1/10. lumbar spine and right lower extremity    Objective          4/15/2025   Toe walking: (+) unable   Reflex testin - patellar and achilles      Lower Extremity Strength                 LE           Right           Left   Hip flexion: 4-/5 4+/5   Knee flexion 4-/5 4/5   Knee extension 4+/5 4+/5   Ankle dorsiflexion: 4-/5 4+/5   Ankle plantarflexion: 4-/5 5/5         Treatment:  Balance/Neuromuscular Re-Education  NMR 6: Standing hip extension: 3x10 at step  NMR 7: Standing SAPD - level 2: green band 3x15 B  NMR 8: Standing low multifidi SAPD (palm up): green band 3x10 with 5" hold  NMR 9: Standing diagonal chops: green band 2x10 with bilateral upper extremities  NMR 10: D1 diagonal lifts: green band 2x10 with bilateral upper extremities  Therapeutic Activity  TA 1: Cybex knee extensions: 10# 3x10 sL  TA 2: Cybex hamstring curls: 4 plates 3x10 " "sL  TA 3: Recumbent bike: level 4 for 6 minutes  TA 4: Split squats: tapping 6" step 2x10 B with UE support  TA 5: Knee extensions stretch at step: 10x10"  TA 7: Cable column resistance walkin# - 5 laps forwards/backwards  TA 8: Side plank on elbow at elevated mat (1st ring): 2x20" B  TA 9: Sit to stands with overhead press: blue ball 3x10 from black chair  TA 10: Standing plank on elevated mat (straight arm): 3x30"    Assessment & Plan   Assessment: Patient presents 3 months s/p lumbar spinal fusion. Continued constant right foot numbness with intermittent numbness in left foot. Numbness sensation not worsening nor improving either. Despite this, no longer experiencing any low back discomfort. Continued anti-rotational chops and lifts without increased pain or symptom reproduction. Responding great to higher level posterior chain stabilization and posterolateral hip strengthening. Biggest issue is continued numbness in plantar surface of bilateral feet. Still considering lower extremity numbness/weakness coming from L5-S1 neural compression secondary to myotomal weakness, hyporeflexia, and unable to toe walk. Follow up with MD Wednesday.      Patient will continue to benefit from skilled outpatient physical therapy to address the deficits listed in the problem list box on initial evaluation, provide pt/family education and to maximize pt's level of independence in the home and community environment.     Patient's spiritual, cultural, and educational needs considered and patient agreeable to plan of care and goals.           Plan: MD follow up Wednesday. Pontential discharge next.    Goals:   Active       Ambulation/movement       Patient will be able to complete 6 MWT without rest breaks to demonstrate improve tolerance to long duration walking. (Progressing)       Start:  25    Expected End:  25            Patient will no longer use TLSO brace to return to prior level of function. (Progressing)  "      Start:  03/07/25    Expected End:  04/04/25               Changing body position       Patient will be able to perform 10 repetitions of sit to stands without upper extremity support to improve independence. (Progressing)       Start:  03/07/25    Expected End:  05/02/25               Functional outcome       Patient will demonstrate independence in home program for support of progression (Progressing)       Start:  03/07/25    Expected End:  04/04/25               Home activities       Patient will perform household indoor maintenance without increased pain levels to improve quality of life and independence. (Progressing)       Start:  03/07/25    Expected End:  05/02/25               Pain       Patient will report pain of 2/10 demonstrating a reduction of overall pain (Progressing)       Start:  03/07/25    Expected End:  04/04/25               Strength       Patient will achieve right hip flexion strength of 4+/5 (Progressing)       Start:  03/07/25    Expected End:  05/02/25            Patient will achieve right knee extension strength of 5/5 (Progressing)       Start:  03/07/25    Expected End:  05/02/25                Alea Gary, PT

## 2025-04-30 ENCOUNTER — TELEPHONE (OUTPATIENT)
Dept: NEUROSURGERY | Facility: CLINIC | Age: 75
End: 2025-04-30
Payer: MEDICARE

## 2025-04-30 ENCOUNTER — HOSPITAL ENCOUNTER (OUTPATIENT)
Facility: HOSPITAL | Age: 75
Discharge: HOME | End: 2025-04-30
Attending: NEUROLOGICAL SURGERY
Payer: MEDICARE

## 2025-04-30 ENCOUNTER — OFFICE VISIT (OUTPATIENT)
Dept: NEUROSURGERY | Facility: CLINIC | Age: 75
End: 2025-04-30
Payer: MEDICARE

## 2025-04-30 VITALS
DIASTOLIC BLOOD PRESSURE: 91 MMHG | HEIGHT: 69 IN | BODY MASS INDEX: 29.03 KG/M2 | WEIGHT: 196 LBS | SYSTOLIC BLOOD PRESSURE: 156 MMHG | HEART RATE: 65 BPM

## 2025-04-30 DIAGNOSIS — M47.812 CERVICAL SPONDYLOSIS: ICD-10-CM

## 2025-04-30 DIAGNOSIS — Z98.1 S/P LUMBAR FUSION: ICD-10-CM

## 2025-04-30 DIAGNOSIS — Z98.1 S/P LUMBAR SPINAL FUSION: Primary | ICD-10-CM

## 2025-04-30 DIAGNOSIS — Z98.1 STATUS POST LUMBAR SPINAL FUSION: Primary | ICD-10-CM

## 2025-04-30 PROCEDURE — 3288F FALL RISK ASSESSMENT DOCD: CPT | Mod: CPTII,S$GLB,, | Performed by: NEUROLOGICAL SURGERY

## 2025-04-30 PROCEDURE — 1159F MED LIST DOCD IN RCRD: CPT | Mod: CPTII,S$GLB,, | Performed by: NEUROLOGICAL SURGERY

## 2025-04-30 PROCEDURE — 1101F PT FALLS ASSESS-DOCD LE1/YR: CPT | Mod: CPTII,S$GLB,, | Performed by: NEUROLOGICAL SURGERY

## 2025-04-30 PROCEDURE — 3077F SYST BP >= 140 MM HG: CPT | Mod: CPTII,S$GLB,, | Performed by: NEUROLOGICAL SURGERY

## 2025-04-30 PROCEDURE — 1126F AMNT PAIN NOTED NONE PRSNT: CPT | Mod: CPTII,S$GLB,, | Performed by: NEUROLOGICAL SURGERY

## 2025-04-30 PROCEDURE — 3080F DIAST BP >= 90 MM HG: CPT | Mod: CPTII,S$GLB,, | Performed by: NEUROLOGICAL SURGERY

## 2025-04-30 PROCEDURE — 72082 X-RAY EXAM ENTIRE SPI 2/3 VW: CPT | Mod: 26,,, | Performed by: RADIOLOGY

## 2025-04-30 PROCEDURE — 99214 OFFICE O/P EST MOD 30 MIN: CPT | Mod: S$GLB,,, | Performed by: NEUROLOGICAL SURGERY

## 2025-04-30 PROCEDURE — 99999 PR PBB SHADOW E&M-EST. PATIENT-LVL III: CPT | Mod: PBBFAC,,, | Performed by: NEUROLOGICAL SURGERY

## 2025-04-30 PROCEDURE — 72082 X-RAY EXAM ENTIRE SPI 2/3 VW: CPT | Mod: TC,PN

## 2025-04-30 PROCEDURE — 1157F ADVNC CARE PLAN IN RCRD: CPT | Mod: CPTII,S$GLB,, | Performed by: NEUROLOGICAL SURGERY

## 2025-04-30 NOTE — PROGRESS NOTES
Oswestry Low Back Pain Disability Questionnaire    DATE OF SERVICE:  04/30/2025    ATTENDING PHYSICIAN:  Clemente Lindquist MD    Instructions    This questionnaire has been designed to give us information as to how your back or leg pain is affecting your ability to manage in everyday life. Please answer by checking ONE box in each section for the statement which best applies to you. We realize you may consider that two or more statements in any one section apply but please just shade out the spot that indicates the statement which most clearly describes your problem.    Section 1 - Pain intensity    * I have no pain at the moment.  * The pain is very mild at the moment.  * The pain is moderate at the moment.  * The pain is fairly severe at the moment.  * The pain is very severe at the moment.  * The pain is the worst imaginable at the moment.    Score : 0    Section 2 - Personal care (washing, dressing etc)    * I can look after myself normally without causing extra pain.  * I can look after myself normally but it causes extra pain.  * It is painful to look after myself and I am slow and careful.  * I need some help but manage most of my personal care.  * I need help every day in most aspects of self-care.  * I do not get dressed, I wash with difficulty and stay in bed.    Score : 0    Section 3 - Lifting    * I can lift heavy weights without extra pain.  * I can lift heavy weights but it gives extra pain.  * Pain prevents me from lifting heavy weights off the floor, but I can manage if they are conveniently placed eg. on a table.  * Pain prevents me from lifting heavy weights, but I can manage light to medium weights if they are conveniently positioned.  * I can lift very light weights.  * I cannot lift or carry anything at all.    Score : 0    Section 4 - Walking    * Pain does not prevent me walking any distance.  * Pain prevents me from walking more than 1 mile.         * Pain prevents me from walking more than  1/2 mile.         * Pain prevents me from walking more than 100 yards.            * I can only walk using a stick or crutches.  * I am in bed most of the time.    Score : 2    Section 5 - Sitting    * I can sit in any chair as long as I like.  * I can only sit in my favourite chair as long as I like.  * Pain prevents me sitting more than one hour.  * Pain prevents me from sitting more than 30 minutes.  * Pain prevents me from sitting more than 10 minutes.  * Pain prevents me from sitting at all.    Score : 0    Section 6 - Standing    * I can stand as long as I want without extra pain.  * I can stand as long as I want but it gives me extra pain.  * Pain prevents me from standing for more than 1 hour  * Pain prevents me from standing for more than 30 minutes.  * Pain prevents me from standing for more than 10 minutes.  * Pain prevents me from standing at all.     Score : 1    Section 7 - Sleeping    * My sleep is never disturbed by pain.  * My sleep is occasionally disturbed by pain.  * Because of pain I have less than 6 hours sleep.   * Because of pain I have less than 4 hours sleep.   * Because of pain I have less than 2 hours sleep.  * Pain prevents me from sleeping at all.    Score : 0    Section 8 - Sex life (if applicable)    * My sex life is normal and causes no extra pain.  * My sex life is normal but causes some extra pain.  * My sex life is nearly normal but is very painful.   * My sex life is severely restricted by pain.  * My sex life is nearly absent because of pain.   * Pain prevents any sex life at all.    Score : 0    Section 9 - Social life    * My social life is normal and gives me no extra pain.  * My social life is normal but increases the degree of pain.  * Pain has no significant effect on my social life apart from limiting my more energetic interests eg, sport.  * Pain has restricted my social life and I do not go out as often.  * Pain has restricted my social life to my home.   * I have no  social life because of pain.     Score : 2    Section 10 - Travelling    * I can travel anywhere without pain.  * I can travel anywhere but it gives me extra pain.  * Pain is bad but I manage journeys over two hours.  * Pain restricts me to journeys of less than one hour.  * Pain restricts me to short necessary journeys under 30 minutes.  * Pain prevents me from travelling except to receive treatment.    Score : 0      TOTAL SCORE :  5 / 50 x 2 = 10 %      References  1. [place] TEODORO, Sanchez PB. The Oswestry Disability Index. Spine 2000 Nov 15;25(22):2941-52; discussion 52.  from standing for more than from standing for more than from standing for more than from standing at all

## 2025-05-08 ENCOUNTER — CLINICAL SUPPORT (OUTPATIENT)
Dept: REHABILITATION | Facility: HOSPITAL | Age: 75
End: 2025-05-08
Payer: MEDICARE

## 2025-05-08 DIAGNOSIS — M53.86 DECREASED ROM OF LUMBAR SPINE: Primary | ICD-10-CM

## 2025-05-08 DIAGNOSIS — R29.898 WEAKNESS OF RIGHT LOWER EXTREMITY: ICD-10-CM

## 2025-05-08 PROCEDURE — 97530 THERAPEUTIC ACTIVITIES: CPT | Mod: PN

## 2025-05-13 ENCOUNTER — TELEPHONE (OUTPATIENT)
Dept: NEUROSURGERY | Facility: CLINIC | Age: 75
End: 2025-05-13
Payer: MEDICARE

## 2025-05-13 DIAGNOSIS — M48.02 CERVICAL SPINAL STENOSIS: Primary | ICD-10-CM

## 2025-05-13 NOTE — PROGRESS NOTES
"  Outpatient Rehab    Physical Therapy Progress Note : Updated Plan of Care    Patient Name: Octavio Constantino  MRN: [id number]  YOB: 1950  Encounter Date: 2025    Therapy Diagnosis:   Encounter Diagnoses   Name Primary?    Decreased ROM of lumbar spine Yes    Weakness of right lower extremity      Physician: Cass Contreras, *    Physician Orders: Eval and Treat  Medical Diagnosis: S/P lumbar spinal fusion    Visit # / Visits Authorized:  15 / 32  Insurance Authorization Period: 3/13/2025 to 7/10/2025  Date of Evaluation: 3/6/2025  Plan of Care Certification:  3/6/2025 to 2025 --> 2025 to 2025     PT/PTA: PT   Number of PTA visits since last PT visit:0  Time In: 1400   Time Out: 1455  Total Time (in minutes): 55   Total Billable Time (in minutes): 30    FOTO:  Intake Score:  %  Survey Score 2:  %  Survey Score 3:  %    Precautions:       Subjective   MD follow up said numbness and tingling into toes will take time. Will monitor to see if signs and symptoms get worse. He is also having numbness/tingling into his hands. Dr. Lindquist suggested potential cervical fusion in the future. He is interested in seeing if PT can help his neck pain.  Pain reported as 1/10. lumbar spine and right lower extremity    Objective          2025   Toe walking: (+) unable   Reflex testin - patellar and achilles       Lower Extremity Strength                 LE           Right           Left   Hip flexion: 4-/5 4+/5   Knee flexion 4-/5 4/5   Knee extension 4+/5 4+/5   Ankle dorsiflexion: 4-/5 4+/5   Ankle plantarflexion: 4-/5 5/5       Treatment:  Balance/Neuromuscular Re-Education  NMR 1: Standing no money: RTB 3x10  NMR 2: Standing horizontal shoulder abduction: RTB 2x10  NMR 7: Standing SAPD - level 2: green band 3x15 B  NMR 8: Standing low multifidi SAPD (palm up): green band 3x10 with 5" hold  NMR 9: Wall slides: 20x  Therapeutic Activity  TA 1: objective tests and measures  TA 2: " subjective regarding follow up appointment with MD and next steps    Time Entry(in minutes):       Assessment & Plan   Assessment  Octavio                 Assessment Details: Patient presents > 3 months s/p lumbar spinal fusion. Continued constant right foot numbness with intermittent numbness in left foot. Numbness sensation not worsening nor improving either. Despite this, no longer experiencing any low back discomfort. Absent reflex testing to L4-L5 and S1. Unable to toe walk. Still considering lower extremity numbness/weakness coming from L5-S1 neural compression secondary to myotomal weakness, hyporeflexia, and unable to toe walk. Believe patient has met maximum potential in therapy at this time regarding lumbar fusion. Patient is suffering from bilateral upper extremity tingling secondary to cervical stenosis. Interested in starting physical therapy for cervical spine. Therapist will reach out to referring provider with updated referral to include cervical spine into current plan of care. Once order recieved, will assess C-spine.    Plan  From a physical therapy perspective, the patient would benefit from: Skilled Rehab Services                              Plan details: Will reach out to Cass Contreras for c-spine referral          Patient will continue to benefit from skilled outpatient physical therapy to address the deficits listed in the problem list box on initial evaluation, provide pt/family education and to maximize pt's level of independence in the home and community environment.     Patient's spiritual, cultural, and educational needs considered and patient agreeable to plan of care and goals.           Goals:   Active       Ambulation/movement       Patient will be able to complete 6 MWT without rest breaks to demonstrate improve tolerance to long duration walking. (Adequate for Care Transition)       Start:  03/07/25    Expected End:  04/18/25            Patient will no longer use TLSO brace to  return to prior level of function. (Met)       Start:  03/07/25    Expected End:  04/04/25    Resolved:  05/13/25            Home activities       Patient will perform household indoor maintenance without increased pain levels to improve quality of life and independence. (Met)       Start:  03/07/25    Expected End:  05/02/25    Resolved:  05/13/25            Strength       Patient will achieve right hip flexion strength of 4+/5 (Progressing)       Start:  03/07/25    Expected End:  05/02/25            Patient will achieve right knee extension strength of 5/5 (Met)       Start:  03/07/25    Expected End:  05/02/25    Resolved:  05/13/25           Resolved       Changing body position       Patient will be able to perform 10 repetitions of sit to stands without upper extremity support to improve independence. (Met)       Start:  03/07/25    Expected End:  05/02/25    Resolved:  05/13/25            Functional outcome       Patient will demonstrate independence in home program for support of progression (Met)       Start:  03/07/25    Expected End:  04/04/25    Resolved:  05/13/25            Pain       Patient will report pain of 2/10 demonstrating a reduction of overall pain (Met)       Start:  03/07/25    Expected End:  04/04/25    Resolved:  05/13/25             Alea Gary PT

## 2025-05-13 NOTE — TELEPHONE ENCOUNTER
----- Message from Alea Gary PT sent at 5/12/2025 12:41 PM CDT -----  Regarding: Additional referral for cervical stenosis  Sachin Odell! Could you add a referral for cervical stenosis to the most recent plan of care so we can transition to cervical spine in therapy? Thanks!Alea Gary, DPTPhysical TherapistOchs[facility] Therapy and [facility]-mail: [email or web address] [email or web address]: [phone]

## 2025-05-15 ENCOUNTER — CLINICAL SUPPORT (OUTPATIENT)
Dept: REHABILITATION | Facility: HOSPITAL | Age: 75
End: 2025-05-15
Payer: MEDICARE

## 2025-05-15 DIAGNOSIS — R29.898 WEAKNESS OF RIGHT LOWER EXTREMITY: ICD-10-CM

## 2025-05-15 DIAGNOSIS — R53.1 DECREASED STRENGTH: ICD-10-CM

## 2025-05-15 DIAGNOSIS — M53.82 IMPAIRED RANGE OF MOTION OF CERVICAL SPINE: Primary | ICD-10-CM

## 2025-05-15 DIAGNOSIS — M53.86 DECREASED ROM OF LUMBAR SPINE: ICD-10-CM

## 2025-05-15 DIAGNOSIS — R29.3 POSTURE IMBALANCE: ICD-10-CM

## 2025-05-15 PROCEDURE — 97530 THERAPEUTIC ACTIVITIES: CPT | Mod: PN

## 2025-05-15 PROCEDURE — 97112 NEUROMUSCULAR REEDUCATION: CPT | Mod: PN

## 2025-05-15 NOTE — PROGRESS NOTES
Outpatient Rehab    Physical Therapy Progress Note : Updated Plan of Care    Patient Name: Octavio Constantino  MRN: [id number]  YOB: 1950  Encounter Date: 5/15/2025    Therapy Diagnosis:   Encounter Diagnoses   Name Primary?    Impaired range of motion of cervical spine Yes    Decreased strength     Posture imbalance     Decreased ROM of lumbar spine     Weakness of right lower extremity      Physician: Cass Contreras, *    Physician Orders: Eval and Treat  Medical Diagnosis: S/P lumbar spinal fusion    Visit # / Visits Authorized:  16 / 32  Insurance Authorization Period: 3/13/2025 to 7/10/2025  Date of Evaluation: 3/6/2025  Plan of Care Certification: 3/6/2025 to 4/30/2025 --> 4/30/2025 to 7/4/2025      PT/PTA:   0  Number of PTA visits since last PT visit: 0  Time In:  3:00 pm  Time Out:  3:57 pm  Total Time (in minutes):   57 minutes   Total Billable Time (in minutes):  57 minutes     FOTO: 1/3  Intake Score:  %  Survey Score 2:  %  Survey Score 3:  %    Precautions:       Subjective   Started to having neck pain following lumbar fusion. Prior to surgery, he has bilateral numbness in his hands. Numbness is present at his fingers tips at digit 1-4th that is constant. Denies diabetes. Hx of smoking, but stopped in 1980s. No numbness in upper extremities. Has not noticed any weakness in upper extremities or  strength..  Pain reported as 3/10. cervical spine    Objective   Posture        Shoulders are Rounded.        significant forwards head posture; shearing at C6-C7; dowagers hump    Right Dermatomes  Right Cervical Dermatome Light Touch  Intact: C4, C5, and C6  Diminished: C7 and C8  Right Upper Thoracic Dermatome Light Touch  Intact: T1       Left Dermatomes  Left Cervical Dermatome Light Touch  Intact: C4, C5, and C6  Diminished: C7 and C8  Left Upper Thoracic Dermatome Light Touch  Intact: T1           Spinal Mobility  Hypomobile: Cervical  Cervical Mobility Details:  to C-spine  "- significant anterior shearing at C7    Cervical Range of Motion   Active (deg) Passive (deg) Pain   Flexion 55       Extension 26       Right Lateral Flexion 30       Right Rotation 47 -- 60 (after exercises)       Left Lateral Flexion 23       Left Rotation 45-- 61 (after exercises)            Shoulder Strength - Planes of Motion   Right Strength Right Pain Left Strength Left  Pain   Flexion 4-         Horizontal ABduction 4         Horizontal ADduction           Internal Rotation 0° 5         Internal Rotation 90°           External Rotation 0° 5             Elbow Strength   Right Strength Right Pain Left Strength Left  Pain   Flexion (C6) 5   5     Extension (C7) 4-   4-            Wrist Strength - Planes of Motion   Right Strength Right Pain Left Strength Left  Pain   Flexion 4   4     Extension 4+   4+       Hand Strength - Planes of Motion   Right Strength Right Pain Left Strength Left  Pain   Thumb Palmar ABduction 4-   4-     MCP Flexion/Lumbricals 5   5         Cervical/Thoracic Special Tests    Negative: Right ULTT1 (Median), Left ULTT1 (Median), Right ULTT2b (Radial), Left ULTT2b (Radial), Right ULTT3 (Ulnar), and Left ULTT3 (Ulnar)  *Limited tested secondary to wrist hypomobility affecting ability to maneuver arm into appropriate positioning.       Treatment:  Balance/Neuromuscular Re-Education  NMR 1: Supine chin tucks: 20x5" with towel roll base of skull  NMR 2: Supine cervical retractions: 20x5" into towel roll base of skull  NMR 3: Supine cervical rotation AAROM: 10x5" B  NMR 4: Supine cervical rotation isometrics: 15x5"  NMR 5: Supine shoulder wand flexion: 3# dowel 3x10  Therapeutic Activity  TA 1: Re-evaluation  TA 2: Objective tests and measurs    Time Entry(in minutes):       Assessment & Plan    Patient was previously being seen for > 3 months s/p lumbar spinal fusion. Patient met maximum potential in therapy for low back at this time.  Will transfer plan of care to neck pain. Patient is " "suffering with cervical stenosis with associated upper extremity tingling into finger tips. Patient is not a diabetic or current smoker (history of smoking over 20 years ago). Previous MRI shows "moderate canal stenosis and moderate-severe foraminal stenosis at multiple levels." Negative upper limb tension testing, but therapist unable to appropriately perform tests secondary to multi-joint hypomobility affecting tension on nerve distributions. No reductions in tingling with manual traction. Focused session on deep cervical stabilization isometrics, cervical mobility AAROM, and thoracic mobility. Improvement >10 degrees with cervical rotation after gravity assisted cervical rotation and isometric activation. Provided updated home exercise program.     Patient will continue to benefit from skilled outpatient physical therapy to address the deficits listed in the problem list box on initial evaluation, provide pt/family education and to maximize pt's level of independence in the home and community environment.     Patient's spiritual, cultural, and educational needs considered and patient agreeable to plan of care and goals.           Goals:   Active       Long Term goals        1. Patient will improve cervical rotation mobility to >/= 70 degrees to improve functional mobility and driving.       Start:  05/19/25    Expected End:  06/30/25            2. Patient will improve upper extremity manual muscle testing to >/= 4+/5 to improve upper extremity tolerance       Start:  05/19/25    Expected End:  06/30/25            3. Patient will report no pain with functional activities in his home to improve quality of life.       Start:  05/19/25    Expected End:  06/30/25               Short term goals       1. Patient will be independent with home exercise program to supplement physical therapy treatment in improving functional status.       Start:  05/19/25    Expected End:  06/09/25            2. Patient will be able to " "perform 10" chin tuck with lift to improve deep cervical stabilization.       Start:  05/19/25    Expected End:  06/09/25            3. Patient will improve cervical rotation by >/= 10 degrees to improve functional mobility       Start:  05/19/25    Expected End:  06/09/25              Resolved       Changing body position       Patient will be able to perform 10 repetitions of sit to stands without upper extremity support to improve independence. (Met)       Start:  03/07/25    Expected End:  05/02/25    Resolved:  05/13/25            Functional outcome       Patient will demonstrate independence in home program for support of progression (Met)       Start:  03/07/25    Expected End:  04/04/25    Resolved:  05/13/25            Pain       Patient will report pain of 2/10 demonstrating a reduction of overall pain (Met)       Start:  03/07/25    Expected End:  04/04/25    Resolved:  05/13/25             Alea Gary PT    "

## 2025-05-19 ENCOUNTER — CLINICAL SUPPORT (OUTPATIENT)
Dept: REHABILITATION | Facility: HOSPITAL | Age: 75
End: 2025-05-19
Payer: MEDICARE

## 2025-05-19 DIAGNOSIS — R29.898 WEAKNESS OF RIGHT LOWER EXTREMITY: ICD-10-CM

## 2025-05-19 DIAGNOSIS — M53.86 DECREASED ROM OF LUMBAR SPINE: Primary | ICD-10-CM

## 2025-05-19 DIAGNOSIS — R29.3 POSTURE IMBALANCE: ICD-10-CM

## 2025-05-19 DIAGNOSIS — M53.82 IMPAIRED RANGE OF MOTION OF CERVICAL SPINE: ICD-10-CM

## 2025-05-19 PROBLEM — R53.1 DECREASED STRENGTH: Status: ACTIVE | Noted: 2025-05-19

## 2025-05-19 PROCEDURE — 97112 NEUROMUSCULAR REEDUCATION: CPT | Mod: PN

## 2025-05-19 NOTE — PROGRESS NOTES
"  Outpatient Rehab    Physical Therapy Visit    Patient Name: Octavio Constantino  MRN: [id number]  YOB: 1950  Encounter Date: 5/19/2025    Therapy Diagnosis:   Encounter Diagnoses   Name Primary?    Decreased ROM of lumbar spine Yes    Weakness of right lower extremity     Impaired range of motion of cervical spine     Posture imbalance      Physician: Cass Contreras, *    Physician Orders: Eval and Treat  Medical Diagnosis: Cervical spinal stenosis    Visit # / Visits Authorized:  1 / 1  Insurance Authorization Period: 5/13/2025 to 5/13/2026  Date of Evaluation: 3/6/2025  Plan of Care Certification: 5/13/2025 to 7/4/2025      PT/PTA:   PT  Number of PTA visits since last PT visit: 0  Time In:   2:03 pm  Time Out:  2:58 pm  Total Time (in minutes):  55 minutes    Total Billable Time (in minutes):  30 minutes     FOTO: 1/3  Intake Score:  %  Survey Score 2:  %  Survey Score 3:  %    Precautions:       Subjective    Left hand tingling may be improving, but right hand may be getting worse. Has been good about doing exercises.         Objective            Treatment:     Therapeutic Exercise  Cervical rotation SNAG: 15x5"  Thoracic extensions: 1/2 towel roll on teal chair - 15x5" (light intensity, minimal range due to lumbar fusion)  Corner stretch: 3x30"   Wall slides: 2x10 with chest expansion  Chicken wings: 15x5"    Balance/Neuromuscular Re-Education  Seated no money: RTB 3x10   Seated Cervical retractions: RTB 20x5"   Supine chin tucks: 20x5"  Supine chin tucks + lift: 15x3"  Supine cervical rotation isometric: 10x5"  Supine theraband T: green therband 3x10  Cervical walkouts out - NEXT      Time Entry(in minutes):  Neuromuscular Re-Education Time Entry: 30  Therapeutic Exercise Time Entry: 23    Assessment & Plan   Assessment:   Patient was previously being seen for > 3 months s/p lumbar spinal fusion. Patient met maximum potential in therapy for low back at this time. Will transfer plan of care " "to neck pain. Patient is suffering with cervical stenosis with associated upper extremity tingling into finger tips. First follow up focused on chest expansion, thoracic mobility within safe ranges, cervical AAROM/AROM, and deep cervical stabilization. Improvement in bilateral cervical rotation range of motion measurements at end of session compared to initial evaluation.       Patient will continue to benefit from skilled outpatient physical therapy to address the deficits listed in the problem list box on initial evaluation, provide pt/family education and to maximize pt's level of independence in the home and community environment.     Patient's spiritual, cultural, and educational needs considered and patient agreeable to plan of care and goals.           Plan:  chest expansion, cervical range of motion, deep cervical stabilization, periscapular strengthening to promote upright posture. Safe ranges with     Goals:   Active       Long Term goals        1. Patient will improve cervical rotation mobility to >/= 70 degrees to improve functional mobility and driving.       Start:  05/19/25    Expected End:  06/30/25            2. Patient will improve upper extremity manual muscle testing to >/= 4+/5 to improve upper extremity tolerance       Start:  05/19/25    Expected End:  06/30/25            3. Patient will report no pain with functional activities in his home to improve quality of life.       Start:  05/19/25    Expected End:  06/30/25               Short term goals       1. Patient will be independent with home exercise program to supplement physical therapy treatment in improving functional status.       Start:  05/19/25    Expected End:  06/09/25            2. Patient will be able to perform 10" chin tuck with lift to improve deep cervical stabilization.       Start:  05/19/25    Expected End:  06/09/25            3. Patient will improve cervical rotation by >/= 10 degrees to improve functional mobility       " Start:  05/19/25    Expected End:  06/09/25              Resolved       Changing body position       Patient will be able to perform 10 repetitions of sit to stands without upper extremity support to improve independence. (Met)       Start:  03/07/25    Expected End:  05/02/25    Resolved:  05/13/25            Functional outcome       Patient will demonstrate independence in home program for support of progression (Met)       Start:  03/07/25    Expected End:  04/04/25    Resolved:  05/13/25            Pain       Patient will report pain of 2/10 demonstrating a reduction of overall pain (Met)       Start:  03/07/25    Expected End:  04/04/25    Resolved:  05/13/25             Alea Gary, PT

## 2025-05-22 ENCOUNTER — CLINICAL SUPPORT (OUTPATIENT)
Dept: REHABILITATION | Facility: HOSPITAL | Age: 75
End: 2025-05-22
Payer: MEDICARE

## 2025-05-22 DIAGNOSIS — M53.86 DECREASED ROM OF LUMBAR SPINE: Primary | ICD-10-CM

## 2025-05-22 DIAGNOSIS — R29.3 POSTURE IMBALANCE: ICD-10-CM

## 2025-05-22 DIAGNOSIS — R29.898 WEAKNESS OF RIGHT LOWER EXTREMITY: ICD-10-CM

## 2025-05-22 DIAGNOSIS — M53.82 IMPAIRED RANGE OF MOTION OF CERVICAL SPINE: ICD-10-CM

## 2025-05-22 PROCEDURE — 97112 NEUROMUSCULAR REEDUCATION: CPT | Mod: PN

## 2025-05-22 NOTE — PROGRESS NOTES
"  Outpatient Rehab    Physical Therapy Visit    Patient Name: Octavio Constantino  MRN: [id number]  YOB: 1950  Encounter Date: 5/22/2025    Therapy Diagnosis:   Encounter Diagnoses   Name Primary?    Decreased ROM of lumbar spine Yes    Weakness of right lower extremity     Impaired range of motion of cervical spine     Posture imbalance        Physician: Cass Contreras, *    Physician Orders: Eval and Treat  Medical Diagnosis: S/P lumbar spinal fusion    Visit # / Visits Authorized:  17 / 32  Insurance Authorization Period: 3/13/2025 to 7/10/2025  Date of Evaluation: 3/6/2025  Plan of Care Certification: 5/13/2025 to 7/4/2025      PT/PTA: PT   Number of PTA visits since last PT visit:0  Time In: 1000   Time Out: 1055  Total Time (in minutes): 55  Total Billable Time (in minutes): 30    FOTO: 1/3  Intake Score:  %  Survey Score 2:  %  Survey Score 3:  %    Precautions:       Subjective   Less achy and stiff in the neck. Continued tingling into finger tips. May be getting better but not sure.      Objective            Treatment:     Therapeutic Exercise  Cervical rotation SNAG: 15x5"  Cervical extension SNAG: 15x5"   Thoracic extensions: 1/2 towel roll on teal chair - 15x5" (light intensity, minimal range due to lumbar fusion)  Corner stretch: 3x30"   Wall slides: 2x10 with chest expansion  Chicken wings: 15x5"      Balance/Neuromuscular Re-Education  Seated Cervical retractions: RTB 20x5"   Supine   Towel roll under spine with shoulder wand flexion: 2# dowel 3x10   Towel roll under spine with shoulder wand press up: 5# dowel 3x10  Chin tucks: 20x5"  Chin tucks + lift: 20x3"  Cervical rotation isometric: 10x5" B  Theraband T: green therband 3x10  Seated no money: RTB 3x10   Cervical walkouts out: red cable - 10 laps (forwards, retro, lateral)      Time Entry(in minutes):  Neuromuscular Re-Education Time Entry: 35  Therapeutic Exercise Time Entry: 20    Assessment & Plan   Assessment:   Patient was " "previously being seen for > 3 months s/p lumbar spinal fusion. Patient met maximum potential in therapy for low back at this time. Will transfer plan of care to neck pain. Patient is suffering with cervical stenosis with associated upper extremity tingling into finger tips. Reports improvement in neck pain and range of motion. Continued thoracic mobility, deep cervical stabilization, and periscapular stabilization. Quick fatigue with deep cervical stabilizer strengthening.       Patient will continue to benefit from skilled outpatient physical therapy to address the deficits listed in the problem list box on initial evaluation, provide pt/family education and to maximize pt's level of independence in the home and community environment.     Patient's spiritual, cultural, and educational needs considered and patient agreeable to plan of care and goals.           Plan:  chest expansion, cervical range of motion, deep cervical stabilization, periscapular strengthening to promote upright posture. Safe ranges with     Goals:   Active       Long Term goals        1. Patient will improve cervical rotation mobility to >/= 70 degrees to improve functional mobility and driving.       Start:  05/19/25    Expected End:  06/30/25            2. Patient will improve upper extremity manual muscle testing to >/= 4+/5 to improve upper extremity tolerance       Start:  05/19/25    Expected End:  06/30/25            3. Patient will report no pain with functional activities in his home to improve quality of life.       Start:  05/19/25    Expected End:  06/30/25               Short term goals       1. Patient will be independent with home exercise program to supplement physical therapy treatment in improving functional status.       Start:  05/19/25    Expected End:  06/09/25            2. Patient will be able to perform 10" chin tuck with lift to improve deep cervical stabilization.       Start:  05/19/25    Expected End:  06/09/25    "         3. Patient will improve cervical rotation by >/= 10 degrees to improve functional mobility       Start:  05/19/25    Expected End:  06/09/25              Resolved       Changing body position       Patient will be able to perform 10 repetitions of sit to stands without upper extremity support to improve independence. (Met)       Start:  03/07/25    Expected End:  05/02/25    Resolved:  05/13/25            Functional outcome       Patient will demonstrate independence in home program for support of progression (Met)       Start:  03/07/25    Expected End:  04/04/25    Resolved:  05/13/25            Pain       Patient will report pain of 2/10 demonstrating a reduction of overall pain (Met)       Start:  03/07/25    Expected End:  04/04/25    Resolved:  05/13/25               Alea Gary, PT

## 2025-05-27 ENCOUNTER — CLINICAL SUPPORT (OUTPATIENT)
Dept: REHABILITATION | Facility: HOSPITAL | Age: 75
End: 2025-05-27
Payer: MEDICARE

## 2025-05-27 DIAGNOSIS — M53.82 IMPAIRED RANGE OF MOTION OF CERVICAL SPINE: ICD-10-CM

## 2025-05-27 DIAGNOSIS — R29.898 WEAKNESS OF RIGHT LOWER EXTREMITY: ICD-10-CM

## 2025-05-27 DIAGNOSIS — R29.3 POSTURE IMBALANCE: ICD-10-CM

## 2025-05-27 DIAGNOSIS — M53.86 DECREASED ROM OF LUMBAR SPINE: Primary | ICD-10-CM

## 2025-05-27 PROCEDURE — 97112 NEUROMUSCULAR REEDUCATION: CPT | Mod: PN,CQ

## 2025-05-27 PROCEDURE — 97110 THERAPEUTIC EXERCISES: CPT | Mod: PN,CQ

## 2025-05-27 NOTE — PROGRESS NOTES
"  Outpatient Rehab    Physical Therapy Visit    Patient Name: Octavio Constantino  MRN: [id number]  YOB: 1950  Encounter Date: 5/27/2025    Therapy Diagnosis:   Encounter Diagnoses   Name Primary?    Decreased ROM of lumbar spine Yes    Weakness of right lower extremity     Impaired range of motion of cervical spine     Posture imbalance        Physician: Cass Contreras, *    Physician Orders: Eval and Treat  Medical Diagnosis: S/P lumbar spinal fusion    Visit # / Visits Authorized:  18 / 32  Insurance Authorization Period: 3/13/2025 to 7/10/2025  Date of Evaluation: 3/6/2025  Plan of Care Certification: 5/13/2025 to 7/4/2025      PT/PTA:     Number of PTA visits since last PT visit:   Time In: 1200   Time Out: 1255  Total Time (in minutes): 55  Total Billable Time (in minutes): 30    FOTO: 1/3  Intake Score:  %  Survey Score 2:  %  Survey Score 3:  %    Precautions:       Subjective   Less achy and stiff in the neck. Continued tingling into finger tips. May be getting better but not sure.      Objective            Treatment:     Therapeutic Exercise  Cervical rotation SNAG: 15x5"  Cervical extension SNAG: 15x5"   Thoracic extensions: 1/2 towel roll on teal chair - 15x5" (light intensity, minimal range due to lumbar fusion)  Corner stretch: 3x30"   Wall slides: 2x10 with chest expansion  Chicken wings: 15x5"      Balance/Neuromuscular Re-Education  Seated Cervical retractions: RTB 20x5"   Supine:   Towel roll under spine with shoulder wand flexion: 3# dowel 3x10   Towel roll under spine with shoulder wand press up: 5# dowel 3x10  Chin tucks: 20x5" (performed against wall)  Chin tucks + lift: 20x3"  Cervical rotation isometric: 10x5" B  Theraband T: green therband 3x10  Seated no money: RTB 3x10   Cervical walkouts out: red cable - 10 laps (forwards, retro, lateral)      Time Entry(in minutes):  Neuromuscular Re-Education Time Entry: 15  Therapeutic Exercise Time Entry: 15    Assessment & Plan " "  Assessment:   Patient was previously being seen for > 3 months s/p lumbar spinal fusion. Patient met maximum potential in therapy for low back at this time. Will transfer plan of care to neck pain. Patient is suffering with cervical stenosis with associated upper extremity tingling into finger tips. Reports improvement in neck pain and range of motion. Continued thoracic mobility, deep cervical stabilization, and periscapular stabilization. Quick fatigue with deep cervical stabilizer strengthening.       Patient will continue to benefit from skilled outpatient physical therapy to address the deficits listed in the problem list box on initial evaluation, provide pt/family education and to maximize pt's level of independence in the home and community environment.     Patient's spiritual, cultural, and educational needs considered and patient agreeable to plan of care and goals.           Plan:  chest expansion, cervical range of motion, deep cervical stabilization, periscapular strengthening to promote upright posture. Safe ranges with     Goals:   Active       Long Term goals        1. Patient will improve cervical rotation mobility to >/= 70 degrees to improve functional mobility and driving.       Start:  05/19/25    Expected End:  06/30/25            2. Patient will improve upper extremity manual muscle testing to >/= 4+/5 to improve upper extremity tolerance       Start:  05/19/25    Expected End:  06/30/25            3. Patient will report no pain with functional activities in his home to improve quality of life.       Start:  05/19/25    Expected End:  06/30/25               Short term goals       1. Patient will be independent with home exercise program to supplement physical therapy treatment in improving functional status.       Start:  05/19/25    Expected End:  06/09/25            2. Patient will be able to perform 10" chin tuck with lift to improve deep cervical stabilization.       Start:  05/19/25    " Expected End:  06/09/25            3. Patient will improve cervical rotation by >/= 10 degrees to improve functional mobility       Start:  05/19/25    Expected End:  06/09/25              Resolved       Changing body position       Patient will be able to perform 10 repetitions of sit to stands without upper extremity support to improve independence. (Met)       Start:  03/07/25    Expected End:  05/02/25    Resolved:  05/13/25            Functional outcome       Patient will demonstrate independence in home program for support of progression (Met)       Start:  03/07/25    Expected End:  04/04/25    Resolved:  05/13/25            Pain       Patient will report pain of 2/10 demonstrating a reduction of overall pain (Met)       Start:  03/07/25    Expected End:  04/04/25    Resolved:  05/13/25               Zachary Johnson PTA

## 2025-05-29 ENCOUNTER — CLINICAL SUPPORT (OUTPATIENT)
Dept: REHABILITATION | Facility: HOSPITAL | Age: 75
End: 2025-05-29
Payer: MEDICARE

## 2025-05-29 DIAGNOSIS — R29.898 WEAKNESS OF RIGHT LOWER EXTREMITY: ICD-10-CM

## 2025-05-29 DIAGNOSIS — M53.86 DECREASED ROM OF LUMBAR SPINE: Primary | ICD-10-CM

## 2025-05-29 DIAGNOSIS — M53.82 IMPAIRED RANGE OF MOTION OF CERVICAL SPINE: ICD-10-CM

## 2025-05-29 DIAGNOSIS — R29.3 POSTURE IMBALANCE: ICD-10-CM

## 2025-05-29 PROCEDURE — 97112 NEUROMUSCULAR REEDUCATION: CPT | Mod: PN

## 2025-05-29 NOTE — PROGRESS NOTES
"  Outpatient Rehab    Physical Therapy Visit    Patient Name: Octavio Constantino  MRN: [id number]  YOB: 1950  Encounter Date: 5/29/2025    Therapy Diagnosis:   Encounter Diagnoses   Name Primary?    Decreased ROM of lumbar spine Yes    Weakness of right lower extremity     Impaired range of motion of cervical spine     Posture imbalance        Physician: Cass Contreras, *    Physician Orders: Eval and Treat  Medical Diagnosis: S/P lumbar spinal fusion    Visit # / Visits Authorized:  19 / 32  Insurance Authorization Period: 3/13/2025 to 7/10/2025  Date of Evaluation: 3/6/2025  Plan of Care Certification: 5/13/2025 to 7/4/2025      PT/PTA:   PT  Number of PTA visits since last PT visit: 1  Time In:   1:05 pm  Time Out:  2:00 pm   Total Time (in minutes):  55 minutes   Total Billable Time (in minutes):  30 minutes    FOTO: 1/3  Intake Score:  %  Survey Score 2:  %  Survey Score 3:  %    Precautions: prior lumbar fusion       Subjective   Not much change with finger tip numbness. Maybe improved range of motion     Objective    5/29/2025  Cervical Range of Motion    Active (deg) Passive (deg) Pain   Flexion 55--56       Extension 26--38       Right Lateral Flexion 30       Right Rotation 50       Left Lateral Flexion 23--32       Left Rotation 45--60               Treatment:     Balance/Neuromuscular Re-Education  UBE: level 3 - 3'/3'  Standing cervical rotations with beach ball: 15x5"  Cervical walkouts out: red cable - 20 x (forwards, retro, lateral)  Chicken wings: 15x5" against wall  Seated median nerve glides: 2x15   Seated cervical retraction: RTB 30x5"    Supine:  Towel roll under spine with shoulder wand flexion: 3# dowel 3x10   Towel roll under spine with shoulder wand press up: 5# dumbbell 3x10  Chin tucks: 20x5" (performed against wall)  Chin tucks + lift: 20x3"        Time Entry(in minutes):  Neuromuscular Re-Education Time Entry: 55    Assessment & Plan   Assessment:   Patient was " "previously being seen for > 3 months s/p lumbar spinal fusion. Patient is suffering with cervical stenosis with associated upper extremity tingling into finger tips. Continued focus on multi-directional deep cervical stabilization with noted fatigue. Range with upper thoracic spine improving. Improvements noted in cervical range of motion planes upon re-assessment. Numbness in finger tips remains, but may be contributed to alcohol consumption.        Patient will continue to benefit from skilled outpatient physical therapy to address the deficits listed in the problem list box on initial evaluation, provide pt/family education and to maximize pt's level of independence in the home and community environment.     Patient's spiritual, cultural, and educational needs considered and patient agreeable to plan of care and goals.           Plan:  chest expansion, cervical range of motion, deep cervical stabilization, periscapular strengthening to promote upright posture.      Goals:   Active       Long Term goals        1. Patient will improve cervical rotation mobility to >/= 70 degrees to improve functional mobility and driving.       Start:  05/19/25    Expected End:  06/30/25            2. Patient will improve upper extremity manual muscle testing to >/= 4+/5 to improve upper extremity tolerance       Start:  05/19/25    Expected End:  06/30/25            3. Patient will report no pain with functional activities in his home to improve quality of life.       Start:  05/19/25    Expected End:  06/30/25               Short term goals       1. Patient will be independent with home exercise program to supplement physical therapy treatment in improving functional status.       Start:  05/19/25    Expected End:  06/09/25            2. Patient will be able to perform 10" chin tuck with lift to improve deep cervical stabilization.       Start:  05/19/25    Expected End:  06/09/25            3. Patient will improve cervical " rotation by >/= 10 degrees to improve functional mobility       Start:  05/19/25    Expected End:  06/09/25              Resolved       Changing body position       Patient will be able to perform 10 repetitions of sit to stands without upper extremity support to improve independence. (Met)       Start:  03/07/25    Expected End:  05/02/25    Resolved:  05/13/25            Functional outcome       Patient will demonstrate independence in home program for support of progression (Met)       Start:  03/07/25    Expected End:  04/04/25    Resolved:  05/13/25            Pain       Patient will report pain of 2/10 demonstrating a reduction of overall pain (Met)       Start:  03/07/25    Expected End:  04/04/25    Resolved:  05/13/25               Alea Gary PT

## 2025-06-03 ENCOUNTER — CLINICAL SUPPORT (OUTPATIENT)
Dept: REHABILITATION | Facility: HOSPITAL | Age: 75
End: 2025-06-03
Payer: MEDICARE

## 2025-06-03 DIAGNOSIS — R29.3 POSTURE IMBALANCE: ICD-10-CM

## 2025-06-03 DIAGNOSIS — M53.82 IMPAIRED RANGE OF MOTION OF CERVICAL SPINE: ICD-10-CM

## 2025-06-03 DIAGNOSIS — R29.898 WEAKNESS OF RIGHT LOWER EXTREMITY: ICD-10-CM

## 2025-06-03 DIAGNOSIS — M53.86 DECREASED ROM OF LUMBAR SPINE: Primary | ICD-10-CM

## 2025-06-03 PROCEDURE — 97530 THERAPEUTIC ACTIVITIES: CPT | Mod: PN

## 2025-06-06 ENCOUNTER — PATIENT MESSAGE (OUTPATIENT)
Dept: REHABILITATION | Facility: HOSPITAL | Age: 75
End: 2025-06-06
Payer: MEDICARE

## 2025-06-25 ENCOUNTER — TELEPHONE (OUTPATIENT)
Dept: NEUROSURGERY | Facility: CLINIC | Age: 75
End: 2025-06-25
Payer: MEDICARE

## 2025-06-25 NOTE — TELEPHONE ENCOUNTER
Stated to the pt that Cass will not be in clinic on 7/31. I can reschedule him to 8/8 xray's at 345 appointment at 4 pm. Pt voiced understanding.

## 2025-06-28 ENCOUNTER — PATIENT MESSAGE (OUTPATIENT)
Dept: ADMINISTRATIVE | Facility: OTHER | Age: 75
End: 2025-06-28
Payer: MEDICARE

## 2025-07-03 ENCOUNTER — PATIENT MESSAGE (OUTPATIENT)
Dept: ADMINISTRATIVE | Facility: OTHER | Age: 75
End: 2025-07-03
Payer: MEDICARE

## 2025-08-08 ENCOUNTER — OFFICE VISIT (OUTPATIENT)
Dept: NEUROSURGERY | Facility: CLINIC | Age: 75
End: 2025-08-08
Payer: MEDICARE

## 2025-08-08 ENCOUNTER — HOSPITAL ENCOUNTER (OUTPATIENT)
Dept: RADIOLOGY | Facility: HOSPITAL | Age: 75
Discharge: HOME | End: 2025-08-08
Attending: NEUROLOGICAL SURGERY
Payer: MEDICARE

## 2025-08-08 VITALS
SYSTOLIC BLOOD PRESSURE: 124 MMHG | HEIGHT: 69 IN | WEIGHT: 196 LBS | HEART RATE: 72 BPM | BODY MASS INDEX: 29.03 KG/M2 | DIASTOLIC BLOOD PRESSURE: 73 MMHG

## 2025-08-08 DIAGNOSIS — Z98.1 S/P LUMBAR SPINAL FUSION: ICD-10-CM

## 2025-08-08 DIAGNOSIS — Z98.1 S/P LUMBAR SPINAL FUSION: Primary | ICD-10-CM

## 2025-08-08 PROCEDURE — 72100 X-RAY EXAM L-S SPINE 2/3 VWS: CPT | Mod: TC

## 2025-08-08 PROCEDURE — 72100 X-RAY EXAM L-S SPINE 2/3 VWS: CPT | Mod: 26,,, | Performed by: RADIOLOGY

## 2025-08-08 PROCEDURE — 99999 PR PBB SHADOW E&M-EST. PATIENT-LVL III: CPT | Mod: PBBFAC,,, | Performed by: PHYSICIAN ASSISTANT

## 2025-08-08 RX ORDER — GABAPENTIN 300 MG/1
CAPSULE ORAL
Qty: 90 CAPSULE | Refills: 1 | Status: SHIPPED | OUTPATIENT
Start: 2025-08-08

## 2025-08-08 NOTE — PROGRESS NOTES
"  Subjective:     Patient ID:  Octavio Constantino is a 75 y.o. male.    Lexa    Chief Complaint: 6 month po fu    Lspine fusion    HPI    Octavio Constantino is a 75 y.o. male who presents for follow up.    History of Present Illness    Mr. Constantino presents today for follow-up after surgery with ongoing difficulty walking. He reports persistent walking difficulties since surgery. He experiences difficulty walking long distances and drags his feet, particularly with his right leg. He must be consciously aware to lift his foot and take a normal stride to prevent dragging. He notes that physical therapy improved his strength but did not resolve numbness in the bl le. He has balance concerns and must be careful about making abrupt movements to prevent falling. His walking and balance issues are not progressively worsening, but also not improving.     He reports persistent numbness in bilateral extremities extending from the ball of both feet to the toes, with decreased sensation noted particularly in the right calf. The bottom of his feet feel like "cardboard" when curling toes. He has persistent finger numbness affecting all fingers that was present for one year prior to surgery. He experiences difficulty manipulating buttons and zippers due to decreased sensation, noting he can complete tasks visually but lacks previous tactile feeling.     His left foot is more significantly swollen compared to the right, with swelling remaining constant and not progressively worsening. Swelling causes tightness in shoes but does not significantly impact walking. He describes arthritic joint pain in his joints, particularly when walking, which he attributes to aging.     He denies back pain, neck pain, or radiating leg pains. He takes Celebrex intermittently, using it occasionally around midday or on days of increased physical exertion. He also takes blood pressure medication.         Patient denies any recent accidents or trauma, " "no saddle anesthesias, and no bowel or bladder incontinence.      Review of Systems:  Constitution: Negative for chills, fever, night sweats and weight loss.   Musculoskeletal: Negative for falls.   Gastrointestinal: Negative for bowel incontinence, nausea and vomiting.   Genitourinary: Negative for bladder incontinence.   Neurological: Negative for disturbances in coordination and loss of balance.      Objective:      Vitals:    08/08/25 1544   BP: 124/73   Pulse: 72   Weight: 88.9 kg (195 lb 15.8 oz)   Height: 5' 9" (1.753 m)   PainSc: 0-No pain         Physical Exam:      General:  Octavio Constantino is well-developed, well-nourished, appears stated age, in no acute distress, alert and oriented to person, place, and time.    Pulmonary/Chest:  Respiratory effort normal  Abdominal: Exhibits no distension  Psychiatric:  Normal mood and affect.  Behavior is normal.  Judgement and thought content normal      Musculoskeletal:    Mild soreness in neck ROM      Neurological:  Alert and oriented to person, place, and time    Muscle strength against resistance:  5/5 in BL UE except bl HG 4/5     Right Left   Hip flexion  5 / 5 5 / 5   Knee extension  5 / 5 5 / 5   Knee flexion 5 / 5 5 / 5   Dorsiflexion  5 / 5 5 / 5   EHL  5 / 5 5 / 5   Plantar flexion  5 / 5 5 / 5       Reflexes:  2+ reflexes in BL UE and LE  Clonus:  Negative bilaterally    On gross examination of the bilateral upper extremities, patient has full painfree ROM with no signs of clubbing, cyanosis, edema, or weakness.       XRAY Interpretation:     Lumbar spine xrays were personally reviewed today.  No fractures.  Hardware intact.      Narrative & Impression  EXAMINATION:  MRI CERVICAL SPINE WITHOUT CONTRAST     CLINICAL HISTORY:  Spinal stenosis, cervical;.  Spinal stenosis, cervical region     TECHNIQUE:  Multiplanar, multisequence MR images of the cervical spine were acquired without the administration of contrast.     COMPARISON:  RADIOGRAPHS " 05/03/2024     FINDINGS:  ALIGNMENT: Straightening of the normal cervical lordosis.  Minimal retrolisthesis at C3-4.  Grade 1 anterolisthesis C7-T1.     BONE: No compression fractures.  No marrow replacing lesions.     JOINT: Multilevel degenerative changes with disc desiccation, disc height loss, and facet arthropathy.  Degenerative changes also involve the atlantodental joint with prominent subchondral cysts.  No bone marrow edema.     SPINAL CANAL: The cervical spinal cord is unremarkable.  No mass or collection.     POSTERIOR FOSSA: Unremarkable.     PARASPINAL SOFT TISSUES: Unremarkable.     SIGNIFICANT FINDINGS BY LEVEL:     C2-3: Advanced facet arthropathy on the left.  No canal stenosis.  Mild left foraminal stenosis.     C3-4: Disc osteophyte complex.  Mild canal stenosis.  Severe bilateral foraminal stenosis.     C4-5: Disc osteophyte complex.  Superimposed central extrusion with caudal migration to the C5 infrapedicle level.  Moderate canal stenosis with partial effacement of the thecal sac and mild flattening of the right hemicord.  Severe right and moderate left foraminal stenosis.     C5-6: Disc osteophyte complex.  Moderate canal stenosis.  Moderate bilateral foraminal stenosis.     C6-7: Disc osteophyte complex.  Mild canal stenosis.  Mild bilateral foraminal stenosis.     C7-T1:  Disc osteophyte complex and posterior disc uncovering.  Advanced facet arthropathy bilaterally.  Moderate canal stenosis.  Moderate right and mild left foraminal stenosis.     Impression:     Advanced degenerative changes resulting in moderate canal stenosis and moderate-severe foraminal stenosis at multiple levels as detailed above.        Electronically signed by:Artie Clinton  Date:                                            05/29/2024  Time:                                           15:10    Assessment:          1. S/P lumbar spinal fusion            Plan:          Orders Placed This Encounter    X-Ray Scoliosis Complete     gabapentin (NEURONTIN) 300 MG capsule         Assessment & Plan    PLAN SUMMARY:  - Started gabapentin for hand numbness, tingling, and leg neuropathic symptoms  - Discontinue Celebrex and restart Mobic (meloxicam) as needed for arthritis management  - Continue home exercises for neck as previously prescribed by physical therapy  - Deferred repeat cervical MRI due to stability of symptoms  - Contact office through patient portal if symptoms worsen or new symptoms develop  - Follow up with primary care physician regarding leg swelling for potential vascular workup  - Follow up in 6 months for surgical follow-up at 1 year post-op, including XR Back    MEDICAL DECISION MAKING:  - Assessed post-op progress, noting improvement in leg strength but persistent difficulty with walking and numbness in extremities.  - Considered potential cervical spine involvement due to previous MRI findings  - Evaluated need for repeat cervical MRI to assess for worsening of condition, but deferred due to stability of symptoms.  - Explained potential connection between cervical spine issues and leg symptoms, including walking difficulties and balance problems.    MEDICATIONS:  - Started gabapentin for hand numbness, tingling and leg neuropathic symptoms. Take at night initially. Can increase to 3 times daily over 3 weeks if needed. Stay at daily dose if providing relief. Informed about potential side effects, including drowsiness and grogginess.  - Discontinue Celebrex and restart Mobic (meloxicam) as needed for arthritis management, as Celebrex may contribute to leg swelling. Not to be taken daily.    REFERRALS:  - Advised follow up with primary care physician regarding leg swelling for potential vascular workup.    FOLLOW UP:  - Follow up in 6 months for surgical follow-up at 1 year post-op with Dr. Lindquist, including XR Back.  - Contact office through patient portal if symptoms worsen, new symptoms develop in arms or legs, walking  becomes progressively worse, or for any additional questions.         This note was generated with the assistance of ambient listening technology. Verbal consent was obtained by the patient and accompanying visitor(s) for the recording of patient appointment to facilitate this note. I attest to having reviewed and edited the generated note for accuracy, though some syntax or spelling errors may persist. Please contact the author of this note for any clarification.      Follow-Up:  Follow up in about 6 months (around 2/8/2026). If there are any questions prior to this, the patient was instructed to contact the office.       Cass Contreras [facility], PA-C  Neurosurgery  [facility]  08/08/2025

## 2025-08-26 ENCOUNTER — LAB VISIT (OUTPATIENT)
Dept: LAB | Facility: HOSPITAL | Age: 75
End: 2025-08-26
Attending: FAMILY MEDICINE
Payer: MEDICARE

## (undated) DEVICE — SKINMARKER W/RULER DEVON

## (undated) DEVICE — TUBING SUC UNIV W/CONN 12FT

## (undated) DEVICE — SUT 2-0 ETHILON 18 FS

## (undated) DEVICE — STAPLER SKIN ROTATING HEAD

## (undated) DEVICE — PACK ECLIPSE UNIVERSAL STERILE

## (undated) DEVICE — SUT CTD VICRYL 3-0 CR/SH

## (undated) DEVICE — DRAPE THREE-QTR REINF 53X77IN

## (undated) DEVICE — DRESSING AQUACEL FOAM 5 X 5

## (undated) DEVICE — GLOVE BIOGEL PI MICRO SZ 7.5

## (undated) DEVICE — SUT VICRYL 2 0 CT 2

## (undated) DEVICE — ALCOHOL 70% ISOP W/GREEN 16OZ

## (undated) DEVICE — SEE MEDLINE ITEM 156905

## (undated) DEVICE — SCREW SHANZ SS SD HA 4X150MM
Type: IMPLANTABLE DEVICE | Site: BACK | Status: NON-FUNCTIONAL
Removed: 2025-01-16

## (undated) DEVICE — GOWN POLY REINF BRTH SLV XL

## (undated) DEVICE — COVER BACK TBL HD 2-TIER 72IN

## (undated) DEVICE — DRAPE INVISISHIELD TOWEL SMALL

## (undated) DEVICE — Device

## (undated) DEVICE — DRAPE Z SURG STERI CLEAR UNIV

## (undated) DEVICE — ELECTRODE REM PLYHSV RETURN 9

## (undated) DEVICE — DRESSING AQUACEL SACRAL 8 X 7

## (undated) DEVICE — BURR MIS CURVED 3.0MM

## (undated) DEVICE — ADHESIVE MASTISOL VIAL 48/BX

## (undated) DEVICE — SPONGE COTTON TRAY 4X4IN

## (undated) DEVICE — SUT STRATAFIX PDS PLS 45CM

## (undated) DEVICE — TAPE SILK 3IN

## (undated) DEVICE — SPHERE MARKER REFLECTIVE DISP

## (undated) DEVICE — SUT VICRYL PLUS 0 CT1 18IN

## (undated) DEVICE — BLADE ELECTRO EDGE INSULATED

## (undated) DEVICE — DRAPE TOP 53X102IN

## (undated) DEVICE — COVER SNAP KAP 30

## (undated) DEVICE — DRESSING AQUACEL FOAM 4 X 12

## (undated) DEVICE — NDL HYPO STD REG BVL 22GX1.5IN

## (undated) DEVICE — GOWN POLY REINF BRTH SLV LG

## (undated) DEVICE — TOWEL OR NONABSORB ADH 17X26

## (undated) DEVICE — SUT MCRYL PLUS 4-0 PS2 27IN

## (undated) DEVICE — DRAPE STERI-DRAPE 1000 17X11IN

## (undated) DEVICE — PENCIL ROCKER SWITCH 10FT CORD

## (undated) DEVICE — DRAPE LAP T SHT W/ INSTR PAD

## (undated) DEVICE — GLOVE BIOGEL ECLIPSE SZ 7

## (undated) DEVICE — NDL SPINAL SPINOCAN 22GX3.5

## (undated) DEVICE — DRESSING LEUKOPLAST FLEX 1X3IN

## (undated) DEVICE — GELATIN SURGIPOWDER ABSORBABLE

## (undated) DEVICE — DRAPE C-ARM/MOBILE XRAY 44X80

## (undated) DEVICE — DRESSING SURGICAL 1/2X1/2

## (undated) DEVICE — DRESSING AQUACEL FOAM 4X4IN

## (undated) DEVICE — DRAPE STERILE Z BACK TABLE

## (undated) DEVICE — CORD BIPOLAR 12 FOOT

## (undated) DEVICE — APPLICATOR CHLORAPREP ORN 26ML

## (undated) DEVICE — GUIDEWIRE SPINAL BLUNT 1.45MM
Type: IMPLANTABLE DEVICE | Site: BACK | Status: NON-FUNCTIONAL
Removed: 2025-01-16

## (undated) DEVICE — BANDAGE ADHESIVE

## (undated) DEVICE — COUNT NDL FOAM MAGNET 40COUNT

## (undated) DEVICE — KIT SPINAL PATIENT CARE JACK

## (undated) DEVICE — DRESSING TEGADERM 4.4X5IN

## (undated) DEVICE — COVER OVERHEAD SURG LT BLUE

## (undated) DEVICE — DRAPE C-ARMOR EQUIPMENT COVER

## (undated) DEVICE — DRESSING ABSRBNT ISLAND 3.6X8